# Patient Record
Sex: FEMALE | Race: WHITE | NOT HISPANIC OR LATINO | Employment: OTHER | URBAN - METROPOLITAN AREA
[De-identification: names, ages, dates, MRNs, and addresses within clinical notes are randomized per-mention and may not be internally consistent; named-entity substitution may affect disease eponyms.]

---

## 2017-01-03 ENCOUNTER — OFFICE VISIT (OUTPATIENT)
Dept: PSYCHIATRY | Facility: CLINIC | Age: 37
End: 2017-01-03
Payer: COMMERCIAL

## 2017-01-03 VITALS
DIASTOLIC BLOOD PRESSURE: 72 MMHG | SYSTOLIC BLOOD PRESSURE: 108 MMHG | HEIGHT: 62 IN | BODY MASS INDEX: 23 KG/M2 | HEART RATE: 75 BPM | WEIGHT: 125 LBS

## 2017-01-03 DIAGNOSIS — F90.0 ADHD (ATTENTION DEFICIT HYPERACTIVITY DISORDER), INATTENTIVE TYPE: Primary | ICD-10-CM

## 2017-01-03 DIAGNOSIS — F41.9 ANXIETY DISORDER, UNSPECIFIED TYPE: ICD-10-CM

## 2017-01-03 PROCEDURE — 99999 PR PBB SHADOW E&M-EST. PATIENT-LVL II: CPT | Mod: PBBFAC,,, | Performed by: PSYCHIATRY & NEUROLOGY

## 2017-01-03 PROCEDURE — 99214 OFFICE O/P EST MOD 30 MIN: CPT | Mod: S$GLB,,, | Performed by: PSYCHIATRY & NEUROLOGY

## 2017-01-03 PROCEDURE — 1159F MED LIST DOCD IN RCRD: CPT | Mod: S$GLB,,, | Performed by: PSYCHIATRY & NEUROLOGY

## 2017-01-03 RX ORDER — DEXTROAMPHETAMINE SACCHARATE, AMPHETAMINE ASPARTATE, DEXTROAMPHETAMINE SULFATE AND AMPHETAMINE SULFATE 5; 5; 5; 5 MG/1; MG/1; MG/1; MG/1
TABLET ORAL
Qty: 30 TABLET | Refills: 0 | Status: SHIPPED | OUTPATIENT
Start: 2017-01-03 | End: 2017-03-08 | Stop reason: SDUPTHER

## 2017-01-03 RX ORDER — BUPROPION HYDROCHLORIDE 150 MG/1
150 TABLET ORAL DAILY
Qty: 7 TABLET | Refills: 0 | Status: SHIPPED | OUTPATIENT
Start: 2017-01-03 | End: 2017-03-08

## 2017-01-03 RX ORDER — FLUOXETINE HYDROCHLORIDE 20 MG/1
60 CAPSULE ORAL DAILY
Qty: 90 CAPSULE | Refills: 5 | Status: SHIPPED | OUTPATIENT
Start: 2017-01-03 | End: 2017-04-03 | Stop reason: SDUPTHER

## 2017-01-03 RX ORDER — DEXTROAMPHETAMINE SACCHARATE, AMPHETAMINE ASPARTATE, DEXTROAMPHETAMINE SULFATE AND AMPHETAMINE SULFATE 5; 5; 5; 5 MG/1; MG/1; MG/1; MG/1
TABLET ORAL
Qty: 30 TABLET | Refills: 0 | Status: SHIPPED | OUTPATIENT
Start: 2017-02-03 | End: 2017-03-08 | Stop reason: SDUPTHER

## 2017-01-03 RX ORDER — BUPROPION HYDROCHLORIDE 300 MG/1
300 TABLET ORAL DAILY
Qty: 30 TABLET | Refills: 5 | Status: SHIPPED | OUTPATIENT
Start: 2017-01-03 | End: 2017-03-08

## 2017-01-03 RX ORDER — DEXTROAMPHETAMINE SACCHARATE, AMPHETAMINE ASPARTATE, DEXTROAMPHETAMINE SULFATE AND AMPHETAMINE SULFATE 5; 5; 5; 5 MG/1; MG/1; MG/1; MG/1
TABLET ORAL
Qty: 30 TABLET | Refills: 0 | Status: SHIPPED | OUTPATIENT
Start: 2017-03-03 | End: 2017-03-08 | Stop reason: SDUPTHER

## 2017-01-03 NOTE — PROGRESS NOTES
Outpatient Psychiatry Follow-Up Visit (MD/NP)    1/3/2017    Clinical Status of Patient:  Outpatient (Ambulatory)    Chief Complaint:  Stormy Carlisle is a 36 y.o.  female who presents today for follow-up of inattention and anxiety.    Transition of care assumed from Dr Fallon. Patient arrived 20 min late for her appt. Pt has been stable on Adderall 10mg PO BID and prozac 60mg daily for over a year.  Pt with 2 young kids at home, states they are doing well, deals with stressors of taking care of them well although she admits to feeling normative feeling of being overwhelmed by small daily stressors.  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 Patient with anxiety related to social engagement, feeling overwhelmed by tasks, being judgemental of herself. Lots of pressure from family to be financially successful/status oriented. Parents in [place] and they visit often.     Pt speech rapid but linear, appears to exhibit hyperactive sx in context of interview. Pt has never tried wellbutrin, has never had a seizure, was started on prozac by a GP while in college. Had tried paxil but did not have good effect. Pt has done CBT in past which she thought was very helpful. Pt states she is not able to make it to therapy appts now with her kids at home.     ADHD score: 13/32      Review of Systems   Psychiatric Review Of Systems - Is patient experiencing or having changes in:  sleep: no  appetite: no  weight: no  energy/anergy: no  interest/pleasure/anhedonia: no  somatic symptoms: no  libido: no  anxiety/panic: no  guilty/hopelessness: no  concentration: no  S.I.B.s/risky behavior: no  Irritability: no  Racing thoughts: no  Impulsive behaviors: no  Paranoia:no  AVH:no    Past Medical, Family and Social History: The patient's past medical, family and social history have been reviewed and updated as appropriate  "within the electronic medical record - see encounter notes.    Compliance: yes    Side effects: denies    Risk Parameters:  Patient reports no suicidal ideation  Patient reports no homicidal ideation  Patient reports no self-injurious behavior  Patient reports no violent behavior      Exam (detailed: at least 9 elements; comprehensive: all 15 elements)     Constitutional  Vitals:    There were no vitals filed for this visit.   General:  unremarkable, age appropriate     Musculoskeletal  Muscle Strength/Tone:  not examined   Gait & Station:  non-ataxic     Psychiatric  Speech:  no latency; no press   Mood & Affect:  "good"   congruent and appropriate   Thought Process:  normal and logical   Associations:  intact   Thought Content:  normal, no suicidality, no homicidality, delusions, or paranoia   Insight:  has awareness of illness   Judgement: behavior is adequate to circumstances   Orientation:  grossly intact   Memory: intact for content of interview   Language: grossly intact   Attention Span & Concentration:  able to focus   Fund of Knowledge:  intact and appropriate to age and level of education       Assessment and Diagnosis     Status/Progress: Based on the examination today, the patient's problem(s) is/are well controlled.  New problems have not been presented today.   Comorbidities are not complicating management of the primary condition.  There are no active rule-out diagnoses for this patient at this time.    General Impression:   ADHD; JAMEY      Intervention/Counseling/Treatment Plan   · Medication Management: The risks and benefits of medication were discussed with the patient  · Start wellbutrin 150mg daily with plan to titrate up to 300mg daily to assist with anxiety and ADHD sx.   · Continue prozac 60 mg PO daily; refills given x 3 months  · Continue adderall 20 mg daily - patient takes 10 mg bid; refills given x 3 months  · Patient is not breast feeding, nor does she plan to get pregnant again  Side " "effects of antidepressant include but are not limited to GI upset, serotonin syndrome, suicidal thoughts, sexual dysfunction, induction of jonny, and birth defects. These were discussed and understood by the patient. Warned of serotonin syndrome with some migraine meds  Side effects of stimulants include but are not limited to insomnia, headache, anxiety, psychosis, anorexia, hypertension, cardiovascular effects. These were discussed and understood by the patient. In addition, recommended patient use some sort of birth control if she is sexually active as the medication can adversely affect a fetus. Patient expressed understanding of this information.  Patient denies any side effects from medication at this time.  Patient advised not to take afternoon dose after 4pm as it can interfere with her sleep if taken that late in the day. Pt was advised to take drug holiday.  Recommended "the happiness trap" for ACT    Discussed diagnosis, risks and benefits of proposed treatment vs alternative treatments vs no treatment, and potential side effects of these treatments. The patient expresses understanding of the above and displays the capacity to agree with this treatment given said understanding. Patient also agrees that, currently, the benefits outweigh the risks and would like to pursue/continue treatment at this time.      Return to Clinic: 3 months    "

## 2017-01-09 NOTE — PROGRESS NOTES
This encounter was reviewed by me and case was discussed with the resident physician. I agree with the assessment and  treatment plan as stated.

## 2017-02-01 NOTE — TELEPHONE ENCOUNTER
----- Message from Radha Rubio sent at 2/1/2017 10:32 AM CST -----  Contact: pt  x_  1st Request  _  2nd Request  _  3rd Request    Please refill the medication(s) listed below.    Medication #1Trinessa      Medication #2      Preferred Pharmacy:[facility] [id number]

## 2017-02-01 NOTE — TELEPHONE ENCOUNTER
Pt has scheduled annual exam, her request for refill on her birth control will be sent in to  for approval.

## 2017-02-01 NOTE — TELEPHONE ENCOUNTER
----- Message from Moriah Snell sent at 2/1/2017  2:43 PM CST -----  Contact: self  Patient is wanting a refill on her birth control Rx for Micronor. Patient was advised to schedule an annual appt and done so. Patient uses [facility] [address] and [address]. Patient can be reached at [phone].

## 2017-02-02 RX ORDER — ACETAMINOPHEN AND CODEINE PHOSPHATE 120; 12 MG/5ML; MG/5ML
1 SOLUTION ORAL DAILY
Qty: 30 TABLET | Refills: 6 | Status: SHIPPED | OUTPATIENT
Start: 2017-02-02 | End: 2021-08-19

## 2017-02-09 ENCOUNTER — PATIENT MESSAGE (OUTPATIENT)
Dept: PSYCHIATRY | Facility: CLINIC | Age: 37
End: 2017-02-09

## 2017-02-18 ENCOUNTER — PATIENT MESSAGE (OUTPATIENT)
Dept: PSYCHIATRY | Facility: CLINIC | Age: 37
End: 2017-02-18

## 2017-02-20 ENCOUNTER — PATIENT MESSAGE (OUTPATIENT)
Dept: PSYCHIATRY | Facility: CLINIC | Age: 37
End: 2017-02-20

## 2017-03-08 ENCOUNTER — OFFICE VISIT (OUTPATIENT)
Dept: OBSTETRICS AND GYNECOLOGY | Facility: CLINIC | Age: 37
End: 2017-03-08
Attending: OBSTETRICS & GYNECOLOGY
Payer: COMMERCIAL

## 2017-03-08 VITALS
HEIGHT: 62 IN | WEIGHT: 122.81 LBS | SYSTOLIC BLOOD PRESSURE: 100 MMHG | BODY MASS INDEX: 22.6 KG/M2 | DIASTOLIC BLOOD PRESSURE: 64 MMHG

## 2017-03-08 DIAGNOSIS — Z01.419 WELL WOMAN EXAM WITH ROUTINE GYNECOLOGICAL EXAM: Primary | ICD-10-CM

## 2017-03-08 DIAGNOSIS — Z30.09 GENERAL COUNSELING AND ADVICE FOR CONTRACEPTIVE MANAGEMENT: ICD-10-CM

## 2017-03-08 PROCEDURE — 88175 CYTOPATH C/V AUTO FLUID REDO: CPT

## 2017-03-08 PROCEDURE — 99395 PREV VISIT EST AGE 18-39: CPT | Mod: S$GLB,,, | Performed by: OBSTETRICS & GYNECOLOGY

## 2017-03-08 PROCEDURE — 99999 PR PBB SHADOW E&M-EST. PATIENT-LVL III: CPT | Mod: PBBFAC,,, | Performed by: OBSTETRICS & GYNECOLOGY

## 2017-03-08 NOTE — MR AVS SNAPSHOT
[facility] [address]  [address]  [facility] [address]  Phone: [phone]  Fax: [phone]                  Stormy Carlisle   3/8/2017 9:45 AM   Office Visit    Description:  Female : 1980   Provider:  Araseli Toledo DO   Department:  [facility] [address]           Reason for Visit     Annual Exam                To Do List           Goals (5 Years of Data)     None      [facility]     [facility] On [facility] Nurse Care Line -  Assistance  Registered nurses in the [facility] provide clinical advisement, health education, appointment booking, and other advisory services.  Call for this free service at [phone].             Medications           Message regarding Medications     Verify the changes and/or additions to your medication regime listed below are the same as discussed with your clinician today.  If any of these changes or additions are incorrect, please notify your healthcare provider.        STOP taking these medications     buPROPion (WELLBUTRIN XL) 150 MG TB24 tablet Take 1 tablet (150 mg total) by mouth once daily.    buPROPion (WELLBUTRIN XL) 300 MG 24 hr tablet Take 1 tablet (300 mg total) by mouth once daily.    oxycodone-acetaminophen (PERCOCET) 5-325 mg per tablet Take 1 tablet by mouth every 4 (four) hours as needed.           Verify that the below list of medications is an accurate representation of the medications you are currently taking.  If none reported, the list may be blank. If incorrect, please contact your healthcare provider. Carry this list with you in case of emergency.           Current Medications     dextroamphetamine-amphetamine (ADDERALL) 20 mg tablet Take 1/2 tablet PO BID    fluoxetine (PROZAC) 20 MG capsule Take 3 capsules (60 mg total) by mouth once daily.    TRINESSA, 28, 0.18/0.215/0.25 mg-35 mcg (28) tablet TK 1 T PO QD    clobetasol 0.05% (TEMOVATE) 0.05 % Oint Apply topically 2 (two) times daily.    iron,  "cbn & gluc-FA-B12-C-DSS 90-1-12-50 mg-mg-mcg-mg Tab Take 1 tablet by mouth 2 (two) times daily.    naproxen (NAPROSYN) 500 MG tablet Take 1 tablet (500 mg total) by mouth every 8 (eight) hours.    norethindrone (MICRONOR) 0.35 mg tablet Take 1 tablet (0.35 mg total) by mouth once daily.           Clinical Reference Information           Your Vitals Were     BP Height Weight Last Period BMI    100/64 5' 2" (1.575 m) 55.7 kg (122 lb 12.7 oz) 02/08/2017 (Approximate) 22.46 kg/m2      Blood Pressure          Most Recent Value    BP  100/64      Allergies as of 3/8/2017     Neoporacin [Neomycin-bacitracnzn-polymyxnb]      Immunizations Administered on Date of Encounter - 3/8/2017     None      Language Assistance Services     ATTENTION: Language assistance services are available, free of charge. Please call [phone].      ATENCIÓN: Si habla sanam, tiene a ramsey disposición servicios gratuitos de asistencia lingüística. Llame al [phone].     SUN Ý: N?u b?n nói Ti?ng Vi?t, có các d?ch v? h? tr? ngôn ng? mi?n phí dành cho b?n. G?i s? [phone].         [facility] [address] complies with applicable Federal civil rights laws and does not discriminate on the basis of race, color, national origin, age, disability, or sex.        "

## 2017-03-08 NOTE — PROGRESS NOTES
"CC: Well woman exam    Stormy Carlisle is a 37 y.o. female  presents for well woman exam.  LMP: Patient's last menstrual period was 2017 (approximate)..  No issues, problems, or complaints.  Due for pap, last recoreded her was normal in .  Interested in discussion of contraceptive options, mostly interested in OCPS.    Past Medical History:   Diagnosis Date    Abnormal Pap smear     Colposcopy    ADHD (attention deficit hyperactivity disorder)     Anxiety     Depression      of psychiatric care     Psychiatric problem     Therapy      Past Surgical History:   Procedure Laterality Date    Breast augmentation      INDUCED        Social History     Social History    Marital status:      Spouse name: N/A    Number of children: 2    Years of education: N/A     Occupational History    Not on file.     Social History Main Topics    Smoking status: Never Smoker    Smokeless tobacco: Not on file    Alcohol use No    Drug use: No    Sexual activity: Yes     Partners: Male     Birth control/ protection: OCP     Other Topics Concern    Not on file     Social History Narrative    Born and raised in [place].    Moved to [place] in .    Graduate school at [employer].     for 6 years.    2 children; recently post partum 1/15/15     is an     Works as an artist.     Family History   Problem Relation Age of Onset    Colon cancer Maternal Grandmother     Breast cancer Paternal Aunt     Ovarian cancer Neg Hx      OB History      Para Term  AB Living    4 2 2  2 2    SAB TAB Ectopic Multiple Live Births    0 1  0 2          /64   Ht 5' 2" (1.575 m)   Wt 55.7 kg (122 lb 12.7 oz)   LMP 2017 (Approximate)   BMI 22.46 kg/m²       ROS:  GENERAL: Denies weight gain or weight loss. Feeling well overall.   SKIN: Denies rash or lesions.   HEAD: Denies head injury or headache.   NODES: Denies enlarged lymph nodes.   CHEST: Denies chest pain " or shortness of breath.   CARDIOVASCULAR: Denies palpitations or left sided chest pain.   ABDOMEN: No abdominal pain, constipation, diarrhea, nausea, vomiting or rectal bleeding.   URINARY: No frequency, dysuria, hematuria, or burning on urination.  REPRODUCTIVE: See HPI.   BREASTS: The patient performs breast self-examination and denies pain, lumps, or nipple discharge.   HEMATOLOGIC: No easy bruisability or excessive bleeding.   MUSCULOSKELETAL: Denies joint pain or swelling.   NEUROLOGIC: Denies syncope or weakness.   PSYCHIATRIC: Denies depression, anxiety or mood swings.    PHYSICAL EXAM:  APPEARANCE: Well nourished, well developed, in no acute distress.  AFFECT: WNL, alert and oriented x 3  SKIN: No acne or hirsutism  NECK: Neck symmetric without masses or thyromegaly  NODES: No inguinal, cervical, axillary, or femoral lymph node enlargement  CHEST: Good respiratory effect  ABDOMEN: Soft.  No tenderness or masses.  No hepatosplenomegaly.  No hernias.  BREASTS: Symmetrical, no skin changes or visible lesions.  No palpable masses, nipple discharge bilaterally.  PELVIC: Normal external genitalia without lesions.  Normal hair distribution.  Adequate perineal body, normal urethral meatus.  Vagina moist and well rugated without lesions or discharge.  Cervix pink, without lesions, discharge or tenderness.  No significant cystocele or rectocele.  Bimanual exam shows uterus to be normal size, regular, mobile and nontender.  Adnexa without masses or tenderness.    EXTREMITIES: No edema.  The use of hormonal contraception has been fully discussed with the patient. We discussed all options including OCPs, transdermal patches, vaginal ring, Depo Provera injections, Implanon, and IUD. Warnings about anticipated minor side effects such as breakthrough spotting, nausea, breast tenderness, weight changes, acne, headaches, etc were given.  She has been told of the more serious potential side effects such as MI, stroke, and deep  vein thrombosis, all of which are very unlikely.  She has been asked to report any signs of such serious problems immediately. The need for additional protection, such as a condom, to prevent exposure to sexually transmitted diseases has also been discussed- the patient has been clearly reminded that no hormonal contraceptive method can protect her against diseases such as HIV and others. She understands and wishes to take the medication as prescribed. She wishes to begin nexplanon.      Well woman exam with routine gynecological exam  -     Liquid-based pap smear, screening    General counseling and advice for contraceptive management  -     TRINESSA, 28, 0.18/0.215/0.25 mg-35 mcg (28) tablet; Take 1 tablet by mouth once daily.  Dispense: 28 tablet; Refill: 11            Patient was counseled today on A.C.S. Pap guidelines and recommendations for yearly pelvic exams, mammograms and monthly self breast exams; to see her PCP for other health maintenance.     No Follow-up on file.

## 2017-03-10 ENCOUNTER — TELEPHONE (OUTPATIENT)
Dept: OBSTETRICS AND GYNECOLOGY | Facility: CLINIC | Age: 37
End: 2017-03-10

## 2017-03-10 NOTE — TELEPHONE ENCOUNTER
Left Vm informing pt that  Per Nexplanon insurance verification stated that her insurance policy had been terminated.

## 2017-04-03 ENCOUNTER — OFFICE VISIT (OUTPATIENT)
Dept: PSYCHIATRY | Facility: CLINIC | Age: 37
End: 2017-04-03
Payer: COMMERCIAL

## 2017-04-03 VITALS
HEIGHT: 62 IN | HEART RATE: 73 BPM | SYSTOLIC BLOOD PRESSURE: 109 MMHG | WEIGHT: 124 LBS | DIASTOLIC BLOOD PRESSURE: 68 MMHG | BODY MASS INDEX: 22.82 KG/M2

## 2017-04-03 DIAGNOSIS — F90.8 ADHD, ADULT RESIDUAL TYPE: Primary | ICD-10-CM

## 2017-04-03 PROCEDURE — 99214 OFFICE O/P EST MOD 30 MIN: CPT | Mod: S$GLB,,, | Performed by: PSYCHIATRY & NEUROLOGY

## 2017-04-03 PROCEDURE — 99999 PR PBB SHADOW E&M-EST. PATIENT-LVL II: CPT | Mod: PBBFAC,,, | Performed by: PSYCHIATRY & NEUROLOGY

## 2017-04-03 RX ORDER — FLUOXETINE HYDROCHLORIDE 20 MG/1
60 CAPSULE ORAL DAILY
Qty: 90 CAPSULE | Refills: 5 | Status: SHIPPED | OUTPATIENT
Start: 2017-04-03 | End: 2017-07-07 | Stop reason: SDUPTHER

## 2017-04-03 RX ORDER — DEXTROAMPHETAMINE SACCHARATE, AMPHETAMINE ASPARTATE, DEXTROAMPHETAMINE SULFATE AND AMPHETAMINE SULFATE 5; 5; 5; 5 MG/1; MG/1; MG/1; MG/1
TABLET ORAL
Qty: 30 TABLET | Refills: 0 | Status: SHIPPED | OUTPATIENT
Start: 2017-04-03 | End: 2017-07-07 | Stop reason: SDUPTHER

## 2017-04-03 RX ORDER — DEXTROAMPHETAMINE SACCHARATE, AMPHETAMINE ASPARTATE, DEXTROAMPHETAMINE SULFATE AND AMPHETAMINE SULFATE 5; 5; 5; 5 MG/1; MG/1; MG/1; MG/1
1 TABLET ORAL DAILY
Qty: 30 TABLET | Refills: 0 | Status: SHIPPED | OUTPATIENT
Start: 2017-05-03 | End: 2017-07-07 | Stop reason: SDUPTHER

## 2017-04-03 RX ORDER — DEXTROAMPHETAMINE SACCHARATE, AMPHETAMINE ASPARTATE, DEXTROAMPHETAMINE SULFATE AND AMPHETAMINE SULFATE 5; 5; 5; 5 MG/1; MG/1; MG/1; MG/1
1 TABLET ORAL DAILY
Qty: 30 TABLET | Refills: 0 | Status: SHIPPED | OUTPATIENT
Start: 2017-06-03 | End: 2017-07-07 | Stop reason: SDUPTHER

## 2017-04-03 NOTE — PROGRESS NOTES
Outpatient Psychiatry Follow-Up Visit (MD/NP)    4/3/2017    Clinical Status of Patient:  Outpatient (Ambulatory)    Chief Complaint:  Stormy Carlisle is a 37 y.o.  female who presents today for follow-up of inattention and anxiety. Patient arrived 10 min late for her appt. Pt has been stable on Adderall 10mg PO BID and prozac 60mg daily for over a year.  Tried wellbutrin but had side effects, has stopped taking it since last visit. Pt with 2 young kids at home, states they are doing well, deals with stressors of taking care of them well although she admits to feeling normative feeling of being overwhelmed by small daily stressors.  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 Patient with anxiety related to social engagement, feeling overwhelmed by tasks, being judgemental of herself. Pt having some relationship stressors with  but relationship stable. Lots of pressure from family to be financially successful/status oriented. Parents in [place] and they visit often.     Pt speech rapid but linear, appears to exhibit hyperactive sx in context of interview. Pt was started on prozac by a GP while in college. Had tried paxil but did not have good effect. Pt has done CBT in past which she thought was very helpful. Pt states she is not able to make it to therapy appts now with her kids at home.     ADHD score: 13/32      Review of Systems   Psychiatric Review Of Systems - Is patient experiencing or having changes in:  sleep: no  appetite: no  weight: no  energy/anergy: no  interest/pleasure/anhedonia: no  somatic symptoms: no  libido: no  anxiety/panic: no  guilty/hopelessness: no  concentration: no  S.I.B.s/risky behavior: no  Irritability: no  Racing thoughts: no  Impulsive behaviors: no  Paranoia:no  AVH:no    Past Medical, Family and Social History: The patient's past medical, family and social  "history have been reviewed and updated as appropriate within the electronic medical record - see encounter notes.    Compliance: yes    Side effects: denies    Risk Parameters:  Patient reports no suicidal ideation  Patient reports no homicidal ideation  Patient reports no self-injurious behavior  Patient reports no violent behavior      Exam (detailed: at least 9 elements; comprehensive: all 15 elements)     Constitutional  Vitals:    Vitals:    04/03/17 0908   BP: 109/68   Pulse: 73      General:  unremarkable, age appropriate     Musculoskeletal  Muscle Strength/Tone:  not examined   Gait & Station:  non-ataxic     Psychiatric  Speech:  no latency; no press   Mood & Affect:  "good"   congruent and appropriate   Thought Process:  normal and logical   Associations:  intact   Thought Content:  normal, no suicidality, no homicidality, delusions, or paranoia   Insight:  has awareness of illness   Judgement: behavior is adequate to circumstances   Orientation:  grossly intact   Memory: intact for content of interview   Language: grossly intact   Attention Span & Concentration:  able to focus   Fund of Knowledge:  intact and appropriate to age and level of education       Assessment and Diagnosis     Status/Progress: Based on the examination today, the patient's problem(s) is/are well controlled.  New problems have not been presented today.   Comorbidities are not complicating management of the primary condition.  There are no active rule-out diagnoses for this patient at this time.    General Impression:   ADHD; JAMEY      Intervention/Counseling/Treatment Plan   · Medication Management: The risks and benefits of medication were discussed with the patient  · Stop wellbutrin 2/2 anxiety.   · Continue prozac 60 mg PO daily; refills given x 3 months  · Continue adderall 20 mg daily - patient takes 10 mg bid; refills given x 3 months  · Patient is not breast feeding, nor does she plan to get pregnant again  Side effects of " antidepressant include but are not limited to GI upset, serotonin syndrome, suicidal thoughts, sexual dysfunction, induction of jonny, and birth defects. These were discussed and understood by the patient. Warned of serotonin syndrome with some migraine meds  Side effects of stimulants include but are not limited to insomnia, headache, anxiety, psychosis, anorexia, hypertension, cardiovascular effects. These were discussed and understood by the patient. In addition, recommended patient use some sort of birth control if she is sexually active as the medication can adversely affect a fetus. Patient expressed understanding of this information.  Patient denies any side effects from medication at this time.  Patient advised not to take afternoon dose after 4pm as it can interfere with her sleep if taken that late in the day. Pt was advised to take drug holiday.      Discussed diagnosis, risks and benefits of proposed treatment vs alternative treatments vs no treatment, and potential side effects of these treatments. The patient expresses understanding of the above and displays the capacity to agree with this treatment given said understanding. Patient also agrees that, currently, the benefits outweigh the risks and would like to pursue/continue treatment at this time.      Return to Clinic: 3 months

## 2017-07-07 ENCOUNTER — OFFICE VISIT (OUTPATIENT)
Dept: PSYCHIATRY | Facility: CLINIC | Age: 37
End: 2017-07-07
Payer: COMMERCIAL

## 2017-07-07 VITALS
HEIGHT: 62 IN | HEART RATE: 77 BPM | SYSTOLIC BLOOD PRESSURE: 107 MMHG | BODY MASS INDEX: 23 KG/M2 | DIASTOLIC BLOOD PRESSURE: 62 MMHG | WEIGHT: 125 LBS

## 2017-07-07 DIAGNOSIS — F90.0 ADHD, PREDOMINANTLY INATTENTIVE TYPE: ICD-10-CM

## 2017-07-07 DIAGNOSIS — F32.A DEPRESSION, UNSPECIFIED DEPRESSION TYPE: Primary | ICD-10-CM

## 2017-07-07 PROCEDURE — 99999 PR PBB SHADOW E&M-EST. PATIENT-LVL III: CPT | Mod: PBBFAC,,, | Performed by: PSYCHIATRY & NEUROLOGY

## 2017-07-07 PROCEDURE — 99213 OFFICE O/P EST LOW 20 MIN: CPT | Mod: S$GLB,,, | Performed by: PSYCHIATRY & NEUROLOGY

## 2017-07-07 RX ORDER — DEXTROAMPHETAMINE SACCHARATE, AMPHETAMINE ASPARTATE, DEXTROAMPHETAMINE SULFATE AND AMPHETAMINE SULFATE 5; 5; 5; 5 MG/1; MG/1; MG/1; MG/1
TABLET ORAL
Qty: 30 TABLET | Refills: 0 | Status: SHIPPED | OUTPATIENT
Start: 2017-08-06 | End: 2017-07-07 | Stop reason: SDUPTHER

## 2017-07-07 RX ORDER — DEXTROAMPHETAMINE SACCHARATE, AMPHETAMINE ASPARTATE, DEXTROAMPHETAMINE SULFATE AND AMPHETAMINE SULFATE 5; 5; 5; 5 MG/1; MG/1; MG/1; MG/1
TABLET ORAL
Qty: 30 TABLET | Refills: 0 | Status: SHIPPED | OUTPATIENT
Start: 2017-07-07 | End: 2017-09-15

## 2017-07-07 RX ORDER — DEXTROAMPHETAMINE SACCHARATE, AMPHETAMINE ASPARTATE, DEXTROAMPHETAMINE SULFATE AND AMPHETAMINE SULFATE 5; 5; 5; 5 MG/1; MG/1; MG/1; MG/1
TABLET ORAL
Qty: 30 TABLET | Refills: 0 | Status: SHIPPED | OUTPATIENT
Start: 2017-08-06 | End: 2017-09-15 | Stop reason: SDUPTHER

## 2017-07-07 RX ORDER — FLUOXETINE HYDROCHLORIDE 20 MG/1
60 CAPSULE ORAL DAILY
Qty: 90 CAPSULE | Refills: 5 | Status: SHIPPED | OUTPATIENT
Start: 2017-07-07 | End: 2017-09-15 | Stop reason: SDUPTHER

## 2017-07-07 RX ORDER — DEXTROAMPHETAMINE SACCHARATE, AMPHETAMINE ASPARTATE, DEXTROAMPHETAMINE SULFATE AND AMPHETAMINE SULFATE 5; 5; 5; 5 MG/1; MG/1; MG/1; MG/1
TABLET ORAL
Qty: 30 TABLET | Refills: 0 | Status: SHIPPED | OUTPATIENT
Start: 2017-09-05 | End: 2017-09-15

## 2017-07-07 RX ORDER — DEXTROAMPHETAMINE SACCHARATE, AMPHETAMINE ASPARTATE, DEXTROAMPHETAMINE SULFATE AND AMPHETAMINE SULFATE 5; 5; 5; 5 MG/1; MG/1; MG/1; MG/1
TABLET ORAL
Qty: 30 TABLET | Refills: 0 | Status: SHIPPED | OUTPATIENT
Start: 2017-07-07 | End: 2017-07-07 | Stop reason: SDUPTHER

## 2017-07-07 RX ORDER — DEXTROAMPHETAMINE SACCHARATE, AMPHETAMINE ASPARTATE, DEXTROAMPHETAMINE SULFATE AND AMPHETAMINE SULFATE 5; 5; 5; 5 MG/1; MG/1; MG/1; MG/1
TABLET ORAL
Qty: 30 TABLET | Refills: 0 | Status: SHIPPED | OUTPATIENT
Start: 2017-09-05 | End: 2017-07-07 | Stop reason: SDUPTHER

## 2017-07-07 NOTE — PATIENT INSTRUCTIONS
Take medications as directed. Do not stop medications before our next appointment unless instructed to do so. If there are any side effects or other problems causing you to be unable to take the medications please call Dr. Jurado at [phone] and leave him a message.
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
## 2017-09-15 ENCOUNTER — OFFICE VISIT (OUTPATIENT)
Dept: PSYCHIATRY | Facility: CLINIC | Age: 37
End: 2017-09-15
Payer: COMMERCIAL

## 2017-09-15 VITALS
SYSTOLIC BLOOD PRESSURE: 111 MMHG | HEART RATE: 76 BPM | BODY MASS INDEX: 22.63 KG/M2 | DIASTOLIC BLOOD PRESSURE: 76 MMHG | WEIGHT: 123 LBS | HEIGHT: 62 IN

## 2017-09-15 DIAGNOSIS — F32.A DEPRESSION, UNSPECIFIED DEPRESSION TYPE: ICD-10-CM

## 2017-09-15 DIAGNOSIS — F90.0 ADHD, PREDOMINANTLY INATTENTIVE TYPE: Primary | ICD-10-CM

## 2017-09-15 DIAGNOSIS — F41.9 ANXIETY DISORDER, UNSPECIFIED TYPE: ICD-10-CM

## 2017-09-15 PROCEDURE — 99213 OFFICE O/P EST LOW 20 MIN: CPT | Mod: S$GLB,,, | Performed by: PSYCHIATRY & NEUROLOGY

## 2017-09-15 PROCEDURE — 3008F BODY MASS INDEX DOCD: CPT | Mod: S$GLB,,, | Performed by: PSYCHIATRY & NEUROLOGY

## 2017-09-15 PROCEDURE — 99999 PR PBB SHADOW E&M-EST. PATIENT-LVL III: CPT | Mod: PBBFAC,,, | Performed by: PSYCHIATRY & NEUROLOGY

## 2017-09-15 RX ORDER — DEXTROAMPHETAMINE SACCHARATE, AMPHETAMINE ASPARTATE, DEXTROAMPHETAMINE SULFATE AND AMPHETAMINE SULFATE 5; 5; 5; 5 MG/1; MG/1; MG/1; MG/1
TABLET ORAL
Qty: 30 TABLET | Refills: 0 | Status: SHIPPED | OUTPATIENT
Start: 2017-10-15 | End: 2017-09-15 | Stop reason: SDUPTHER

## 2017-09-15 RX ORDER — DEXTROAMPHETAMINE SACCHARATE, AMPHETAMINE ASPARTATE, DEXTROAMPHETAMINE SULFATE AND AMPHETAMINE SULFATE 5; 5; 5; 5 MG/1; MG/1; MG/1; MG/1
TABLET ORAL
Qty: 30 TABLET | Refills: 0 | Status: SHIPPED | OUTPATIENT
Start: 2017-09-15 | End: 2017-09-15 | Stop reason: SDUPTHER

## 2017-09-15 RX ORDER — DEXTROAMPHETAMINE SACCHARATE, AMPHETAMINE ASPARTATE, DEXTROAMPHETAMINE SULFATE AND AMPHETAMINE SULFATE 5; 5; 5; 5 MG/1; MG/1; MG/1; MG/1
TABLET ORAL
Qty: 30 TABLET | Refills: 0 | Status: SHIPPED | OUTPATIENT
Start: 2017-11-14 | End: 2017-11-28 | Stop reason: SDUPTHER

## 2017-09-15 RX ORDER — FLUOXETINE HYDROCHLORIDE 20 MG/1
60 CAPSULE ORAL DAILY
Qty: 90 CAPSULE | Refills: 5 | Status: SHIPPED | OUTPATIENT
Start: 2017-09-15 | End: 2018-01-05 | Stop reason: SDUPTHER

## 2017-09-15 NOTE — PROGRESS NOTES
Outpatient Psychiatry Follow-Up Visit (MD/NP)    9/15/2017    Clinical Status of Patient:  Outpatient (Ambulatory)    Chief Complaint:  Stormy Carlisle is a 37 y.o. female who presents today for follow-up of depression, anxiety and attention problems.  Met with patient.      Interval History and Content of Current Session:  Interim Events/Subjective Report/Content of Current Session: 36 y/o F with hx depression, anxiety and ADHD who presents for follow up. Patient is 20 minutes late for 30 minute appointment. Patient seen briefly. Pt reports that things are going well. Relationship with  has improved since last visit. Children are back in school which has decreased the patient's anxiety. No ssx of depression at this time. Continued anxiety though improved. Good appetite, sleeping well.     Review of Systems   · PSYCHIATRIC: Pertinant items are noted in the narrative.  · CONSTITUTIONAL: No weight gain or loss.   · MUSCULOSKELETAL: No pain or stiffness of the joints.  · NEUROLOGIC: No weakness, sensory changes, seizures, confusion, memory loss, tremor or other abnormal movements.  · ENDOCRINE: No polydipsia or polyuria.  · INTEGUMENTARY: No rashes or lacerations.  · EYES: No exophthalmos, jaundice or blindness.  · ENT: No dizziness, tinnitus or hearing loss.  · RESPIRATORY: No shortness of breath.  · CARDIOVASCULAR: No tachycardia or chest pain.  · GASTROINTESTINAL: No nausea, vomiting, pain, constipation or diarrhea.  · GENITOURINARY: No frequency, dysuria or sexual dysfunction.    Past Medical, Family and Social History: The patient's past medical, family and social history have been reviewed and updated as appropriate within the electronic medical record - see encounter notes.    Compliance: yes    Side effects: None    Risk Parameters:  Patient reports no suicidal ideation  Patient reports no homicidal ideation  Patient reports no self-injurious behavior  Patient reports no violent behavior    Exam (detailed:  "at least 9 elements; comprehensive: all 15 elements)   Constitutional  Vitals:  Most recent vital signs, dated less than 90 days prior to this appointment, were reviewed.   Vitals:    09/15/17 0917   BP: 111/76   Pulse: 76   Weight: 55.8 kg (123 lb)   Height: 5' 2" (1.575 m)        General:  unremarkable, age appropriate, normal weight, well nourished, casually dressed     Musculoskeletal  Muscle Strength/Tone:  no dystonia, no tremor   Gait & Station:  non-ataxic     Psychiatric  Speech:  no latency; no press, spontaneous   Mood & Affect:  "good"  congruent and appropriate, full   Thought Process:  normal and logical, logical   Associations:  intact   Thought Content:  normal, no suicidality, no homicidality, delusions, or paranoia   Insight:  intact, has awareness of illness   Judgement: behavior is adequate to circumstances, age appropriate   Orientation:  grossly intact   Memory: intact for content of interview, able to remember recent events- yes, able to remember remote events- yes   Language: grossly intact   Attention Span & Concentration:  able to focus   Fund of Knowledge:  intact and appropriate to age and level of education     Assessment and Diagnosis   Status/Progress: Based on the examination today, the patient's problem(s) is/are improved.  New problems have not been presented today.   Co-morbidities, Diagnostic uncertainty and Lack of compliance are not complicating management of the primary condition.  There are no active rule-out diagnoses for this patient at this time.     General Impression: 38 y/o F with hx of depression, anxiety and ADHD who presents for follow up. Ssx of depression and anxiety are improved since last appointment. Continue current regimen.       ICD-10-CM ICD-9-CM   1. ADHD, predominantly inattentive type F90.0 314.00   2. Anxiety disorder, unspecified type F41.9 300.00   3. Depression, unspecified depression type F32.9 311     Intervention/Counseling/Treatment Plan "     Depression, anxiety  · Continue Prozac 60 mg PO daily    ADHD, inattentive type  · Continue Adderall 10 mg PO BID    Discussed diagnosis, risks and benefits of proposed treatment vs alternative treatments vs no treatment, and potential side effects of these treatments. The patient expresses understanding of the above and displays the capacity to agree with this treatment given said understanding. Patient also agrees that, currently, the benefits outweigh the risks and would like to pursue/continue treatment at this time.    Return to Clinic: 3 months       Ravinder Jurado MD  [facility]  [phone]

## 2017-11-28 DIAGNOSIS — Z30.09 GENERAL COUNSELING AND ADVICE FOR CONTRACEPTIVE MANAGEMENT: ICD-10-CM

## 2017-11-28 RX ORDER — DEXTROAMPHETAMINE SACCHARATE, AMPHETAMINE ASPARTATE, DEXTROAMPHETAMINE SULFATE AND AMPHETAMINE SULFATE 5; 5; 5; 5 MG/1; MG/1; MG/1; MG/1
TABLET ORAL
Qty: 30 TABLET | Refills: 0 | Status: SHIPPED | OUTPATIENT
Start: 2017-11-28 | End: 2017-12-01 | Stop reason: SDUPTHER

## 2017-11-29 NOTE — TELEPHONE ENCOUNTER
----- Message from Nicho Gaona sent at 11/29/2017  8:56 AM CST -----  Contact: pt  x_ 1st Request  _ 2nd Request  _ 3rd Request    Who: pt    Why: requesting a refill on her birth control RX    What Number to Call Back: [phone]    When to Expect a call back: (Before the end of the day)  -- if call after 3:00 call back will be tomorrow.

## 2017-12-01 DIAGNOSIS — F90.0 ADHD, PREDOMINANTLY INATTENTIVE TYPE: Primary | ICD-10-CM

## 2017-12-01 RX ORDER — DEXTROAMPHETAMINE SACCHARATE, AMPHETAMINE ASPARTATE, DEXTROAMPHETAMINE SULFATE AND AMPHETAMINE SULFATE 5; 5; 5; 5 MG/1; MG/1; MG/1; MG/1
TABLET ORAL
Qty: 30 TABLET | Refills: 0 | Status: SHIPPED | OUTPATIENT
Start: 2017-12-01 | End: 2018-01-05 | Stop reason: SDUPTHER

## 2017-12-13 DIAGNOSIS — Z30.09 GENERAL COUNSELING AND ADVICE FOR CONTRACEPTIVE MANAGEMENT: ICD-10-CM

## 2018-01-05 ENCOUNTER — OFFICE VISIT (OUTPATIENT)
Dept: PSYCHIATRY | Facility: CLINIC | Age: 38
End: 2018-01-05
Payer: COMMERCIAL

## 2018-01-05 VITALS
SYSTOLIC BLOOD PRESSURE: 111 MMHG | HEART RATE: 85 BPM | WEIGHT: 130.38 LBS | HEIGHT: 62 IN | DIASTOLIC BLOOD PRESSURE: 67 MMHG | BODY MASS INDEX: 23.99 KG/M2

## 2018-01-05 DIAGNOSIS — F32.A DEPRESSION, UNSPECIFIED DEPRESSION TYPE: ICD-10-CM

## 2018-01-05 DIAGNOSIS — F41.9 ANXIETY: ICD-10-CM

## 2018-01-05 DIAGNOSIS — F90.0 ATTENTION DEFICIT HYPERACTIVITY DISORDER (ADHD), PREDOMINANTLY INATTENTIVE TYPE: Primary | ICD-10-CM

## 2018-01-05 PROCEDURE — 99999 PR PBB SHADOW E&M-EST. PATIENT-LVL II: CPT | Mod: PBBFAC,,, | Performed by: PSYCHIATRY & NEUROLOGY

## 2018-01-05 PROCEDURE — 99214 OFFICE O/P EST MOD 30 MIN: CPT | Mod: S$GLB,,, | Performed by: PSYCHIATRY & NEUROLOGY

## 2018-01-05 RX ORDER — DEXTROAMPHETAMINE SACCHARATE, AMPHETAMINE ASPARTATE, DEXTROAMPHETAMINE SULFATE AND AMPHETAMINE SULFATE 5; 5; 5; 5 MG/1; MG/1; MG/1; MG/1
TABLET ORAL
Qty: 30 TABLET | Refills: 0 | Status: SHIPPED | OUTPATIENT
Start: 2018-01-05 | End: 2018-01-05 | Stop reason: SDUPTHER

## 2018-01-05 RX ORDER — FLUOXETINE HYDROCHLORIDE 20 MG/1
60 CAPSULE ORAL DAILY
Qty: 90 CAPSULE | Refills: 5 | Status: SHIPPED | OUTPATIENT
Start: 2018-01-05 | End: 2018-04-20 | Stop reason: SDUPTHER

## 2018-01-05 RX ORDER — DEXTROAMPHETAMINE SACCHARATE, AMPHETAMINE ASPARTATE, DEXTROAMPHETAMINE SULFATE AND AMPHETAMINE SULFATE 5; 5; 5; 5 MG/1; MG/1; MG/1; MG/1
TABLET ORAL
Qty: 30 TABLET | Refills: 0 | Status: SHIPPED | OUTPATIENT
Start: 2018-03-06 | End: 2018-04-20 | Stop reason: SDUPTHER

## 2018-01-05 RX ORDER — DEXTROAMPHETAMINE SACCHARATE, AMPHETAMINE ASPARTATE, DEXTROAMPHETAMINE SULFATE AND AMPHETAMINE SULFATE 5; 5; 5; 5 MG/1; MG/1; MG/1; MG/1
TABLET ORAL
Qty: 30 TABLET | Refills: 0 | Status: SHIPPED | OUTPATIENT
Start: 2018-02-04 | End: 2018-01-05 | Stop reason: SDUPTHER

## 2018-01-05 NOTE — PROGRESS NOTES
"Outpatient Psychiatry Follow-Up Visit (MD/NP)    1/5/2018    Clinical Status of Patient:  Outpatient (Ambulatory)    Chief Complaint:  Stormy Carlisle is a 37 y.o. female who presents today for follow-up of depression, anxiety and attention problems.  Met with patient.      Interval History and Content of Current Session:  Interim Events/Subjective Report/Content of Current Session: 38 y/o F with hx depression, anxiety and ADHD who presents for follow up. She reports that the holiday season has been "crazy" due to work and children being off of school. She recounts story of having to bring daughter to work and daughter interrupting her while talking to a client. In addition, her parents are coming to visit which is very stressful for her. Otherwise, patient is feeling "okay." No ssx of depression at this time. Continued anxiety though improved. Good appetite, sleeping well.     Psychiatric Medications:  · Prozac 60 mg PO daily  · Adderall 10 mg PO BID    Review of Systems   · PSYCHIATRIC: Pertinant items are noted in the narrative.  · CONSTITUTIONAL: No weight gain or loss.   · MUSCULOSKELETAL: No pain or stiffness of the joints.  · NEUROLOGIC: No weakness, sensory changes, seizures, confusion, memory loss, tremor or other abnormal movements.  · ENDOCRINE: No polydipsia or polyuria.  · INTEGUMENTARY: No rashes or lacerations.  · EYES: No exophthalmos, jaundice or blindness.  · ENT: No dizziness, tinnitus or hearing loss.  · RESPIRATORY: No shortness of breath.  · CARDIOVASCULAR: No tachycardia or chest pain.  · GASTROINTESTINAL: No nausea, vomiting, pain, constipation or diarrhea.  · GENITOURINARY: No frequency, dysuria or sexual dysfunction.    Past Medical, Family and Social History: The patient's past medical, family and social history have been reviewed and updated as appropriate within the electronic medical record - see encounter notes.    Compliance: yes    Side effects: None    Risk Parameters:  Patient " "reports no suicidal ideation  Patient reports no homicidal ideation  Patient reports no self-injurious behavior  Patient reports no violent behavior    Exam (detailed: at least 9 elements; comprehensive: all 15 elements)   Constitutional  Vitals:  Most recent vital signs, dated less than 90 days prior to this appointment, were reviewed.   Vitals:    01/05/18 1515   BP: 111/67   Pulse: 85   Weight: 59.1 kg (130 lb 6.4 oz)   Height: 5' 2" (1.575 m)      General:  unremarkable, age appropriate, normal weight, well nourished, casually dressed     Musculoskeletal  Muscle Strength/Tone:  no dystonia, no tremor   Gait & Station:  non-ataxic     Psychiatric  Speech:  no latency; no press, spontaneous   Mood & Affect:  "okay"  congruent and appropriate, full   Thought Process:  normal and logical, logical   Associations:  intact   Thought Content:  normal, no suicidality, no homicidality, delusions, or paranoia   Insight:  intact, has awareness of illness   Judgement: behavior is adequate to circumstances, age appropriate   Orientation:  grossly intact   Memory: intact for content of interview, able to remember recent events- yes, able to remember remote events- yes   Language: grossly intact   Attention Span & Concentration:  able to focus   Fund of Knowledge:  intact and appropriate to age and level of education     Assessment and Diagnosis   Status/Progress: Based on the examination today, the patient's problem(s) is/are improved.  New problems have not been presented today.   Co-morbidities, Diagnostic uncertainty and Lack of compliance are not complicating management of the primary condition.  There are no active rule-out diagnoses for this patient at this time.     General Impression: 36 y/o F with hx of depression, anxiety and ADHD who presents for follow up. Pt with continued anxiety though it is tolerable for her. No ssx of depression. Continue current regimen.       ICD-10-CM ICD-9-CM   1. Attention deficit " hyperactivity disorder (ADHD), predominantly inattentive type F90.0 314.00   2. Anxiety F41.9 300.00   3. Depression, unspecified depression type F32.9 311     Intervention/Counseling/Treatment Plan     Depression & Anxiety  · Continue Prozac 60 mg PO daily    ADHD, inattentive type  · Continue Adderall 10 mg PO BID    Discussed diagnosis, risks and benefits of proposed treatment vs alternative treatments vs no treatment, and potential side effects of these treatments. The patient expresses understanding of the above and displays the capacity to agree with this treatment given said understanding. Patient also agrees that, currently, the benefits outweigh the risks and would like to pursue/continue treatment at this time. No plans to get pregnant at this time.     Return to Clinic: 3 months       Ravinder Jurado MD  [facility]

## 2018-02-01 DIAGNOSIS — Z30.09 GENERAL COUNSELING AND ADVICE FOR CONTRACEPTIVE MANAGEMENT: ICD-10-CM

## 2018-03-26 DIAGNOSIS — Z30.09 GENERAL COUNSELING AND ADVICE FOR CONTRACEPTIVE MANAGEMENT: ICD-10-CM

## 2018-04-20 ENCOUNTER — OFFICE VISIT (OUTPATIENT)
Dept: PSYCHIATRY | Facility: CLINIC | Age: 38
End: 2018-04-20
Payer: COMMERCIAL

## 2018-04-20 VITALS
HEART RATE: 94 BPM | BODY MASS INDEX: 23.65 KG/M2 | SYSTOLIC BLOOD PRESSURE: 124 MMHG | DIASTOLIC BLOOD PRESSURE: 68 MMHG | HEIGHT: 62 IN | WEIGHT: 128.5 LBS

## 2018-04-20 DIAGNOSIS — F41.9 ANXIETY: ICD-10-CM

## 2018-04-20 DIAGNOSIS — F32.A DEPRESSION, UNSPECIFIED DEPRESSION TYPE: ICD-10-CM

## 2018-04-20 DIAGNOSIS — F90.0 ATTENTION DEFICIT HYPERACTIVITY DISORDER (ADHD), PREDOMINANTLY INATTENTIVE TYPE: Primary | ICD-10-CM

## 2018-04-20 PROCEDURE — 99214 OFFICE O/P EST MOD 30 MIN: CPT | Mod: S$GLB,,, | Performed by: PSYCHIATRY & NEUROLOGY

## 2018-04-20 PROCEDURE — 99999 PR PBB SHADOW E&M-EST. PATIENT-LVL II: CPT | Mod: PBBFAC,,, | Performed by: PSYCHIATRY & NEUROLOGY

## 2018-04-20 RX ORDER — FLUOXETINE HYDROCHLORIDE 20 MG/1
60 CAPSULE ORAL DAILY
Qty: 90 CAPSULE | Refills: 5 | Status: SHIPPED | OUTPATIENT
Start: 2018-04-20 | End: 2018-10-03

## 2018-04-20 RX ORDER — DEXTROAMPHETAMINE SACCHARATE, AMPHETAMINE ASPARTATE, DEXTROAMPHETAMINE SULFATE AND AMPHETAMINE SULFATE 5; 5; 5; 5 MG/1; MG/1; MG/1; MG/1
TABLET ORAL
Qty: 30 TABLET | Refills: 0 | Status: SHIPPED | OUTPATIENT
Start: 2018-05-20 | End: 2018-04-20 | Stop reason: SDUPTHER

## 2018-04-20 RX ORDER — DEXTROAMPHETAMINE SACCHARATE, AMPHETAMINE ASPARTATE, DEXTROAMPHETAMINE SULFATE AND AMPHETAMINE SULFATE 5; 5; 5; 5 MG/1; MG/1; MG/1; MG/1
TABLET ORAL
Qty: 30 TABLET | Refills: 0 | Status: SHIPPED | OUTPATIENT
Start: 2018-04-20 | End: 2018-04-20 | Stop reason: SDUPTHER

## 2018-04-20 RX ORDER — DEXTROAMPHETAMINE SACCHARATE, AMPHETAMINE ASPARTATE, DEXTROAMPHETAMINE SULFATE AND AMPHETAMINE SULFATE 5; 5; 5; 5 MG/1; MG/1; MG/1; MG/1
TABLET ORAL
Qty: 30 TABLET | Refills: 0 | Status: SHIPPED | OUTPATIENT
Start: 2018-06-19 | End: 2018-07-25 | Stop reason: SDUPTHER

## 2018-04-20 NOTE — PROGRESS NOTES
Outpatient Psychiatry Follow-Up Visit (MD/NP)    4/20/2018    Clinical Status of Patient:  Outpatient (Ambulatory)    Chief Complaint:  Stormy Carlisle is a 38 y.o. female who presents today for follow-up of depression, anxiety and attention problems.  Met with patient.      Interval History and Content of Current Session:  Interim Events/Subjective Report/Content of Current Session: 38 y/o F with hx depression, anxiety and ADHD who presents for follow up. Patient reports that she has been doing well since last appointment. She reports that her relationship with her  is improving. She is experiencing less anxiety related to the care of her children. No ssx of depression at this time. Continued anxiety though improved. Good appetite, sleeping well.     Psychiatric Medications:  · Prozac 60 mg PO daily  · Adderall 10 mg PO BID    Review of Systems   · PSYCHIATRIC: Pertinant items are noted in the narrative.  · CONSTITUTIONAL: No weight gain or loss.   · MUSCULOSKELETAL: No pain or stiffness of the joints.  · NEUROLOGIC: No weakness, sensory changes, seizures, confusion, memory loss, tremor or other abnormal movements.  · ENDOCRINE: No polydipsia or polyuria.  · INTEGUMENTARY: No rashes or lacerations.  · EYES: No exophthalmos, jaundice or blindness.  · ENT: No dizziness, tinnitus or hearing loss.  · RESPIRATORY: No shortness of breath.  · CARDIOVASCULAR: No tachycardia or chest pain.  · GASTROINTESTINAL: No nausea, vomiting, pain, constipation or diarrhea.  · GENITOURINARY: No frequency, dysuria or sexual dysfunction.    Past Medical, Family and Social History: The patient's past medical, family and social history have been reviewed and updated as appropriate within the electronic medical record - see encounter notes.    Compliance: yes    Side effects: None    Risk Parameters:  Patient reports no suicidal ideation  Patient reports no homicidal ideation  Patient reports no self-injurious behavior  Patient reports  "no violent behavior    Exam (detailed: at least 9 elements; comprehensive: all 15 elements)   Constitutional  Vitals:  Most recent vital signs, dated less than 90 days prior to this appointment, were reviewed.   Vitals:    04/20/18 1312   BP: 124/68   Pulse: 94   Weight: 58.3 kg (128 lb 8.5 oz)   Height: 5' 2" (1.575 m)      General:  unremarkable, age appropriate, normal weight, well nourished, casually dressed     Musculoskeletal  Muscle Strength/Tone:  no dystonia, no tremor   Gait & Station:  non-ataxic     Psychiatric  Speech:  no latency; no press, spontaneous   Mood & Affect:  "okay"  congruent and appropriate, full   Thought Process:  normal and logical, logical   Associations:  intact   Thought Content:  normal, no suicidality, no homicidality, delusions, or paranoia   Insight:  intact, has awareness of illness   Judgement: behavior is adequate to circumstances, age appropriate   Orientation:  grossly intact   Memory: intact for content of interview, able to remember recent events- yes, able to remember remote events- yes   Language: grossly intact   Attention Span & Concentration:  able to focus   Fund of Knowledge:  intact and appropriate to age and level of education     Assessment and Diagnosis   Status/Progress: Based on the examination today, the patient's problem(s) is/are well controlled.  New problems have not been presented today.   Co-morbidities, Diagnostic uncertainty and Lack of compliance are not complicating management of the primary condition.  There are no active rule-out diagnoses for this patient at this time.     General Impression: 38 y/o F with hx of depression, anxiety and ADHD who presents for follow up. Pt with continued anxiety though it is tolerable. No ssx of depression. Continue current regimen.       ICD-10-CM ICD-9-CM   1. Attention deficit hyperactivity disorder (ADHD), predominantly inattentive type F90.0 314.00   2. Depression, unspecified depression type F32.9 311   3. " Anxiety F41.9 300.00     Intervention/Counseling/Treatment Plan     Depression & Anxiety  · Continue Prozac 60 mg PO daily    ADHD, inattentive type  · Continue Adderall 10 mg PO BID    Discussed diagnosis, risks and benefits of proposed treatment vs alternative treatments vs no treatment, and potential side effects of these treatments. The patient expresses understanding of the above and displays the capacity to agree with this treatment given said understanding. Patient also agrees that, currently, the benefits outweigh the risks and would like to pursue/continue treatment at this time. No plans to get pregnant at this time.     Return to Clinic: 3 months       Ravinder Jurado MD  [facility]  [phone]

## 2018-07-25 RX ORDER — DEXTROAMPHETAMINE SACCHARATE, AMPHETAMINE ASPARTATE, DEXTROAMPHETAMINE SULFATE AND AMPHETAMINE SULFATE 5; 5; 5; 5 MG/1; MG/1; MG/1; MG/1
TABLET ORAL
Qty: 30 TABLET | Refills: 0 | Status: CANCELLED | OUTPATIENT
Start: 2018-07-25

## 2018-07-25 RX ORDER — DEXTROAMPHETAMINE SACCHARATE, AMPHETAMINE ASPARTATE, DEXTROAMPHETAMINE SULFATE AND AMPHETAMINE SULFATE 5; 5; 5; 5 MG/1; MG/1; MG/1; MG/1
TABLET ORAL
Qty: 30 TABLET | Refills: 0 | Status: SHIPPED | OUTPATIENT
Start: 2018-07-25 | End: 2018-08-27 | Stop reason: SDUPTHER

## 2018-08-27 RX ORDER — DEXTROAMPHETAMINE SACCHARATE, AMPHETAMINE ASPARTATE, DEXTROAMPHETAMINE SULFATE AND AMPHETAMINE SULFATE 5; 5; 5; 5 MG/1; MG/1; MG/1; MG/1
TABLET ORAL
Qty: 30 TABLET | Refills: 0 | Status: SHIPPED | OUTPATIENT
Start: 2018-08-27 | End: 2018-10-03

## 2018-10-03 ENCOUNTER — OFFICE VISIT (OUTPATIENT)
Dept: PSYCHIATRY | Facility: CLINIC | Age: 38
End: 2018-10-03
Payer: COMMERCIAL

## 2018-10-03 VITALS
WEIGHT: 129.06 LBS | HEIGHT: 62 IN | SYSTOLIC BLOOD PRESSURE: 118 MMHG | BODY MASS INDEX: 23.75 KG/M2 | DIASTOLIC BLOOD PRESSURE: 66 MMHG | HEART RATE: 78 BPM

## 2018-10-03 DIAGNOSIS — F90.0 ATTENTION DEFICIT HYPERACTIVITY DISORDER (ADHD), PREDOMINANTLY INATTENTIVE TYPE: Primary | ICD-10-CM

## 2018-10-03 DIAGNOSIS — F41.1 GAD (GENERALIZED ANXIETY DISORDER): ICD-10-CM

## 2018-10-03 DIAGNOSIS — F32.A DEPRESSION, UNSPECIFIED DEPRESSION TYPE: ICD-10-CM

## 2018-10-03 PROCEDURE — 99213 OFFICE O/P EST LOW 20 MIN: CPT | Mod: S$GLB,,, | Performed by: STUDENT IN AN ORGANIZED HEALTH CARE EDUCATION/TRAINING PROGRAM

## 2018-10-03 PROCEDURE — 99999 PR PBB SHADOW E&M-EST. PATIENT-LVL II: CPT | Mod: PBBFAC,,, | Performed by: STUDENT IN AN ORGANIZED HEALTH CARE EDUCATION/TRAINING PROGRAM

## 2018-10-03 PROCEDURE — 3008F BODY MASS INDEX DOCD: CPT | Mod: CPTII,S$GLB,, | Performed by: STUDENT IN AN ORGANIZED HEALTH CARE EDUCATION/TRAINING PROGRAM

## 2018-10-03 RX ORDER — VENLAFAXINE HYDROCHLORIDE 37.5 MG/1
37.5 CAPSULE, EXTENDED RELEASE ORAL DAILY
Qty: 30 CAPSULE | Refills: 1 | Status: SHIPPED | OUTPATIENT
Start: 2018-10-03 | End: 2018-11-20

## 2018-10-03 RX ORDER — FLUOXETINE HYDROCHLORIDE 20 MG/1
40 CAPSULE ORAL DAILY
Qty: 60 CAPSULE | Refills: 1 | Status: SHIPPED | OUTPATIENT
Start: 2018-10-03 | End: 2018-11-20

## 2018-10-03 RX ORDER — DEXTROAMPHETAMINE SACCHARATE, AMPHETAMINE ASPARTATE, DEXTROAMPHETAMINE SULFATE AND AMPHETAMINE SULFATE 5; 5; 5; 5 MG/1; MG/1; MG/1; MG/1
TABLET ORAL
Qty: 30 TABLET | Refills: 0 | Status: SHIPPED | OUTPATIENT
Start: 2018-10-03 | End: 2018-11-07 | Stop reason: SDUPTHER

## 2018-10-03 NOTE — PROGRESS NOTES
"Outpatient Psychiatry Follow-Up Visit (MD/NP)    10/3/2018    Clinical Status of Patient:  Outpatient (Ambulatory)    Chief Complaint:  Stormy Carlisle is a 38 y.o. female who presents today for follow-up of depression, anxiety and attention problems. She was previously a patient of Dr. Jurado, last seen April 20, 2018. Chart reviewed.  Met with patient.      Current Psychiatric Medications:  · Prozac 60 mg PO daily  · Adderall 10 mg PO BID- taking PRN, averaging about 10mg a day but sometimes taking both doses.     Interval History and Content of Current Session:  Interim Events/Subjective Report/Content of Current Session: 36 y/o F with hx depression, anxiety and ADHD who presents for follow up. Today, she reports things are doing somewhat alright but not ideally.    Pt continues on Adderall and Prozac.   Adderall helps with focus at work and at home and assists her with not dwelling on negatives thoughts sometimes. When not on it, it takes a ton of energy for her to try and focus on the things and people around her, but this improves on the medicine.  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 She also has 2 kids and a .     Pt finds that she expends a lot of energy interacting with others and dealing with the numerous critical thoughts that come with social interactions (eg, "are they getting together without me because I haven't been around enough?" "Should I have asked this person for coffee?").  Endorsees some degree of anxiety regarding making conversation when she doesn't have anything in common with a given person. She denies performance anxiety. Upon initial description, patient's symptoms seem concerning for social anxiety in addition to previously diagnosed JAMEY, but on further discussion she is able to clarify that her worries are more about missing out on or losing professional opportunities due to not networking, rather than anxiety about being judged or concern that she will be embarassed. Symptoms " "do not truly seem consistent with social anxiety, per se. Continues to have lots of generalized worry, but better than at one point in her life.     In general, pt says her depression is mostly controlled, but still has some significant moments. Describes a lot of incidents where she struggles to focus on the things that are going right in her life, instead focusing on the troubles she has, kat with her . Says, "it should be easier to access estrella." Struggles a lot to feel positive emotions. Describes a "general malaise" that has been there for many years, not better now. Sleep is acceptable, 6-8h/night, or more if able to. Appetite is fine. Mood overall continues to be somewhat low, and it has been even worse in the last few days. She particularly mentions trouble with her , who is out of town. She feels guilty for being glad he is out of town. They are going to counseling every two weeks, which is helping somewhat, but she feels he is narcicisstic and unwilling to help her when she needs it.  Denies any suicidal thinking/plan/intent, but feels quite anhedonic and like she doesn't care one way or another. Would never kill herself and cites kids as protective factors. Does feel like she is not doing well right now and would like to change her antidepressant.      Past med trials: Wellbutrin (helped mood/motivation, but caused her to pass out, have decrease appetite, weight loss), Paxil was very hard to come off of and does not remember how much it helped. Never tried SNRI    Review of Systems   · PSYCHIATRIC: Pertinant items are noted in the narrative.  · CONSTITUTIONAL: No weight gain or loss.   · MUSCULOSKELETAL: No pain or stiffness of the joints.  · NEUROLOGIC: No weakness, sensory changes, seizures, confusion, memory loss, tremor or other abnormal movements.  · ENDOCRINE: No polydipsia or polyuria.  · INTEGUMENTARY: No rashes or lacerations.  · EYES: No exophthalmos, jaundice or blindness.  · ENT: " "No dizziness, tinnitus or hearing loss.  · RESPIRATORY: No shortness of breath.  · CARDIOVASCULAR: No tachycardia or chest pain.  · GASTROINTESTINAL: No nausea, vomiting, pain, constipation or diarrhea.  · GENITOURINARY: No frequency, dysuria or sexual dysfunction.    Past Medical, Family and Social History: The patient's past medical, family and social history have been reviewed and updated as appropriate within the electronic medical record - see encounter notes.  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 Has been through CBT but not recently. Med trials as above plus ambien per chart in 2015    Compliance: yes    Side effects: None    Risk Parameters:  Patient reports no suicidal ideation  Patient reports no homicidal ideation  Patient reports no self-injurious behavior  Patient reports no violent behavior    Exam (detailed: at least 9 elements; comprehensive: all 15 elements)   Constitutional  Vitals:  Most recent vital signs, dated less than 90 days prior to this appointment, were reviewed.   Vitals:    10/03/18 1314   BP: 118/66   Pulse: 78   Weight: 58.6 kg (129 lb 1.3 oz)   Height: 5' 2" (1.575 m)      General:  unremarkable, age appropriate, normal weight, well nourished, casually dressed     Musculoskeletal  Muscle Strength/Tone:  no dystonia, no tremor   Gait & Station:  non-ataxic     Psychiatric  Speech:  no latency; no press, spontaneous, loud volume   Mood & Affect:  "I struggle to feel positive"  congruent and appropriate, full, reactive   Thought Process:  normal and logical, logical   Associations:  intact   Thought Content:  normal, no suicidality, no homicidality, delusions, or paranoia   Insight:  intact, has awareness of illness   Judgement: behavior is adequate to circumstances, age appropriate   Orientation:  grossly intact   Memory: intact for content of interview, able to remember recent events- yes, able to remember " remote events- yes   Language: grossly intact   Attention Span & Concentration:  able to focus   Fund of Knowledge:  intact and appropriate to age and level of education     Assessment and Diagnosis   Status/Progress: Based on the examination today, the patient's problem(s) is/are worsening.  New problems have not been presented today.   Co-morbidities, Diagnostic uncertainty and Lack of compliance are not complicating management of the primary condition.  There are no active rule-out diagnoses for this patient at this time.     General Impression: 36 y/o F with hx of depression, anxiety and ADHD who presents for follow up. Pt with continued anxiety though it is tolerable. Does complain of low mood and anhedonia somewhat worse since last appointment, consistent with some degree of depression. Inattention sx are well managed. Changes as below       ICD-10-CM ICD-9-CM   1. Attention deficit hyperactivity disorder (ADHD), predominantly inattentive type F90.0 314.00   2. Depression, unspecified depression type F32.9 311   3. JAMEY (generalized anxiety disorder) F41.1 300.02     Intervention/Counseling/Treatment Plan     Depression & Anxiety  · Cross taper from Prozac to Effexor (has no hx of SNRI use. Has felt flat/bad with higher dose of SSRI. Did well with regard to mood on Wellbutrin but had intolerable SEs. NE effect from venlafaxine may help with motivation and focus.)  · Decrease Prozac 40 mg PO daily  · Start venlafaxine XR 37.5mg daily discussed risks, benefits, AE's (incl serotonin syndrome) SE's (including but not limited to GI sx, insomnia, agitation/anxiety/activation, appetite changes, HA, hypotension, others) inherent unpredictability of illness and treatment options    ADHD, inattentive type  · Continue Adderall 10 mg PO BID.     Discussed diagnosis, risks and benefits of proposed treatment vs alternative treatments vs no treatment, and potential side effects of these treatments. The patient expresses  understanding of the above and displays the capacity to agree with this treatment given said understanding. Patient also agrees that, currently, the benefits outweigh the risks and would like to pursue/continue treatment at this time. No plans to get pregnant at this time.     Return to Clinic: 3-4 weeks, pt agrees to messsage with problems or concerns

## 2018-11-02 ENCOUNTER — PATIENT MESSAGE (OUTPATIENT)
Dept: PSYCHIATRY | Facility: CLINIC | Age: 38
End: 2018-11-02

## 2018-11-02 RX ORDER — DEXTROAMPHETAMINE SACCHARATE, AMPHETAMINE ASPARTATE, DEXTROAMPHETAMINE SULFATE AND AMPHETAMINE SULFATE 5; 5; 5; 5 MG/1; MG/1; MG/1; MG/1
TABLET ORAL
Qty: 30 TABLET | Refills: 0 | OUTPATIENT
Start: 2018-11-02

## 2018-11-07 RX ORDER — DEXTROAMPHETAMINE SACCHARATE, AMPHETAMINE ASPARTATE, DEXTROAMPHETAMINE SULFATE AND AMPHETAMINE SULFATE 5; 5; 5; 5 MG/1; MG/1; MG/1; MG/1
TABLET ORAL
Qty: 30 TABLET | Refills: 0 | Status: SHIPPED | OUTPATIENT
Start: 2018-11-07 | End: 2018-11-20

## 2018-11-20 ENCOUNTER — OFFICE VISIT (OUTPATIENT)
Dept: PSYCHIATRY | Facility: CLINIC | Age: 38
End: 2018-11-20
Payer: COMMERCIAL

## 2018-11-20 VITALS
WEIGHT: 125.88 LBS | HEIGHT: 61 IN | DIASTOLIC BLOOD PRESSURE: 73 MMHG | SYSTOLIC BLOOD PRESSURE: 107 MMHG | HEART RATE: 94 BPM | BODY MASS INDEX: 23.77 KG/M2

## 2018-11-20 DIAGNOSIS — F32.A DEPRESSION, UNSPECIFIED DEPRESSION TYPE: ICD-10-CM

## 2018-11-20 DIAGNOSIS — F90.0 ATTENTION DEFICIT HYPERACTIVITY DISORDER (ADHD), PREDOMINANTLY INATTENTIVE TYPE: Primary | ICD-10-CM

## 2018-11-20 DIAGNOSIS — F41.1 GAD (GENERALIZED ANXIETY DISORDER): ICD-10-CM

## 2018-11-20 PROCEDURE — 99999 PR PBB SHADOW E&M-EST. PATIENT-LVL II: CPT | Mod: PBBFAC,,, | Performed by: STUDENT IN AN ORGANIZED HEALTH CARE EDUCATION/TRAINING PROGRAM

## 2018-11-20 PROCEDURE — 99213 OFFICE O/P EST LOW 20 MIN: CPT | Mod: S$GLB,,, | Performed by: STUDENT IN AN ORGANIZED HEALTH CARE EDUCATION/TRAINING PROGRAM

## 2018-11-20 PROCEDURE — 3008F BODY MASS INDEX DOCD: CPT | Mod: CPTII,S$GLB,, | Performed by: STUDENT IN AN ORGANIZED HEALTH CARE EDUCATION/TRAINING PROGRAM

## 2018-11-20 RX ORDER — DEXTROAMPHETAMINE SACCHARATE, AMPHETAMINE ASPARTATE, DEXTROAMPHETAMINE SULFATE AND AMPHETAMINE SULFATE 5; 5; 5; 5 MG/1; MG/1; MG/1; MG/1
TABLET ORAL
Qty: 30 TABLET | Refills: 0 | Status: SHIPPED | OUTPATIENT
Start: 2018-12-18 | End: 2018-12-18

## 2018-11-20 RX ORDER — DEXTROAMPHETAMINE SACCHARATE, AMPHETAMINE ASPARTATE, DEXTROAMPHETAMINE SULFATE AND AMPHETAMINE SULFATE 5; 5; 5; 5 MG/1; MG/1; MG/1; MG/1
TABLET ORAL
Qty: 30 TABLET | Refills: 0 | Status: SHIPPED | OUTPATIENT
Start: 2018-11-20 | End: 2018-11-20

## 2018-11-20 RX ORDER — FLUOXETINE HYDROCHLORIDE 20 MG/1
20 CAPSULE ORAL DAILY
Qty: 30 CAPSULE | Refills: 1 | Status: SHIPPED | OUTPATIENT
Start: 2018-11-20 | End: 2018-12-18

## 2018-11-20 RX ORDER — VENLAFAXINE HYDROCHLORIDE 75 MG/1
75 CAPSULE, EXTENDED RELEASE ORAL DAILY
Qty: 30 CAPSULE | Refills: 2 | Status: SHIPPED | OUTPATIENT
Start: 2018-11-20 | End: 2018-12-18

## 2018-11-20 NOTE — PROGRESS NOTES
"Outpatient Psychiatry Follow-Up Visit (MD/NP)    11/20/2018    Clinical Status of Patient:  Outpatient (Ambulatory)    Chief Complaint:  Stormy Carlisle is a 38 y.o. female who presents today for follow-up of depression, anxiety and attention problems. Last seen 10/3/18. Chart reviewed.  Met with patient.      Current Psychiatric Medications:  · Prozac 40 mg PO daily  · venlafaxine XR 37.5mg daily (cross tapering prozac-->Effexor)  · Adderall 10 mg PO BID- taking PRN, averaging about 15mg a day, did use the full 20mg several times this celio    Interval History and Content of Current Session:  Interim Events/Subjective Report/Content of Current Session: 36 y/o F with hx depression, anxiety and ADHD who presents for follow up.   TODAY, pt feels that overall things have improved. She no longer feels so flat or numb. Mood has been "fine in general," though still not optimal and often has a lot of worry. Continues to have stress from children and work, and a recent mistake she made regarding enrolling her daughter in school. Somewhat overwhelmed with balancing home/domestic duties with work. Attention is good with Adderall as curtently dosed; does vary the dose some days herself. Sleep is good. Appetite good except a bit of nausea in past 2 days-- suspects a GI bug. Energy is fine. Denies SI/HI/AVH. Full ROS below. Would like to continue the cross taper to Effexor as she has seen some benefit so far.      Past med trials: Wellbutrin (helped mood/motivation, but caused her to pass out, have decrease appetite, weight loss), Paxil was very hard to come off of and does not remember how much it helped. Never tried SNRI    Review of Systems     · CONSTITUTIONAL: No weight gain or loss.   · MUSCULOSKELETAL: No pain or stiffness of the joints.  · NEUROLOGIC: No weakness, sensory changes, seizures, confusion, memory loss, tremor or other abnormal movements.  · ENDOCRINE: No polydipsia or polyuria.  · INTEGUMENTARY: No rashes or " "lacerations.  · ENT: No dizziness.  · RESPIRATORY: No shortness of breath.  · CARDIOVASCULAR: No tachycardia or chest pain.  · GASTROINTESTINAL: recent slight nausea x 2 days only (suspects GI bug), vomiting, pain, constipation or diarrhea.  · GENITOURINARY: No frequency, dysuria or sexual dysfunction.     Psychiatric Review Of Systems - Is patient experiencing or having changes in:  Mood/depression: "better"  interest/pleasure/anhedonia: improved  weight: no  sleep: no problems  appetite: no problems  PMR/PMA: no  Energy/anergy/fatigue: no problem  Guilt/worthlessness: some guilt, some worthlessness occasionally, not persistent,  concentration: yes, but better with Adderall  Hopelessness: no   SI: no   HI: no  hallucinations/delusions/paranoia: no   somatic symptoms: no  anxiety/panic: yes, in general a lot of worry, no panic  Manic symptoms: no   S.I.B.s/risky behavior: no  any drugs: no  alcohol: yes, minimal, less lately       Past Medical, Family and Social History: The patient's past medical, family and social history have been reviewed and updated as appropriate within the electronic medical record - see encounter notes.  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 Has been through CBT but not recently. Med trials as above plus ambien per chart in 2015    Compliance: yes    Side effects: None    Risk Parameters:  Patient reports no suicidal ideation  Patient reports no homicidal ideation  Patient reports no self-injurious behavior  Patient reports no violent behavior    Exam (detailed: at least 9 elements; comprehensive: all 15 elements)   Constitutional  Vitals:  Most recent vital signs, dated less than 90 days prior to this appointment, were reviewed.   Vitals:    11/20/18 1011   BP: 107/73   Pulse: 94   Weight: 57.1 kg (125 lb 14.1 oz)   Height: 5' 1" (1.549 m)      General:  unremarkable, age appropriate, normal weight, well " "nourished, casually dressed     Musculoskeletal  Muscle Strength/Tone:  no dystonia, no tremor   Gait & Station:  non-ataxic     Psychiatric  Speech:  no latency; no press, spontaneous, loud volume, very talkative   Mood & Affect:  "a little better"  congruent and appropriate, full, reactive   Thought Process:  normal and logical, logical   Associations:  intact   Thought Content:  normal, no suicidality, no homicidality, delusions, or paranoia   Insight:  intact, has awareness of illness   Judgement: behavior is adequate to circumstances, age appropriate   Orientation:  grossly intact   Memory: intact for content of interview, able to remember recent events- yes, able to remember remote events- yes   Language: grossly intact   Attention Span & Concentration:  able to focus   Fund of Knowledge:  intact and appropriate to age and level of education     Assessment and Diagnosis   Status/Progress: Based on the examination today, the patient's problem(s) is/are improved but not ideally controlled yet.  New problems have not been presented today.   Co-morbidities, Diagnostic uncertainty and Lack of compliance are not complicating management of the primary condition.  There are no active rule-out diagnoses for this patient at this time.     General Impression: 38 y/o F with hx of depression, anxiety and ADHD who presents for follow up. Pt with continued anxiety. Does complain of low mood and anhedonia consistent with some degree of depression, but improving with recent med changes. Inattention sx are well managed. Changes as below       ICD-10-CM ICD-9-CM   1. Attention deficit hyperactivity disorder (ADHD), predominantly inattentive type F90.0 314.00   2. Depression, unspecified depression type F32.9 311   3. JAMEY (generalized anxiety disorder) F41.1 300.02     Intervention/Counseling/Treatment Plan     Depression & Anxiety  · Continue cross taper from Prozac to Effexor (has no hx of SNRI use. Has felt flat/bad with " higher dose of SSRI. Did well with regard to mood on Wellbutrin but had intolerable SEs. NE venlafaxine may be helping with motivation and focus).  · Improving so far.  · Decrease Prozac 20 mg PO daily  · Increase venlafaxine XR 75mg daily discussed risks, benefits, AE's (incl serotonin syndrome) SE's (including but not limited to GI sx, insomnia, agitation/anxiety/activation, appetite changes, HA, hypotension, others) inherent unpredictability of illness and treatment options  · At next visit plan to stop Prozac, increase Effexor as needed     ADHD, inattentive type  · Continue Adderall 10 mg PO BID. Provided 2 month-long prescriptions today.    Not currently in psychotherapy;    Discussed diagnosis, risks and benefits of proposed treatment vs alternative treatments vs no treatment, and potential side effects of these treatments. The patient expresses understanding of the above and displays the capacity to agree with this treatment given said understanding. Patient also agrees that, currently, the benefits outweigh the risks and would like to pursue/continue treatment at this time. No plans to get pregnant at this time.     Return to Clinic: 4 weeks, pt agrees to messsage with problems or concerns

## 2018-12-18 ENCOUNTER — OFFICE VISIT (OUTPATIENT)
Dept: PSYCHIATRY | Facility: CLINIC | Age: 38
End: 2018-12-18
Payer: COMMERCIAL

## 2018-12-18 VITALS
SYSTOLIC BLOOD PRESSURE: 123 MMHG | DIASTOLIC BLOOD PRESSURE: 59 MMHG | BODY MASS INDEX: 23.91 KG/M2 | WEIGHT: 126.56 LBS | HEART RATE: 92 BPM

## 2018-12-18 DIAGNOSIS — F90.0 ATTENTION DEFICIT HYPERACTIVITY DISORDER (ADHD), PREDOMINANTLY INATTENTIVE TYPE: Primary | ICD-10-CM

## 2018-12-18 DIAGNOSIS — F33.41 RECURRENT MAJOR DEPRESSIVE DISORDER, IN PARTIAL REMISSION: ICD-10-CM

## 2018-12-18 DIAGNOSIS — F41.9 ANXIETY: ICD-10-CM

## 2018-12-18 PROCEDURE — 3008F BODY MASS INDEX DOCD: CPT | Mod: CPTII,S$GLB,, | Performed by: STUDENT IN AN ORGANIZED HEALTH CARE EDUCATION/TRAINING PROGRAM

## 2018-12-18 PROCEDURE — 99999 PR PBB SHADOW E&M-EST. PATIENT-LVL II: CPT | Mod: PBBFAC,,, | Performed by: STUDENT IN AN ORGANIZED HEALTH CARE EDUCATION/TRAINING PROGRAM

## 2018-12-18 PROCEDURE — 99213 OFFICE O/P EST LOW 20 MIN: CPT | Mod: S$GLB,,, | Performed by: STUDENT IN AN ORGANIZED HEALTH CARE EDUCATION/TRAINING PROGRAM

## 2018-12-18 RX ORDER — VENLAFAXINE HYDROCHLORIDE 75 MG/1
75 CAPSULE, EXTENDED RELEASE ORAL DAILY
Qty: 30 CAPSULE | Refills: 2 | Status: SHIPPED | OUTPATIENT
Start: 2018-12-18 | End: 2019-01-29

## 2018-12-18 RX ORDER — FLUOXETINE HYDROCHLORIDE 20 MG/1
20 CAPSULE ORAL DAILY
Qty: 30 CAPSULE | Refills: 2 | Status: SHIPPED | OUTPATIENT
Start: 2018-12-18 | End: 2019-01-29

## 2018-12-18 RX ORDER — DEXTROAMPHETAMINE SACCHARATE, AMPHETAMINE ASPARTATE, DEXTROAMPHETAMINE SULFATE AND AMPHETAMINE SULFATE 5; 5; 5; 5 MG/1; MG/1; MG/1; MG/1
TABLET ORAL
Qty: 30 TABLET | Refills: 0 | Status: SHIPPED | OUTPATIENT
Start: 2019-01-15 | End: 2019-01-29

## 2018-12-18 RX ORDER — DEXTROAMPHETAMINE SACCHARATE, AMPHETAMINE ASPARTATE, DEXTROAMPHETAMINE SULFATE AND AMPHETAMINE SULFATE 5; 5; 5; 5 MG/1; MG/1; MG/1; MG/1
TABLET ORAL
Qty: 30 TABLET | Refills: 0 | Status: SHIPPED | OUTPATIENT
Start: 2018-12-18 | End: 2018-12-18

## 2018-12-18 NOTE — PROGRESS NOTES
"Outpatient Psychiatry Follow-Up Visit (MD/NP)    12/18/2018    Clinical Status of Patient:  Outpatient (Ambulatory)    Chief Complaint:  Stormy Carlisle is a 38 y.o. female who presents today for follow-up of depression, anxiety and attention problems. Last seen 11/20/18. Chart reviewed.  Met with patient.      Current Psychiatric Medications:  · Prozac 20 mg PO daily  · venlafaxine XR 75mg daily (cross tapering prozac-->Effexor)  · Adderall 10 mg PO BID- taking PRN, using most days especially when working, uses average of 15mg daily    Interval History and Content of Current Session:  Interim Events/Subjective Report/Content of Current Session: 36 y/o F with hx depression, anxiety and ADHD who presents for follow up.   TODAY, reports she is doing "ok" on her current regimen. Has not noticed any dramatic changes since the most recent dose titration. Does have periods when she feels especially "down," apathetic with low motivation, but these don't last as long as they once might have- no more than a day. Notices she feels worse if she doesn't sleep or if she eats poorly. 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 Contnues to have some conflict/stress with her  about division of labor and home duties. Adderall helps with focus and with the anxiety as well. Feels like she can focus on one item at a time without interference from other unnecessary worries. Sleep is fine. Appetite is fine/normal. She denies any SI/HI/AVH. No SE's she can tell.     Overall continues to feel Effexor has been a good change for her. We discussed continuing her crosstaper to effexor monotherapy (plus adderall), but pt is a bit apprehensive about making changes and having to deal with potential SEs during the holiday season, which is stressful for her. 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       Past med trials: Wellbutrin (helped mood/motivation, but caused her to pass out, have decrease appetite, weight loss), Paxil was very hard to come off of and does not remember how much it helped. Never tried SNRI    Review of Systems     · CONSTITUTIONAL: No weight gain or loss.   · MUSCULOSKELETAL: No pain or stiffness of the joints.  · NEUROLOGIC: No weakness, sensory changes, seizures, confusion, memory loss, tremor or other abnormal movements.  · ENDOCRINE: No polydipsia or polyuria.  · INTEGUMENTARY: No rashes or lacerations.  · ENT: No dizziness.  · RESPIRATORY: No shortness of breath.  · CARDIOVASCULAR: No tachycardia or chest pain.  · GASTROINTESTINAL: No nausea, vomiting, pain, constipation or diarrhea.  · GENITOURINARY: No frequency, dysuria or sexual dysfunction.     Psychiatric Review Of Systems - Is patient experiencing or having changes in:  Mood/depression: "probably better overall"  interest/pleasure/anhedonia: improved  weight: no  sleep: no problems  appetite: no problems  PMR/PMA: no  Energy/anergy/fatigue: no problem  Guilt/worthlessness: some guilt, some worthlessness occasionally, not persistent, unchanged  concentration: yes, but better with Adderall  Hopelessness: no   SI: no   HI: no  hallucinations/delusions/paranoia: no   somatic symptoms: no  anxiety/panic: yes, in general a lot of worry, no panic, anxiety improves with adderall   Manic symptoms: no   S.I.B.s/risky behavior: no  any drugs: no  alcohol: yes, minimal      Past Medical, Family and Social History: The patient's past medical, family and social history have been reviewed and updated as appropriate within the electronic medical record - see encounter notes.  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 Has been through CBT but not recently. Med trials as above plus ambien per chart " "in 2015    Compliance: yes    Side effects: None    Risk Parameters:  Patient reports no suicidal ideation  Patient reports no homicidal ideation  Patient reports no self-injurious behavior  Patient reports no violent behavior    Exam (detailed: at least 9 elements; comprehensive: all 15 elements)   Constitutional  Vitals:  Most recent vital signs, dated less than 90 days prior to this appointment, were reviewed.   Vitals:    12/18/18 1058   BP: (!) 123/59   Pulse: 92   Weight: 57.4 kg (126 lb 8.7 oz)    drinking coffee during vitals and apt   General:  unremarkable, age appropriate, normal weight, well nourished, casually dressed     Musculoskeletal  Muscle Strength/Tone:  no dystonia, no tremor   Gait & Station:  non-ataxic     Psychiatric  Speech:  no latency; no press, spontaneous, loud volume, very talkative   Mood & Affect:  "better overall"  congruent and appropriate, full, reactive   Thought Process:  normal and logical, logical   Associations:  intact   Thought Content:  normal, no suicidality, no homicidality, delusions, or paranoia   Insight:  intact, has awareness of illness   Judgement: behavior is adequate to circumstances, age appropriate   Orientation:  grossly intact   Memory: intact for content of interview, able to remember recent events- yes, able to remember remote events- yes   Language: grossly intact   Attention Span & Concentration:  able to focus   Fund of Knowledge:  intact and appropriate to age and level of education     Assessment and Diagnosis   Status/Progress: Based on the examination today, the patient's problem(s) is/are improved but not ideally controlled yet.  New problems have not been presented today.   Co-morbidities, Diagnostic uncertainty and Lack of compliance are not complicating management of the primary condition.  There are no active rule-out diagnoses for this patient at this time.     General Impression: 36 y/o F with hx of depression, anxiety and ADHD who presents " for follow up. Pt with continued anxiety but better with stimulant. Mood is stable, no SI, Will hold off on finishing the antidepressant cross taper until after acute holiday stressors are completed      ICD-10-CM ICD-9-CM   1. Attention deficit hyperactivity disorder (ADHD), predominantly inattentive type F90.0 314.00   2. Recurrent major depressive disorder, in partial remission F33.41 296.35   3. Anxiety F41.9 300.00     Intervention/Counseling/Treatment Plan     Depression & Anxiety  · Hold at current stage in cross taper from Prozac to Effexor until acute holiday stresses supriya (has no hx of SNRI use. Has felt flat/bad with higher dose of SSRI. Did well with regard to mood on Wellbutrin but had intolerable SEs. NE venlafaxine may be helping with motivation and focus).  · Improving so far.  · Continue Prozac 20 mg PO daily  · Continue venlafaxine XR 75mg daily discussed risks, benefits, AE's (incl serotonin syndrome) SE's (including but not limited to GI sx, insomnia, agitation/anxiety/activation, appetite changes, HA, hypotension, others) inherent unpredictability of illness and treatment options  · At next visit plan to stop Prozac, increase Effexor as needed, or hold Effexor dose as-is     ADHD, inattentive type  · Continue Adderall IR 10 mg PO BID. Provided 2 month-long prescriptions today.    Not currently in psychotherapy;    Discussed diagnosis, risks and benefits of proposed treatment vs alternative treatments vs no treatment, and potential side effects of these treatments. The patient expresses understanding of the above and displays the capacity to agree with this treatment given said understanding. Patient also agrees that, currently, the benefits outweigh the risks and would like to pursue/continue treatment at this time. No plans to get pregnant at this time.     Return to Clinic: 4-6 weeks, pt agrees to messsage with problems or concerns

## 2019-01-29 ENCOUNTER — OFFICE VISIT (OUTPATIENT)
Dept: PSYCHIATRY | Facility: CLINIC | Age: 39
End: 2019-01-29
Payer: COMMERCIAL

## 2019-01-29 VITALS
HEART RATE: 89 BPM | SYSTOLIC BLOOD PRESSURE: 112 MMHG | BODY MASS INDEX: 24.58 KG/M2 | HEIGHT: 61 IN | WEIGHT: 130.19 LBS | DIASTOLIC BLOOD PRESSURE: 72 MMHG

## 2019-01-29 DIAGNOSIS — F33.41 RECURRENT MAJOR DEPRESSIVE DISORDER, IN PARTIAL REMISSION: Primary | ICD-10-CM

## 2019-01-29 DIAGNOSIS — F90.0 ATTENTION DEFICIT HYPERACTIVITY DISORDER (ADHD), PREDOMINANTLY INATTENTIVE TYPE: ICD-10-CM

## 2019-01-29 DIAGNOSIS — F41.1 GAD (GENERALIZED ANXIETY DISORDER): ICD-10-CM

## 2019-01-29 PROCEDURE — 99213 PR OFFICE/OUTPT VISIT, EST, LEVL III, 20-29 MIN: ICD-10-PCS | Mod: S$GLB,,, | Performed by: STUDENT IN AN ORGANIZED HEALTH CARE EDUCATION/TRAINING PROGRAM

## 2019-01-29 PROCEDURE — 3008F PR BODY MASS INDEX (BMI) DOCUMENTED: ICD-10-PCS | Mod: CPTII,S$GLB,, | Performed by: STUDENT IN AN ORGANIZED HEALTH CARE EDUCATION/TRAINING PROGRAM

## 2019-01-29 PROCEDURE — 99213 OFFICE O/P EST LOW 20 MIN: CPT | Mod: S$GLB,,, | Performed by: STUDENT IN AN ORGANIZED HEALTH CARE EDUCATION/TRAINING PROGRAM

## 2019-01-29 PROCEDURE — 99999 PR PBB SHADOW E&M-EST. PATIENT-LVL II: ICD-10-PCS | Mod: PBBFAC,,, | Performed by: STUDENT IN AN ORGANIZED HEALTH CARE EDUCATION/TRAINING PROGRAM

## 2019-01-29 PROCEDURE — 99999 PR PBB SHADOW E&M-EST. PATIENT-LVL II: CPT | Mod: PBBFAC,,, | Performed by: STUDENT IN AN ORGANIZED HEALTH CARE EDUCATION/TRAINING PROGRAM

## 2019-01-29 PROCEDURE — 3008F BODY MASS INDEX DOCD: CPT | Mod: CPTII,S$GLB,, | Performed by: STUDENT IN AN ORGANIZED HEALTH CARE EDUCATION/TRAINING PROGRAM

## 2019-01-29 RX ORDER — VENLAFAXINE HYDROCHLORIDE 150 MG/1
150 CAPSULE, EXTENDED RELEASE ORAL DAILY
Qty: 30 CAPSULE | Refills: 2 | Status: SHIPPED | OUTPATIENT
Start: 2019-01-29 | End: 2019-03-26 | Stop reason: SDUPTHER

## 2019-01-29 RX ORDER — DEXTROAMPHETAMINE SACCHARATE, AMPHETAMINE ASPARTATE, DEXTROAMPHETAMINE SULFATE AND AMPHETAMINE SULFATE 2.5; 2.5; 2.5; 2.5 MG/1; MG/1; MG/1; MG/1
10 TABLET ORAL 2 TIMES DAILY
Qty: 60 TABLET | Refills: 0 | Status: SHIPPED | OUTPATIENT
Start: 2019-02-26 | End: 2019-01-29 | Stop reason: SDUPTHER

## 2019-01-29 RX ORDER — DEXTROAMPHETAMINE SACCHARATE, AMPHETAMINE ASPARTATE, DEXTROAMPHETAMINE SULFATE AND AMPHETAMINE SULFATE 2.5; 2.5; 2.5; 2.5 MG/1; MG/1; MG/1; MG/1
10 TABLET ORAL 2 TIMES DAILY
Qty: 60 TABLET | Refills: 0 | Status: SHIPPED | OUTPATIENT
Start: 2019-01-29 | End: 2019-03-26 | Stop reason: SDUPTHER

## 2019-01-29 RX ORDER — DEXTROAMPHETAMINE SACCHARATE, AMPHETAMINE ASPARTATE, DEXTROAMPHETAMINE SULFATE AND AMPHETAMINE SULFATE 2.5; 2.5; 2.5; 2.5 MG/1; MG/1; MG/1; MG/1
10 TABLET ORAL 2 TIMES DAILY
Qty: 60 TABLET | Refills: 0 | Status: SHIPPED | OUTPATIENT
Start: 2019-01-29 | End: 2019-01-29

## 2019-01-29 RX ORDER — DEXTROAMPHETAMINE SACCHARATE, AMPHETAMINE ASPARTATE, DEXTROAMPHETAMINE SULFATE AND AMPHETAMINE SULFATE 5; 5; 5; 5 MG/1; MG/1; MG/1; MG/1
1 TABLET ORAL 2 TIMES DAILY
Qty: 60 TABLET | Refills: 0 | Status: SHIPPED | OUTPATIENT
Start: 2019-01-29 | End: 2019-01-29

## 2019-01-29 NOTE — PROGRESS NOTES
"Outpatient Psychiatry Follow-Up Visit (MD/NP)    1/29/2019    Clinical Status of Patient:  Outpatient (Ambulatory)    Chief Complaint:  Stormy Carlisle is a 38 y.o. female who presents today for follow-up of depression, anxiety and attention problems. Last seen 112/18/18. Chart reviewed.  Met with patient.      Current Psychiatric Medications:  · Prozac 20 mg PO daily  · venlafaxine XR 75mg daily (cross tapering prozac-->Effexor)  · Adderall 10 mg PO BID- taking PRN, using most days especially when working, uses average of 15mg daily    Interval History and Content of Current Session:  Interim Events/Subjective Report/Content of Current Session: 36 y/o F with hx depression, anxiety and ADHD who presents for follow up.   TODAY, Pt reports she is doing "fine."  She found out that she did not actually miss the deadline to enrol her daughter in a new school, but she still has decided to keep her daughter at her same school. Feeling relieved that this decision has been made. Also, has had a few new projects come up at work, which have gone well. Feels better in a lot of ways, particularly that her work is not futile. Also feeling more connected and less isolated. Really enjoying working in [employer] and getting some validation. Feels no longer "stagnant" at work.     Mood overall is "steady," though has a few dips if she sleeps badly or if stressful family member visits. Feels better when she eats more healthy. Fewer apathetic, down days. Sleep is fine, no problem. Anxiety is fairly low. Using Adderall 10mg qAM and 5-10mg in the afternoon, more frequently 10mg than it used to be. With Effexor she does notice feeling more energetic and occasionally more "scattered," but also mood is better. Suspects this might be one reason for using slightly more Adderall, but also is doing more work that demands it.  She denies any SI/HI/AVH. No SE's she can tell.     Overall continues to feel Effexor has been a good change for her. " "Ready to try completing her cross-taper from Prozac today.        Past med trials: Wellbutrin (helped mood/motivation, but caused her to pass out, have decrease appetite, weight loss), Paxil was very hard to come off of and does not remember how much it helped. Never tried SNRI. Prozac worked but was unsufficiently helpful    Review of Systems     · CONSTITUTIONAL: No weight gain or loss.   · MUSCULOSKELETAL: No pain or stiffness of the joints.  · NEUROLOGIC: No weakness, sensory changes, seizures, confusion, memory loss, tremor or other abnormal movements.  · ENDOCRINE: No polydipsia or polyuria.  · INTEGUMENTARY: No rashes or lacerations.  · ENT: No dizziness.  · RESPIRATORY: No shortness of breath.  · CARDIOVASCULAR: No tachycardia or chest pain.  · GASTROINTESTINAL: No nausea, vomiting, pain, constipation or diarrhea.  · GENITOURINARY: No frequency, dysuria or sexual dysfunction.     Psychiatric Review Of Systems - Is patient experiencing or having changes in:  Mood/depression: "ok, fine"  interest/pleasure/anhedonia: good, stable, better  weight: no  sleep: no problems  appetite: no problems  PMR/PMA: no  Energy/anergy/fatigue: no problem  Guilt/worthlessness: some guilt, some worthlessness occasionally,mild unchanged  concentration: yes, but better with Adderall  Hopelessness: no   SI: no   HI: no  hallucinations/delusions/paranoia: no   somatic symptoms: no  anxiety/panic: yes, in general a lot of worry, no panic, anxiety improves with adderall   Manic symptoms: no   S.I.B.s/risky behavior: no  any drugs: no  alcohol: yes, minimal      Past Medical, Family and Social History: The patient's past medical, family and social history have been reviewed and updated as appropriate within the electronic medical record - see encounter notes.  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 Has been through CBT but not recently. Med trials as above " "plus ambien per chart in 2015    Compliance: yes    Side effects: None    Risk Parameters:  Patient reports no suicidal ideation  Patient reports no homicidal ideation  Patient reports no self-injurious behavior  Patient reports no violent behavior    Exam (detailed: at least 9 elements; comprehensive: all 15 elements)   Constitutional  Vitals:  Most recent vital signs, dated less than 90 days prior to this appointment, were reviewed.   Vitals:    01/29/19 1134   BP: 112/72   Pulse: 89   Weight: 59.1 kg (130 lb 2.9 oz)   Height: 5' 1" (1.549 m)      General:  unremarkable, age appropriate, normal weight, well nourished, casually dressed     Musculoskeletal  Muscle Strength/Tone:  no dystonia, no tremor   Gait & Station:  non-ataxic     Psychiatric  Speech:  no latency; no press, spontaneous, loud volume, very talkative   Mood & Affect:  "fine, ok"  congruent and appropriate, full, reactive   Thought Process:  normal and logical, logical   Associations:  intact   Thought Content:  normal, no suicidality, no homicidality, delusions, or paranoia   Insight:  intact, has awareness of illness   Judgement: behavior is adequate to circumstances, age appropriate   Orientation:  grossly intact   Memory: intact for content of interview, able to remember recent events- yes, able to remember remote events- yes   Language: grossly intact   Attention Span & Concentration:  able to focus   Fund of Knowledge:  intact and appropriate to age and level of education     Assessment and Diagnosis   Status/Progress: Based on the examination today, the patient's problem(s) is/are fairly well controlled.  New problems have not been presented today.   Co-morbidities, Diagnostic uncertainty and Lack of compliance are not complicating management of the primary condition.  There are no active rule-out diagnoses for this patient at this time.     General Impression: 38 y/o F with hx of depression, anxiety and ADHD who presents for follow up. Pt " with continued anxiety but better with stimulant. Mood is stable, no SI, will finish transition from SSRI to SNRI today      ICD-10-CM ICD-9-CM   1. Recurrent major depressive disorder, in partial remission F33.41 296.35   2. JAMEY (generalized anxiety disorder) F41.1 300.02   3. Attention deficit hyperactivity disorder (ADHD), predominantly inattentive type F90.0 314.00     Intervention/Counseling/Treatment Plan     Depression & Anxiety  · Complete cross taper from Prozac to Effexor  · Improving so far.  · Stop Prozac  · Increase to venlafaxine XR 150mg daily.  · Discussed risk of serotonin syndrome during Prozac washout period  ·  discussed risks, benefits, AE's (incl serotonin syndrome) SE's (including but not limited to GI sx, insomnia, agitation/anxiety/activation, appetite changes, HA, hypotension, others) inherent unpredictability of illness and treatment options     ADHD, inattentive type  · Continue Adderall IR 10 mg PO BID. Provided 2 month-long prescriptions today.     Not currently in psychotherapy;    Discussed diagnosis, risks and benefits of proposed treatment vs alternative treatments vs no treatment, and potential side effects of these treatments. The patient expresses understanding of the above and displays the capacity to agree with this treatment given said understanding. Patient also agrees that, currently, the benefits outweigh the risks and would like to pursue/continue treatment at this time. No plans to get pregnant at this time.     Return to Clinic: 4-6 weeks, pt agrees to messsage with problems or concerns especially with cross-taper

## 2019-03-26 ENCOUNTER — OFFICE VISIT (OUTPATIENT)
Dept: PSYCHIATRY | Facility: CLINIC | Age: 39
End: 2019-03-26
Payer: COMMERCIAL

## 2019-03-26 VITALS
BODY MASS INDEX: 24.56 KG/M2 | DIASTOLIC BLOOD PRESSURE: 58 MMHG | HEART RATE: 91 BPM | WEIGHT: 129.94 LBS | SYSTOLIC BLOOD PRESSURE: 94 MMHG

## 2019-03-26 DIAGNOSIS — F41.1 GAD (GENERALIZED ANXIETY DISORDER): ICD-10-CM

## 2019-03-26 DIAGNOSIS — F33.42 RECURRENT MAJOR DEPRESSIVE DISORDER, IN FULL REMISSION: Primary | ICD-10-CM

## 2019-03-26 DIAGNOSIS — F90.0 ATTENTION DEFICIT HYPERACTIVITY DISORDER (ADHD), PREDOMINANTLY INATTENTIVE TYPE: ICD-10-CM

## 2019-03-26 PROCEDURE — 99213 PR OFFICE/OUTPT VISIT, EST, LEVL III, 20-29 MIN: ICD-10-PCS | Mod: S$GLB,,, | Performed by: STUDENT IN AN ORGANIZED HEALTH CARE EDUCATION/TRAINING PROGRAM

## 2019-03-26 PROCEDURE — 99999 PR PBB SHADOW E&M-EST. PATIENT-LVL II: ICD-10-PCS | Mod: PBBFAC,,, | Performed by: STUDENT IN AN ORGANIZED HEALTH CARE EDUCATION/TRAINING PROGRAM

## 2019-03-26 PROCEDURE — 3008F BODY MASS INDEX DOCD: CPT | Mod: CPTII,S$GLB,, | Performed by: STUDENT IN AN ORGANIZED HEALTH CARE EDUCATION/TRAINING PROGRAM

## 2019-03-26 PROCEDURE — 99999 PR PBB SHADOW E&M-EST. PATIENT-LVL II: CPT | Mod: PBBFAC,,, | Performed by: STUDENT IN AN ORGANIZED HEALTH CARE EDUCATION/TRAINING PROGRAM

## 2019-03-26 PROCEDURE — 3008F PR BODY MASS INDEX (BMI) DOCUMENTED: ICD-10-PCS | Mod: CPTII,S$GLB,, | Performed by: STUDENT IN AN ORGANIZED HEALTH CARE EDUCATION/TRAINING PROGRAM

## 2019-03-26 PROCEDURE — 99213 OFFICE O/P EST LOW 20 MIN: CPT | Mod: S$GLB,,, | Performed by: STUDENT IN AN ORGANIZED HEALTH CARE EDUCATION/TRAINING PROGRAM

## 2019-03-26 RX ORDER — DEXTROAMPHETAMINE SACCHARATE, AMPHETAMINE ASPARTATE, DEXTROAMPHETAMINE SULFATE AND AMPHETAMINE SULFATE 2.5; 2.5; 2.5; 2.5 MG/1; MG/1; MG/1; MG/1
10 TABLET ORAL 2 TIMES DAILY
Qty: 60 TABLET | Refills: 0 | Status: SHIPPED | OUTPATIENT
Start: 2019-04-23 | End: 2019-03-26 | Stop reason: SDUPTHER

## 2019-03-26 RX ORDER — DEXTROAMPHETAMINE SACCHARATE, AMPHETAMINE ASPARTATE, DEXTROAMPHETAMINE SULFATE AND AMPHETAMINE SULFATE 2.5; 2.5; 2.5; 2.5 MG/1; MG/1; MG/1; MG/1
10 TABLET ORAL 2 TIMES DAILY
Qty: 60 TABLET | Refills: 0 | Status: SHIPPED | OUTPATIENT
Start: 2019-03-26 | End: 2019-03-26 | Stop reason: SDUPTHER

## 2019-03-26 RX ORDER — VENLAFAXINE HYDROCHLORIDE 150 MG/1
150 CAPSULE, EXTENDED RELEASE ORAL DAILY
Qty: 30 CAPSULE | Refills: 4 | Status: SHIPPED | OUTPATIENT
Start: 2019-03-26 | End: 2019-05-15

## 2019-03-26 RX ORDER — DEXTROAMPHETAMINE SACCHARATE, AMPHETAMINE ASPARTATE, DEXTROAMPHETAMINE SULFATE AND AMPHETAMINE SULFATE 2.5; 2.5; 2.5; 2.5 MG/1; MG/1; MG/1; MG/1
10 TABLET ORAL 2 TIMES DAILY
Qty: 60 TABLET | Refills: 0 | Status: SHIPPED | OUTPATIENT
Start: 2019-05-21 | End: 2019-05-29 | Stop reason: SDUPTHER

## 2019-03-26 NOTE — PROGRESS NOTES
"Outpatient Psychiatry Follow-Up Visit (MD/NP)    3/26/2019    Clinical Status of Patient:  Outpatient (Ambulatory)    Chief Complaint:  Stormy Carlisle is a 39 y.o. female who presents today for follow-up of depression, anxiety and attention problems. Last seen 1/19/19. Chart reviewed.  Met with patient.      Current Psychiatric Medications:  · venlafaxine XR 150mg daily (cross tapered prozac-->Effexor)  · Adderall 10 mg PO BID- taking PRN, using most days especially when working, uses average of 15mg daily    Interval History and Content of Current Session:  Interim Events/Subjective Report/Content of Current Session: 38 y/o F with hx depression, anxiety and ADHD who presents for follow up.   TODAY, pt reports she completed the cross taper without much issue. She felt "down" a bit for a couple of days but then got better. She finds that Effexor is not causing the emotional numbing that Prozac did. She feels more alert and engaged, which is what she was hoping for. Feels the satisfaction she wants to feel from elements of her life. Also notices improvement in her overall energy level, and decreased rumination. Has an easier time seeing the postive in things. She did not notice much agitation, anxiety, or other SEs. Does find that she is sleeping a bit more lightly than she used to do, wakes up in the night occasionally, but it is not interfering with rest or daytime energy. Not a huge issue. Appetite is normal.  Minimal wt change. She denies any persistent depression, tearfulness,  SI/HI/AVH. Any anxiety and worry are well-controlled too.     No SEs in particular, but does notice a tingly sensation if she misses a dose of Effexor.    Adderall continues to be useful for focus, organization, and clarity of thought. Feels like Adderall is actually working better now that she is on Effexor. Takes 10mg BID or 10mg qAM and 5mg qHS, rarely skipping days completely. No problems noted.       Past med trials: Wellbutrin (helped " "mood/motivation, but caused her to pass out, have decrease appetite, weight loss), Paxil was very hard to come off of and does not remember how much it helped. Never tried SNRI. Prozac worked but was unsufficiently helpful    Review of Systems     · CONSTITUTIONAL: No weight gain or loss.   · MUSCULOSKELETAL: No pain or stiffness of the joints.  · NEUROLOGIC: No weakness, sensory changes, seizures, confusion, memory loss, tremor or other abnormal movements.  · ENDOCRINE: No polydipsia or polyuria.  · INTEGUMENTARY: No rashes or lacerations.  · ENT: No dizziness.  · RESPIRATORY: No shortness of breath.  · CARDIOVASCULAR: No tachycardia or chest pain.  · GASTROINTESTINAL: No nausea, vomiting, pain, constipation or diarrhea.  · GENITOURINARY: No frequency, dysuria or sexual dysfunction.   · PSYCHIATRIC: as noted in HPI    Psychiatric Review Of Systems - Is patient experiencing or having changes in:  Mood/depression: "good:  interest/pleasure/anhedonia: good, stable, better  weight: no  sleep: no problems  appetite: no problems  PMR/PMA: no  Energy/anergy/fatigue: no problem  Guilt/worthlessness: better  concentration: yes, but much better with Adderall  Hopelessness: no   SI: no   HI: no  hallucinations/delusions/paranoia: no   somatic symptoms: no  anxiety/panic: yes, in general a lot of worry/anxiety, but much worse lately, no panic,  Manic symptoms: no   S.I.B.s/risky behavior: no  any drugs: no  alcohol: yes, minimal      Past Medical, Family and Social History: The patient's past medical, family and social history have been reviewed and updated as appropriate within the electronic medical record - see encounter notes.  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 Has been through CBT but not recently. Med trials as above plus ambien per chart in 2015    Compliance: yes    Side effects: None    Risk Parameters:  Patient reports no suicidal " "ideation  Patient reports no homicidal ideation  Patient reports no self-injurious behavior  Patient reports no violent behavior    Exam (detailed: at least 9 elements; comprehensive: all 15 elements)   Constitutional  Vitals:  Most recent vital signs, dated less than 90 days prior to this appointment, were reviewed.   Vitals:    03/26/19 1111   BP: (!) 94/58   Pulse: 91   Weight: 59 kg (129 lb 15.4 oz)      General:  unremarkable, age appropriate, normal weight, well nourished, casually dressed     Musculoskeletal  Muscle Strength/Tone:  no dystonia, no tremor   Gait & Station:  non-ataxic     Psychiatric  Speech:  no latency; no press, spontaneous, loud volume, very talkative   Mood & Affect:  "good"  congruent and appropriate, full, reactive, euthymic   Thought Process:  normal and logical, logical   Associations:  intact   Thought Content:  normal, no suicidality, no homicidality, delusions, or paranoia   Insight:  intact, has awareness of illness   Judgement: behavior is adequate to circumstances, age appropriate   Orientation:  grossly intact   Memory: intact for content of interview, able to remember recent events- yes, able to remember remote events- yes   Language: grossly intact   Attention Span & Concentration:  able to focus   Fund of Knowledge:  intact and appropriate to age and level of education     Assessment and Diagnosis   Status/Progress: Based on the examination today, the patient's problem(s) is/are well controlled.  New problems have not been presented today.   Co-morbidities, Diagnostic uncertainty and Lack of compliance are not complicating management of the primary condition.  There are no active rule-out diagnoses for this patient at this time.     General Impression: 36 y/o F with hx of depression, anxiety and ADHD who presents for follow up. Mood and anxiety improved and well controlled on Effexor instead of prozac. Attention well controlled with Adderall as-is. Plan as below.       " ICD-10-CM ICD-9-CM   1. Recurrent major depressive disorder, in full remission F33.42 296.36   2. JAMEY (generalized anxiety disorder) F41.1 300.02   3. Attention deficit hyperactivity disorder (ADHD), predominantly inattentive type F90.0 314.00     Intervention/Counseling/Treatment Plan     Depression & Anxiety  · Continue venlafaxine XR 150mg daily. Doing better on this than on Prozac so far  ·  Previously discussed risks, benefits, AE's (incl serotonin syndrome) SE's (including but not limited to GI sx, insomnia, agitation/anxiety/activation, appetite changes, HA, hypotension, others) inherent unpredictability of illness and treatment options     ADHD, inattentive type  · Continue Adderall IR 10 mg PO BID. Provided 3 month-long prescriptions today.     Not currently in psychotherapy; does not feel needed at this time. Doing well.     Discussed diagnosis, risks and benefits of proposed treatment vs alternative treatments vs no treatment, and potential side effects of these treatments. The patient expresses understanding of the above and displays the capacity to agree with this treatment given said understanding. Patient also agrees that, currently, the benefits outweigh the risks and would like to pursue/continue treatment at this time. No plans to get pregnant at this time.     Return to Clinic: 3 months, pt agrees to messsage with problems or concerns that arise before that

## 2019-05-15 ENCOUNTER — PATIENT MESSAGE (OUTPATIENT)
Dept: PSYCHIATRY | Facility: CLINIC | Age: 39
End: 2019-05-15

## 2019-05-15 RX ORDER — VENLAFAXINE HYDROCHLORIDE 75 MG/1
75 CAPSULE, EXTENDED RELEASE ORAL DAILY
Qty: 30 CAPSULE | Refills: 2 | Status: SHIPPED | OUTPATIENT
Start: 2019-05-15 | End: 2019-05-29 | Stop reason: SDUPTHER

## 2019-05-15 NOTE — PROGRESS NOTES
Pt wrote with concern of Effexor causing insomnia and resulting fatigue, see email. Sent new Rx for 75mg daily instead of 150mg daily and notified pt.

## 2019-05-28 ENCOUNTER — PATIENT MESSAGE (OUTPATIENT)
Dept: PSYCHIATRY | Facility: CLINIC | Age: 39
End: 2019-05-28

## 2019-05-29 ENCOUNTER — OFFICE VISIT (OUTPATIENT)
Dept: PSYCHIATRY | Facility: CLINIC | Age: 39
End: 2019-05-29
Payer: COMMERCIAL

## 2019-05-29 VITALS
SYSTOLIC BLOOD PRESSURE: 114 MMHG | HEIGHT: 61 IN | BODY MASS INDEX: 24.37 KG/M2 | WEIGHT: 129.06 LBS | HEART RATE: 91 BPM | DIASTOLIC BLOOD PRESSURE: 66 MMHG

## 2019-05-29 DIAGNOSIS — F41.1 GAD (GENERALIZED ANXIETY DISORDER): ICD-10-CM

## 2019-05-29 DIAGNOSIS — F33.42 RECURRENT MAJOR DEPRESSIVE DISORDER, IN FULL REMISSION: ICD-10-CM

## 2019-05-29 DIAGNOSIS — F90.0 ATTENTION DEFICIT HYPERACTIVITY DISORDER (ADHD), PREDOMINANTLY INATTENTIVE TYPE: Primary | ICD-10-CM

## 2019-05-29 PROCEDURE — 3008F BODY MASS INDEX DOCD: CPT | Mod: CPTII,S$GLB,, | Performed by: STUDENT IN AN ORGANIZED HEALTH CARE EDUCATION/TRAINING PROGRAM

## 2019-05-29 PROCEDURE — 99999 PR PBB SHADOW E&M-EST. PATIENT-LVL II: ICD-10-PCS | Mod: PBBFAC,,, | Performed by: STUDENT IN AN ORGANIZED HEALTH CARE EDUCATION/TRAINING PROGRAM

## 2019-05-29 PROCEDURE — 99213 PR OFFICE/OUTPT VISIT, EST, LEVL III, 20-29 MIN: ICD-10-PCS | Mod: S$GLB,,, | Performed by: STUDENT IN AN ORGANIZED HEALTH CARE EDUCATION/TRAINING PROGRAM

## 2019-05-29 PROCEDURE — 99213 OFFICE O/P EST LOW 20 MIN: CPT | Mod: S$GLB,,, | Performed by: STUDENT IN AN ORGANIZED HEALTH CARE EDUCATION/TRAINING PROGRAM

## 2019-05-29 PROCEDURE — 3008F PR BODY MASS INDEX (BMI) DOCUMENTED: ICD-10-PCS | Mod: CPTII,S$GLB,, | Performed by: STUDENT IN AN ORGANIZED HEALTH CARE EDUCATION/TRAINING PROGRAM

## 2019-05-29 PROCEDURE — 99999 PR PBB SHADOW E&M-EST. PATIENT-LVL II: CPT | Mod: PBBFAC,,, | Performed by: STUDENT IN AN ORGANIZED HEALTH CARE EDUCATION/TRAINING PROGRAM

## 2019-05-29 RX ORDER — DEXTROAMPHETAMINE SACCHARATE, AMPHETAMINE ASPARTATE, DEXTROAMPHETAMINE SULFATE AND AMPHETAMINE SULFATE 2.5; 2.5; 2.5; 2.5 MG/1; MG/1; MG/1; MG/1
10 TABLET ORAL 2 TIMES DAILY
Qty: 60 TABLET | Refills: 0 | Status: SHIPPED | OUTPATIENT
Start: 2019-06-26 | End: 2019-07-08 | Stop reason: SDUPTHER

## 2019-05-29 RX ORDER — VENLAFAXINE HYDROCHLORIDE 75 MG/1
75 CAPSULE, EXTENDED RELEASE ORAL DAILY
Qty: 30 CAPSULE | Refills: 4 | Status: SHIPPED | OUTPATIENT
Start: 2019-05-29 | End: 2019-07-08

## 2019-05-29 RX ORDER — VENLAFAXINE HYDROCHLORIDE 75 MG/1
75 CAPSULE, EXTENDED RELEASE ORAL DAILY
Qty: 30 CAPSULE | Refills: 2 | Status: CANCELLED | OUTPATIENT
Start: 2019-05-29

## 2019-05-29 RX ORDER — DEXTROAMPHETAMINE SACCHARATE, AMPHETAMINE ASPARTATE, DEXTROAMPHETAMINE SULFATE AND AMPHETAMINE SULFATE 2.5; 2.5; 2.5; 2.5 MG/1; MG/1; MG/1; MG/1
10 TABLET ORAL 2 TIMES DAILY
Qty: 60 TABLET | Refills: 0 | Status: SHIPPED | OUTPATIENT
Start: 2019-05-29 | End: 2019-09-23

## 2019-05-29 RX ORDER — DEXTROAMPHETAMINE SACCHARATE, AMPHETAMINE ASPARTATE, DEXTROAMPHETAMINE SULFATE AND AMPHETAMINE SULFATE 2.5; 2.5; 2.5; 2.5 MG/1; MG/1; MG/1; MG/1
10 TABLET ORAL 2 TIMES DAILY
Qty: 60 TABLET | Refills: 0 | Status: SHIPPED | OUTPATIENT
Start: 2019-07-24 | End: 2019-07-08 | Stop reason: SDUPTHER

## 2019-05-29 NOTE — PROGRESS NOTES
"Outpatient Psychiatry Follow-Up Visit (MD/NP)    5/29/2019    Clinical Status of Patient:  Outpatient (Ambulatory)    Chief Complaint:  Stormy Carlisle is a 39 y.o. female who presents today for follow-up of depression, anxiety and attention problems. Last seen 2/26/19. Chart reviewed.  Met with patient.      Current Psychiatric Medications:  · venlafaxine XR 57mg daily (cross tapered prozac-->Effexor)  · Adderall 10 mg PO BID- taking PRN, using most days especially when working, uses average of 15mg daily    Interval History and Content of Current Session:  Pt emailed 2 week ago complaining of insomnia with excessive fatigue as a result. She noticed that one day after she forgot to take Effexor she slept much better and subsequently felt a lot better. She did get "brain zaps" from missing the pill eventually. Wanted to try lower dose of Effexor. New Rx for lower dose (75mg) sent.     TODAY, pt has not picked up new Rx, but instead has been emptying half of her capsules. Discussed XR formulation and using the caps appropriately. Will  lower dose.    She has been on the lower dose for about a week. She has had some "brain zaps," but getting better. Sleep has improved, and as a result her energy is getting a bit better. She was having to drink a ton of coffee to get through the day and could not feel her Adderall working before, and now that is starting to get better. Not quite where she wants to be yet. Mood generally the same. No serious depression, tearfulness, no SI. A bit sensitive but suspects it is related to the medicine transition. She tried CBD oil to help with the transition-related "brain zaps," and found they almost totally resolved. Discussed lack of data for CBT oil safety or efficacy. Pt urged to use judgement surrounding use. No panic or severe anxiety now. Kids out of school for the summer.     Adderall continues to be helpful for focus.  No other SEs from meds other than those discussed above. " "      Past med trials: Wellbutrin (helped mood/motivation, but caused her to pass out, have decrease appetite, weight loss), Paxil was very hard to come off of and does not remember how much it helped. Never tried SNRI. Prozac worked but was unsufficiently helpful    Review of Systems     · CONSTITUTIONAL: No weight gain or loss.   · MUSCULOSKELETAL: No pain or stiffness of the joints.  · NEUROLOGIC: No weakness, sensory changes, seizures, confusion, memory loss, tremor or other abnormal movements.  · ENDOCRINE: No polydipsia or polyuria.  · INTEGUMENTARY: No rashes or lacerations.  · ENT: No dizziness.  · RESPIRATORY: No shortness of breath.  · CARDIOVASCULAR: No tachycardia or chest pain.  · GASTROINTESTINAL: No nausea, vomiting, pain, constipation or diarrhea.  · GENITOURINARY: No frequency, dysuria or sexual dysfunction.   · PSYCHIATRIC: as noted in HPI    Psychiatric Review Of Systems - Is patient experiencing or having changes in:  Mood/depression: "ok so far"  interest/pleasure/anhedonia: good, stable, better  weight: no  sleep: recent insomnia, see HPI, improving  appetite: no problems  PMR/PMA: no  Energy/anergy/fatigue: no problem  Guilt/worthlessness: better  concentration: yes, but much better with Adderall  Hopelessness: no   SI: no   HI: no  hallucinations/delusions/paranoia: no   somatic symptoms: no  anxiety/panic: yes, in general a lot of worry/anxiety, but much worse lately, no panic,  Manic symptoms: no   S.I.B.s/risky behavior: no  any drugs: no  alcohol: yes, minimal      Past Medical, Family and Social History: The patient's past medical, family and social history have been reviewed and updated as appropriate within the electronic medical record - see encounter notes.  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 Has been through CBT but not recently. Med trials as above plus ambien per chart in 2015    Compliance: " "yes    Side effects: None    Risk Parameters:  Patient reports no suicidal ideation  Patient reports no homicidal ideation  Patient reports no self-injurious behavior  Patient reports no violent behavior    Exam (detailed: at least 9 elements; comprehensive: all 15 elements)   Constitutional  Vitals:  Most recent vital signs, dated less than 90 days prior to this appointment, were reviewed.   Vitals:    05/29/19 1627   BP: 114/66   Pulse: 91   Weight: 58.6 kg (129 lb 1.3 oz)   Height: 5' 1" (1.549 m)      General:  unremarkable, age appropriate, normal weight, well nourished, casually dressed     Musculoskeletal  Muscle Strength/Tone:  no dystonia, no tremor   Gait & Station:  non-ataxic     Psychiatric  Speech:  no latency; no press, spontaneous, loud volume, very talkative   Mood & Affect:  "ok so far"  congruent and appropriate, full, reactive, euthymic   Thought Process:  normal and logical, logical   Associations:  intact   Thought Content:  normal, no suicidality, no homicidality, delusions, or paranoia   Insight:  intact, has awareness of illness   Judgement: behavior is adequate to circumstances, age appropriate   Orientation:  grossly intact   Memory: intact for content of interview, able to remember recent events- yes, able to remember remote events- yes   Language: grossly intact   Attention Span & Concentration:  able to focus   Fund of Knowledge:  intact and appropriate to age and level of education     Assessment and Diagnosis   Status/Progress: Based on the examination today, the patient's problem(s) is/are well controlled.  New problems have not been presented today.   Co-morbidities, Diagnostic uncertainty and Lack of compliance are not complicating management of the primary condition.  There are no active rule-out diagnoses for this patient at this time.     General Impression: 38 y/o F with hx of depression, anxiety and ADHD who presents for follow up. Mood and anxiety improved and well controlled " on Effexor instead of prozac. Attention well controlled with Adderall as-is. Plan as below.       ICD-10-CM ICD-9-CM   1. Attention deficit hyperactivity disorder (ADHD), predominantly inattentive type F90.0 314.00   2. JAMEY (generalized anxiety disorder) F41.1 300.02   3. Recurrent major depressive disorder, in full remission F33.42 296.36     Intervention/Counseling/Treatment Plan     Depression & Anxiety  · Continue venlafaxine XR 75mg daily. Doing better on this than on Prozac so far, and better so far on lower dose than higer  ·  Previously discussed risks, benefits, AE's (incl serotonin syndrome) SE's (including but not limited to GI sx, insomnia, agitation/anxiety/activation, appetite changes, HA, hypotension, others) inherent unpredictability of illness and treatment options     ADHD, inattentive type  · Continue Adderall IR 10 mg PO BID. Provided 3 month-long prescriptions today.     Not currently in psychotherapy; does not feel needed at this time. Doing well.     Discussed diagnosis, risks and benefits of proposed treatment vs alternative treatments vs no treatment, and potential side effects of these treatments. The patient expresses understanding of the above and displays the capacity to agree with this treatment given said understanding. Patient also agrees that, currently, the benefits outweigh the risks and would like to pursue/continue treatment at this time. No plans to get pregnant at this time.     Return to Clinic: 2-3 nonths (pt going out of town for the summer, otherwise would advise 2 months. Can certainly schedule for sooner if desired and able). pt agrees to messsage with problems or concerns that arise before that. Discussed resident transition in July 2019. Pt to call for next appointment in first week of June.

## 2019-07-08 ENCOUNTER — OFFICE VISIT (OUTPATIENT)
Dept: PSYCHIATRY | Facility: CLINIC | Age: 39
End: 2019-07-08
Payer: COMMERCIAL

## 2019-07-08 VITALS
DIASTOLIC BLOOD PRESSURE: 71 MMHG | BODY MASS INDEX: 24.74 KG/M2 | WEIGHT: 130.94 LBS | HEART RATE: 101 BPM | SYSTOLIC BLOOD PRESSURE: 108 MMHG

## 2019-07-08 DIAGNOSIS — F33.41 RECURRENT MAJOR DEPRESSIVE DISORDER, IN PARTIAL REMISSION: Chronic | ICD-10-CM

## 2019-07-08 DIAGNOSIS — F90.0 ATTENTION DEFICIT HYPERACTIVITY DISORDER (ADHD), PREDOMINANTLY INATTENTIVE TYPE: Primary | Chronic | ICD-10-CM

## 2019-07-08 DIAGNOSIS — F41.9 ANXIETY: Chronic | ICD-10-CM

## 2019-07-08 PROCEDURE — 99999 PR PBB SHADOW E&M-EST. PATIENT-LVL II: CPT | Mod: PBBFAC,,, | Performed by: PSYCHIATRY & NEUROLOGY

## 2019-07-08 PROCEDURE — 99999 PR PBB SHADOW E&M-EST. PATIENT-LVL II: ICD-10-PCS | Mod: PBBFAC,,, | Performed by: PSYCHIATRY & NEUROLOGY

## 2019-07-08 PROCEDURE — 99212 PR OFFICE/OUTPT VISIT, EST, LEVL II, 10-19 MIN: ICD-10-PCS | Mod: S$GLB,,, | Performed by: PSYCHIATRY & NEUROLOGY

## 2019-07-08 PROCEDURE — 3008F PR BODY MASS INDEX (BMI) DOCUMENTED: ICD-10-PCS | Mod: CPTII,S$GLB,, | Performed by: PSYCHIATRY & NEUROLOGY

## 2019-07-08 PROCEDURE — 3008F BODY MASS INDEX DOCD: CPT | Mod: CPTII,S$GLB,, | Performed by: PSYCHIATRY & NEUROLOGY

## 2019-07-08 PROCEDURE — 99212 OFFICE O/P EST SF 10 MIN: CPT | Mod: S$GLB,,, | Performed by: PSYCHIATRY & NEUROLOGY

## 2019-07-08 RX ORDER — VENLAFAXINE HYDROCHLORIDE 37.5 MG/1
CAPSULE, EXTENDED RELEASE ORAL
Qty: 14 CAPSULE | Refills: 0 | Status: SHIPPED | OUTPATIENT
Start: 2019-07-08 | End: 2020-01-07

## 2019-07-08 RX ORDER — DEXTROAMPHETAMINE SACCHARATE, AMPHETAMINE ASPARTATE, DEXTROAMPHETAMINE SULFATE AND AMPHETAMINE SULFATE 2.5; 2.5; 2.5; 2.5 MG/1; MG/1; MG/1; MG/1
10 TABLET ORAL 2 TIMES DAILY
Qty: 60 TABLET | Refills: 0 | Status: SHIPPED | OUTPATIENT
Start: 2019-07-08 | End: 2019-09-23

## 2019-07-08 RX ORDER — DEXTROAMPHETAMINE SACCHARATE, AMPHETAMINE ASPARTATE, DEXTROAMPHETAMINE SULFATE AND AMPHETAMINE SULFATE 2.5; 2.5; 2.5; 2.5 MG/1; MG/1; MG/1; MG/1
10 TABLET ORAL 2 TIMES DAILY
Qty: 60 TABLET | Refills: 0 | Status: SHIPPED | OUTPATIENT
Start: 2019-08-08 | End: 2019-09-23

## 2019-07-08 RX ORDER — DEXTROAMPHETAMINE SACCHARATE, AMPHETAMINE ASPARTATE, DEXTROAMPHETAMINE SULFATE AND AMPHETAMINE SULFATE 2.5; 2.5; 2.5; 2.5 MG/1; MG/1; MG/1; MG/1
10 TABLET ORAL 2 TIMES DAILY
Qty: 60 TABLET | Refills: 0 | Status: SHIPPED | OUTPATIENT
Start: 2019-09-08 | End: 2019-12-24

## 2019-07-08 NOTE — PROGRESS NOTES
"Outpatient Psychiatry Follow-Up Visit (MD/NP)    7/8/2019    Clinical Status of Patient:  Outpatient (Ambulatory)    Chief Complaint:  Stormy Carlisle is a 39 y.o. female who presents today for follow-up of depression, anxiety and attention problems. Last seen 5/29/19 by Dr. Mitchell. Chart reviewed.  Met with patient.  Pt was continued on 75 mg effexor and 10 mg adderall BID at that time.     Current Psychiatric Medications:  · venlafaxine XR 75mg daily (cross tapered prozac-->Effexor)  · Adderall 10 mg PO BID- taking PRN, using most days especially when working    Interval History and Content of Current Session:    Today pt states that she is doing "fine". She states that she was switched to effexor but she "really wants to get off" of the medication because she does not find it helpful and she finds that it interferes with her sleep as she did not have sleep difficulties prior to starting the  Medication. She discusses how she attempted to self taper the medication by dumping out half of the granules and had "brain zaps" but when her psychiatrist called in the prescription for the lower dose she tolerated it well without any "brain zaps". She states that she would like to continue taper. She states that she has been medication compliant with the effexor 75 mg since it was called in, will decrease to 35.7 x 1 week and then discontinue. Pt is amenable to this plan. She goes on to state that she is not interested in starting a medication for depression or anxiety at this time as she feels that her anxiety and depression was situational and that the medication helped her "move through the process". She would like to see how her mood and anxiety are without medication and would not like to be restarted on anything at this time.   She describes her mood as  "fine in general", denies being depressed currently. Discussed partner conflict at length. She states that they have a couples therapist that they go to which they " "have found helpful and she has seen some changes but is not making the kind of progress she would like to see. Encouraged her to keep all appointments and continue working with therapist.     Adderall continues to be helpful for focus.  No other SEs from meds other than those discussed above.       Past med trials: Wellbutrin (helped mood/motivation, but caused her to pass out, have decrease appetite, weight loss), Paxil was very hard to come off of and does not remember how much it helped. Prozac worked but was unsufficiently helpful, venlafaxine (brain zaps and interfered with sleep)    Review of Systems     Pertinent items noted in HPI    Psychiatric Review Of Systems - Is patient experiencing or having changes in:  Mood/depression: "fine in general"  interest/pleasure/anhedonia: good, stable, better  weight: no  sleep: recent insomnia, see HPI, attributes to effexor  appetite: no problems  PMR/PMA: no  Energy/anergy/fatigue: no problem  Guilt/worthlessness: better  concentration: yes, but much better with Adderall  Hopelessness: no   SI: no   HI: no  hallucinations/delusions/paranoia: no   somatic symptoms: no  anxiety/panic: yes, in general a lot of worry/anxiety, but much worse lately, no panic,  Manic symptoms: no   S.I.B.s/risky behavior: no  any drugs: no  alcohol: yes, minimal      Past Medical, Family and Social History: The patient's past medical, family and social history have been reviewed and updated as appropriate within the electronic medical record - see encounter notes.  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 Has been through CBT but not recently. Med trials as above plus ambien per chart in 2015    Compliance: yes    Side effects: insomnia which she attributes to effexor    Risk Parameters:  Patient reports no suicidal ideation  Patient reports no homicidal ideation  Patient reports no self-injurious behavior  Patient " "reports no violent behavior    Exam (detailed: at least 9 elements; comprehensive: all 15 elements)   Constitutional  Vitals:  Most recent vital signs, dated less than 90 days prior to this appointment, were reviewed.   Vitals:    07/08/19 1400   BP: 108/71   Pulse: 101   Weight: 59.4 kg (130 lb 15.3 oz)      General:  unremarkable, age appropriate, normal weight, well nourished, casually dressed     Musculoskeletal  Muscle Strength/Tone:  no dystonia, no tremor   Gait & Station:  non-ataxic     Psychiatric  Speech:  no latency; no press, spontaneous, loud volume, very talkative   Mood & Affect:  "fine in general"  congruent and appropriate, full, reactive, euthymic   Thought Process:  normal and logical, logical   Associations:  intact   Thought Content:  normal, no suicidality, no homicidality, delusions, or paranoia   Insight:  intact, has awareness of illness   Judgement: behavior is adequate to circumstances, age appropriate   Orientation:  grossly intact   Memory: intact for content of interview, able to remember recent events- yes, able to remember remote events- yes   Language: grossly intact   Attention Span & Concentration:  able to focus   Fund of Knowledge:  intact and appropriate to age and level of education     Assessment and Diagnosis   Status/Progress: Based on the examination today, the patient's problem(s) is/are well controlled.  New problems have not been presented today.   Co-morbidities, Diagnostic uncertainty and Lack of compliance are not complicating management of the primary condition.  There are no active rule-out diagnoses for this patient at this time.     General Impression: 38 y/o F with hx of depression, anxiety and ADHD who presents for follow up. Mood and anxiety are stable, she is currently on effexor and does not find it helpful and finds it is causing insomnia and requests to taper off this medication and hold off on starting any medications for anxiety/mood.. Attention well " controlled with Adderall as-is. Plan as below.     No diagnosis found.  Intervention/Counseling/Treatment Plan     Depression & Anxiety  · Taper venlafaxine XR 75mg daily to 37.5 mg daily for one week than discontinue. She states she does not find it helpful and has led to insomnia which she has found disruptive. Will monitor sx after discontinuation and assess if pharmacotherapy is clinically indication  ·  Previously discussed risks, benefits, AE's (incl serotonin syndrome) SE's (including but not limited to GI sx, insomnia, agitation/anxiety/activation, appetite changes, HA, hypotension, others) inherent unpredictability of illness and treatment options     ADHD, inattentive type  · Continue Adderall IR 10 mg PO BID. Provided 3 month-long prescriptions today.     Not currently in psychotherapy but is in couple's therapy; recommended to continue as she finds it helpful    Discussed diagnosis, risks and benefits of proposed treatment vs alternative treatments vs no treatment, and potential side effects of these treatments. The patient expresses understanding of the above and displays the capacity to agree with this treatment given said understanding. Patient also agrees that, currently, the benefits outweigh the risks and would like to pursue/continue treatment at this time. No plans to get pregnant at this time.     Return to Clinic: 3 months-instructed to call if issues with medication or changes in mood/anxiety to make sooner appointment    Will discuss plan with attending, Dr. Schultz

## 2019-07-26 ENCOUNTER — TELEPHONE (OUTPATIENT)
Dept: PSYCHIATRY | Facility: CLINIC | Age: 39
End: 2019-07-26

## 2019-07-26 NOTE — TELEPHONE ENCOUNTER
"----- Message from Leatha Seth MA sent at 7/26/2019  1:13 PM CDT -----  Contact: Patient [phone]  Patient called because she is having a very hard time coming off medication venlafaxine (EFFEXOR-XR) 37.5 MG 24 hr capsule. She asked for a call back ASAP. She also asked if her medication dextroamphetamine-amphetamine 10 mg Tab could be upped to counter effect the withdrawals from the Effexor. Patient can be reached at [phone].    --------------------------------------------------------------------------------------------------------------------------------------------------------------------------------------------------------------------------------------    TELEPHONE CALL    Discussed with pt extensively regarding her symptoms of Effexor withdrawal including "fogginess", inability to sleep well, feeling overwhelmed. Pt sounds extremely anxious on the phone and when inquired about anxiety pt reports that it is not a problem. Discussed that pt has presented to this clinic since 2012 and she consistently been diagnosed with anxiety and depression. Informed pt that Increase Adderall is not recommended and may make the matters worse. Pt was displeased with this recommendation but is amenable to a more gently taper off Effexor by taking 37.5mg one day then 75mg the next day then 37.5mg and to alternate until her symptoms subside. If this doesn't help then discussed adding low dose Prozac to help with a more gentle withdrawal and then stopping the Prozac after a couple of weeks. Pt vehemently refuses being back on Prozac as it didn't help her and caused emotional blunting and sexual side effects. However her clinic notes in the past have shown that pt stating she has been doing better on the Prozac for couple of years.   Informed pt to call and make earlier appointment with  to further manage her medications.  Informed pt to present to ED for any worsening of psychiatric symptoms or any " SI/HI/AVH    ANGELIA LACEY MD   Department of Psychiatry   [facility]  7/26/2019 3:31 PM

## 2019-08-27 ENCOUNTER — OFFICE VISIT (OUTPATIENT)
Dept: PSYCHIATRY | Facility: CLINIC | Age: 39
End: 2019-08-27
Payer: COMMERCIAL

## 2019-08-27 VITALS
WEIGHT: 129.94 LBS | BODY MASS INDEX: 24.56 KG/M2 | SYSTOLIC BLOOD PRESSURE: 111 MMHG | DIASTOLIC BLOOD PRESSURE: 66 MMHG | HEART RATE: 78 BPM

## 2019-08-27 DIAGNOSIS — F33.41 RECURRENT MAJOR DEPRESSIVE DISORDER, IN PARTIAL REMISSION: Chronic | ICD-10-CM

## 2019-08-27 DIAGNOSIS — F90.0 ATTENTION DEFICIT HYPERACTIVITY DISORDER (ADHD), PREDOMINANTLY INATTENTIVE TYPE: Chronic | ICD-10-CM

## 2019-08-27 DIAGNOSIS — F41.9 ANXIETY: Primary | Chronic | ICD-10-CM

## 2019-08-27 PROCEDURE — 99212 PR OFFICE/OUTPT VISIT, EST, LEVL II, 10-19 MIN: ICD-10-PCS | Mod: S$GLB,,, | Performed by: PSYCHIATRY & NEUROLOGY

## 2019-08-27 PROCEDURE — 99999 PR PBB SHADOW E&M-EST. PATIENT-LVL II: CPT | Mod: PBBFAC,,, | Performed by: PSYCHIATRY & NEUROLOGY

## 2019-08-27 PROCEDURE — 3008F PR BODY MASS INDEX (BMI) DOCUMENTED: ICD-10-PCS | Mod: CPTII,S$GLB,, | Performed by: PSYCHIATRY & NEUROLOGY

## 2019-08-27 PROCEDURE — 99212 OFFICE O/P EST SF 10 MIN: CPT | Mod: S$GLB,,, | Performed by: PSYCHIATRY & NEUROLOGY

## 2019-08-27 PROCEDURE — 99999 PR PBB SHADOW E&M-EST. PATIENT-LVL II: ICD-10-PCS | Mod: PBBFAC,,, | Performed by: PSYCHIATRY & NEUROLOGY

## 2019-08-27 PROCEDURE — 3008F BODY MASS INDEX DOCD: CPT | Mod: CPTII,S$GLB,, | Performed by: PSYCHIATRY & NEUROLOGY

## 2019-08-27 RX ORDER — FLUOXETINE 10 MG/1
10 CAPSULE ORAL DAILY
Qty: 30 CAPSULE | Refills: 1 | Status: SHIPPED | OUTPATIENT
Start: 2019-08-27 | End: 2019-09-23

## 2019-08-27 NOTE — PROGRESS NOTES
"Outpatient Psychiatry Follow-Up Visit (MD/NP)    8/27/2019    Clinical Status of Patient:  Outpatient (Ambulatory)    Chief Complaint:  Stormy Carlisle is a 39 y.o. female who presents today for follow-up of depression, anxiety and attention problems. Last seen by me 7/08/19 ,  Pt was instructed to taper effexor and 10 mg adderall BID at that time.  reviewed, last adderall script filled 8/2/2019.    Current Psychiatric Medications:  · venlafaxine XR 75mg daily (cross tapered prozac-->Effexor)  · Adderall 10 mg PO BID- taking PRN, using most days especially when working    Interval History and Content of Current Session:  Pt states that she is a "mess" since last appointment and becomes tearful when discussing her sx a/w effexor withdrawal and discontinuation. She states that effexor has left to a weird taste in her mouth, Is making her feel poorly and that she having difficulty "keeping myself together". She states that she has been off of effexor for weeks and that the sx are gradally going away but she still does not feel well. She has been taking more adderall than as prescribed to help with her difficulty concentrating a/w effexor discontinuation and requests more. She recalls speaking with Dr. Schultz and is agreeable to low dose prozac.   She states that she has no appetite which she attributes top effexor withdrawal vs inappropriate use of adderall.  Discussed at length that there is no clinical indication for the use of adderall to treat SE of effexor withdrawal and discontinuation she becomes somewhat argumentative but understands that this is not accepted practice but expresses frustration of previous psych MD not thoroughly informing her of withdrawal and discontinuation sx. Does not endorse SI. No objective s/sx of jonny or psychosis          Past med trials: Wellbutrin (helped mood/motivation, but caused her to pass out, have decrease appetite, weight loss), Paxil was very hard to come off of and does " "not remember how much it helped. Prozac worked but was unsufficiently helpful, venlafaxine (brain zaps and interfered with sleep)    Review of Systems     Pertinent items noted in HPI      Past Medical, Family and Social History: The patient's past medical, family and social history have been reviewed and updated as appropriate within the electronic medical record - see encounter notes.  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 Has been through CBT but not recently. Med trials as above plus ambien per chart in 2015    Compliance: yes    Side effects: insomnia which she attributes to effexor    Risk Parameters:  Patient reports no suicidal ideation  Patient reports no homicidal ideation  Patient reports no self-injurious behavior  Patient reports no violent behavior    Exam (detailed: at least 9 elements; comprehensive: all 15 elements)   Constitutional  Vitals:  Most recent vital signs, dated less than 90 days prior to this appointment, were reviewed.   There were no vitals filed for this visit.   General:  unremarkable, age appropriate, normal weight, well nourished, casually dressed     Musculoskeletal  Muscle Strength/Tone:  no dystonia, no tremor   Gait & Station:  non-ataxic     Psychiatric  Speech:  no latency; no press, spontaneous, l very talkative   Mood & Affect:  "i'm a mess"  congruent and appropriate, full, reactive, appropriately and intermittently tearful when discussing difficulties   Thought Process:  normal and logical, logical   Associations:  intact   Thought Content:  normal, no suicidality, no homicidality, delusions, or paranoia   Insight:  intact, has awareness of illness   Judgement: behavior is adequate to circumstances, age appropriate   Orientation:  grossly intact   Memory: intact for content of interview, able to remember recent events- yes, able to remember remote events- yes   Language: grossly intact "   Attention Span & Concentration:  able to focus   Fund of Knowledge:  intact and appropriate to age and level of education     Assessment and Diagnosis   Status/Progress: Based on the examination today, the patient's problem(s) is/are not well controlled.  New problems have not been presented today.   Co-morbidities, Diagnostic uncertainty and Lack of compliance are not complicating management of the primary condition.  There are no active rule-out diagnoses for this patient at this time.     General Impression: 36 y/o F with hx of depression, anxiety and ADHD who presents for follow up. Pt having difficulty coming off of effexor and has been inappropriately taking adderall for sx. Discussed at length that appropriate tx for current sx is not stimulants but that can start low dose prozac, pt amenable    No diagnosis found.  Intervention/Counseling/Treatment Plan     Depression & Anxiety  Start prozac 10 mg to help with withdrawal sx  · Taper completed venlafaxine XR 75mg daily to 37.5 mg daily for one week than discontinue. She states she does not find it helpful and has led to insomnia which she has found disruptive. Will monitor sx after discontinuation and assess if pharmacotherapy is clinically indication  ·  Previously discussed risks, benefits, AE's (incl serotonin syndrome) SE's (including but not limited to GI sx, insomnia, agitation/anxiety/activation, appetite changes, HA, hypotension, others) inherent unpredictability of illness and treatment options  ·      ADHD, inattentive type  · Continue Adderall IR 10 mg PO BID. Pt has ran out of this month 2/2 inappropriate use and requests more to help her with withdrawal sx. Last filled 8/2/2019    Not currently in psychotherapy but is in couple's therapy; recommended to continue as she finds it helpful    Discussed diagnosis, risks and benefits of proposed treatment vs alternative treatments vs no treatment, and potential side effects of these treatments. The  patient expresses understanding of the above and displays the capacity to agree with this treatment given said understanding. Patient also agrees that, currently, the benefits outweigh the risks and would like to pursue/continue treatment at this time. No plans to get pregnant at this time.     Return to Clinic: 1 month    Will discuss plan with attending, Dr. Schultz

## 2019-08-28 ENCOUNTER — PATIENT MESSAGE (OUTPATIENT)
Dept: PSYCHIATRY | Facility: CLINIC | Age: 39
End: 2019-08-28

## 2019-09-23 ENCOUNTER — OFFICE VISIT (OUTPATIENT)
Dept: PSYCHIATRY | Facility: CLINIC | Age: 39
End: 2019-09-23
Payer: COMMERCIAL

## 2019-09-23 VITALS
DIASTOLIC BLOOD PRESSURE: 64 MMHG | SYSTOLIC BLOOD PRESSURE: 110 MMHG | HEART RATE: 81 BPM | HEIGHT: 61 IN | BODY MASS INDEX: 24.79 KG/M2 | WEIGHT: 131.31 LBS

## 2019-09-23 DIAGNOSIS — F90.0 ATTENTION DEFICIT HYPERACTIVITY DISORDER (ADHD), PREDOMINANTLY INATTENTIVE TYPE: Primary | Chronic | ICD-10-CM

## 2019-09-23 DIAGNOSIS — F33.1 MODERATE EPISODE OF RECURRENT MAJOR DEPRESSIVE DISORDER: Chronic | ICD-10-CM

## 2019-09-23 PROCEDURE — 3008F PR BODY MASS INDEX (BMI) DOCUMENTED: ICD-10-PCS | Mod: CPTII,S$GLB,, | Performed by: PSYCHIATRY & NEUROLOGY

## 2019-09-23 PROCEDURE — 99999 PR PBB SHADOW E&M-EST. PATIENT-LVL II: CPT | Mod: PBBFAC,,, | Performed by: PSYCHIATRY & NEUROLOGY

## 2019-09-23 PROCEDURE — 99999 PR PBB SHADOW E&M-EST. PATIENT-LVL II: ICD-10-PCS | Mod: PBBFAC,,, | Performed by: PSYCHIATRY & NEUROLOGY

## 2019-09-23 PROCEDURE — 3008F BODY MASS INDEX DOCD: CPT | Mod: CPTII,S$GLB,, | Performed by: PSYCHIATRY & NEUROLOGY

## 2019-09-23 PROCEDURE — 99212 PR OFFICE/OUTPT VISIT, EST, LEVL II, 10-19 MIN: ICD-10-PCS | Mod: S$GLB,,, | Performed by: PSYCHIATRY & NEUROLOGY

## 2019-09-23 PROCEDURE — 99212 OFFICE O/P EST SF 10 MIN: CPT | Mod: S$GLB,,, | Performed by: PSYCHIATRY & NEUROLOGY

## 2019-09-23 RX ORDER — DEXTROAMPHETAMINE SACCHARATE, AMPHETAMINE ASPARTATE, DEXTROAMPHETAMINE SULFATE AND AMPHETAMINE SULFATE 2.5; 2.5; 2.5; 2.5 MG/1; MG/1; MG/1; MG/1
10 TABLET ORAL 2 TIMES DAILY
Qty: 60 TABLET | Refills: 0 | Status: SHIPPED | OUTPATIENT
Start: 2019-10-03 | End: 2020-01-13 | Stop reason: SDUPTHER

## 2019-09-23 RX ORDER — DEXTROAMPHETAMINE SACCHARATE, AMPHETAMINE ASPARTATE, DEXTROAMPHETAMINE SULFATE AND AMPHETAMINE SULFATE 2.5; 2.5; 2.5; 2.5 MG/1; MG/1; MG/1; MG/1
10 TABLET ORAL 2 TIMES DAILY
Qty: 60 TABLET | Refills: 0 | Status: SHIPPED | OUTPATIENT
Start: 2019-12-03 | End: 2019-12-24

## 2019-09-23 RX ORDER — FLUOXETINE 10 MG/1
CAPSULE ORAL
Qty: 60 CAPSULE | Refills: 2 | Status: SHIPPED | OUTPATIENT
Start: 2019-09-23 | End: 2020-01-07 | Stop reason: SDUPTHER

## 2019-09-23 RX ORDER — DEXTROAMPHETAMINE SACCHARATE, AMPHETAMINE ASPARTATE, DEXTROAMPHETAMINE SULFATE AND AMPHETAMINE SULFATE 2.5; 2.5; 2.5; 2.5 MG/1; MG/1; MG/1; MG/1
10 TABLET ORAL 2 TIMES DAILY
Qty: 60 TABLET | Refills: 0 | Status: SHIPPED | OUTPATIENT
Start: 2019-11-03 | End: 2020-01-13 | Stop reason: SDUPTHER

## 2019-09-23 NOTE — PROGRESS NOTES
"Outpatient Psychiatry Follow-Up Visit (MD/NP)    9/23/2019    Clinical Status of Patient:  Outpatient (Ambulatory)    Chief Complaint:  Stormy Carlisle is a 39 y.o. female who presents today for follow-up of depression, anxiety and attention problems. Last seen by me 8/07/19. At that time pt reported ongoing withdrawal sx from effexor and 10 mg prozac was started to help with sx.  She was also continued on adderall 10 mg BID; pt had requested early refill as she felt it helped control her withdrawal sx, this request was declined as it is inappropriate use of medication .  reviewed, last adderall script filled 9/3/2019. She will need 3 month Rx today    Current Psychiatric Medications:  · prozac 10 mg daily to assist with discontinuation sx a/w effexor  · Adderall 10 mg PO BID- taking PRN, using most days especially when working    Interval History and Content of Current Session:  Pt states that she feels "better" and that "I am functional now". She has found the prozac helps but still feels "weird and 25% off". When asked to elaborate on this statement she reports that she has "patterns of negative thinking", and vague feelings of "hopelessness" , amotivation, anhedonia, decreased energy, and a decreased appetite but is improving (attributs this to strange taste in her mouth being gone). She reports feeling "a little bit" depressed but says that she feels trying to "reorient myself to feeling normal". Sleep is " much better", reports less irritability whiuch she attributes to withdrawal being over. Denies SI/HI. No objective s/sx of psychosis or jonny    States that she has attended a meditation class with a friend where they work on "inrense breathing" which she has found helpful. Improved relationship with , interested in CBT.    Says that she is interestted in going up on her adderall because she feelss like it would be helpful for focus and concentrate- "I know I am distracted by negative thoughts that " "are taking up energy"              Past med trials: Wellbutrin (helped mood/motivation, but caused her to pass out, have decrease appetite, weight loss), Paxil was very hard to come off of and does not remember how much it helped. Prozac worked but was unsufficiently helpful, venlafaxine (brain zaps and interfered with sleep)    Review of Systems     Pertinent items noted in HPI      Past Medical, Family and Social History: The patient's past medical, family and social history have been reviewed and updated as appropriate within the electronic medical record - see encounter notes.  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 Has been through CBT but not recently. Med trials as above plus ambien per chart in 2015    Compliance: yes    Side effects: insomnia which she attributes to effexor    Risk Parameters:  Patient reports no suicidal ideation  Patient reports no homicidal ideation  Patient reports no self-injurious behavior  Patient reports no violent behavior    Exam (detailed: at least 9 elements; comprehensive: all 15 elements)   Constitutional  Vitals:  Most recent vital signs, dated less than 90 days prior to this appointment, were reviewed.   Vitals:    09/23/19 1123   BP: 110/64   Pulse: 81   Weight: 59.5 kg (131 lb 4.5 oz)   Height: 5' 1" (1.549 m)      General:  unremarkable, age appropriate, normal weight, well nourished, casually dressed     Musculoskeletal  Muscle Strength/Tone:  no dystonia, no tremor   Gait & Station:  non-ataxic     Psychiatric  Speech:  no latency; no press, spontaneous,    Mood & Affect:  "better"  congruent and appropriate, full, reactive, somewhat anxious   Thought Process:  normal and logical, logical   Associations:  intact   Thought Content:  normal, no suicidality, no homicidality, delusions, or paranoia   Insight:  intact, has awareness of illness   Judgement: behavior is adequate to circumstances, age " appropriate   Orientation:  grossly intact   Memory: intact for content of interview, able to remember recent events- yes, able to remember remote events- yes   Language: grossly intact   Attention Span & Concentration:  able to focus   Fund of Knowledge:  intact and appropriate to age and level of education     Assessment and Diagnosis   Status/Progress: Based on the examination today, the patient's problem(s) is/are not well controlled.  New problems have not been presented today.   Co-morbidities, Diagnostic uncertainty and Lack of compliance are not complicating management of the primary condition.  There are no active rule-out diagnoses for this patient at this time.     General Impression: 38 y/o F with hx of depression, anxiety and ADHD who presents for follow up. Pt was tapered off of effexor and was started on  10 mg prozac to help with withdrawal which has been heffective but reports recurrence of depressed mood associated depressive sx. Will increase to 20 mg prozac and continue adderall at currant dose.    No diagnosis found.  Intervention/Counseling/Treatment Plan     Depression & Anxiety  Increase prozac 20 mg to help with depressive sx  · Tapered off of effexor and started 10 mg prozac to help with withdrawal sx, has been helpful but now recurrence of depressive sx.  ·  Previously discussed risks, benefits, AE's (incl serotonin syndrome) SE's (including but not limited to GI sx, insomnia, agitation/anxiety/activation, appetite changes, HA, hypotension, others) inherent unpredictability of illness and treatment options  · Provided patient with phone number to make appointment as she would likely benefit from CBT     ADHD, inattentive type  · Continue Adderall IR 10 mg PO BID. Provided 3 months worth of Rx today.    Not currently in psychotherapy but is in couple's therapy; recommended to continue as she finds it helpful    Discussed diagnosis, risks and benefits of proposed treatment vs alternative  treatments vs no treatment, and potential side effects of these treatments. The patient expresses understanding of the above and displays the capacity to agree with this treatment given said understanding. Patient also agrees that, currently, the benefits outweigh the risks and would like to pursue/continue treatment at this time. No plans to get pregnant at this time.     Return to Clinic: 3 months or sooner if needed    Will discuss plan with attending, Dr. Marion Sutherland MD  [facility] PGY3

## 2019-09-23 NOTE — PATIENT INSTRUCTIONS
To set up an appointment with a therapist, please call the [facility] department at [phone]. Let them know that you are an established patient at [facility] and that your psychiatrist is Dr. Sutherland. Wait times for appointments can be long, so please call as soon as you are able!

## 2019-12-24 RX ORDER — DEXTROAMPHETAMINE SACCHARATE, AMPHETAMINE ASPARTATE, DEXTROAMPHETAMINE SULFATE AND AMPHETAMINE SULFATE 2.5; 2.5; 2.5; 2.5 MG/1; MG/1; MG/1; MG/1
10 TABLET ORAL 2 TIMES DAILY
Qty: 60 TABLET | Refills: 0 | Status: SHIPPED | OUTPATIENT
Start: 2019-12-24 | End: 2020-01-13 | Stop reason: SDUPTHER

## 2019-12-24 NOTE — TELEPHONE ENCOUNTER
Requested Prescriptions     Pending Prescriptions Disp Refills    dextroamphetamine-amphetamine (ADDERALL) 10 mg Tab 60 tablet 0     Sig: Take 1 tablet (10 mg total) by mouth 2 (two) times daily.     Covering for Dr. Sutherland. Will refill the above medication request for 1 month only. Checked [place]  and no irregularities were noted. Last fill was 11/11/19    ANGELIA LACEY MD   Department of Psychiatry   [facility]  12/24/2019 4:14 PM

## 2020-01-07 RX ORDER — FLUOXETINE 10 MG/1
CAPSULE ORAL
Qty: 60 CAPSULE | Refills: 2 | Status: SHIPPED | OUTPATIENT
Start: 2020-01-07 | End: 2020-04-06 | Stop reason: SDUPTHER

## 2020-01-13 ENCOUNTER — OFFICE VISIT (OUTPATIENT)
Dept: PSYCHIATRY | Facility: CLINIC | Age: 40
End: 2020-01-13
Payer: COMMERCIAL

## 2020-01-13 VITALS
BODY MASS INDEX: 25.01 KG/M2 | SYSTOLIC BLOOD PRESSURE: 108 MMHG | DIASTOLIC BLOOD PRESSURE: 62 MMHG | WEIGHT: 132.38 LBS | HEART RATE: 79 BPM

## 2020-01-13 DIAGNOSIS — F90.0 ATTENTION DEFICIT HYPERACTIVITY DISORDER (ADHD), PREDOMINANTLY INATTENTIVE TYPE: Primary | Chronic | ICD-10-CM

## 2020-01-13 DIAGNOSIS — F33.1 MODERATE EPISODE OF RECURRENT MAJOR DEPRESSIVE DISORDER: Chronic | ICD-10-CM

## 2020-01-13 PROCEDURE — 99212 OFFICE O/P EST SF 10 MIN: CPT | Mod: S$GLB,,, | Performed by: PSYCHIATRY & NEUROLOGY

## 2020-01-13 PROCEDURE — 3008F BODY MASS INDEX DOCD: CPT | Mod: CPTII,S$GLB,, | Performed by: PSYCHIATRY & NEUROLOGY

## 2020-01-13 PROCEDURE — 99999 PR PBB SHADOW E&M-EST. PATIENT-LVL II: ICD-10-PCS | Mod: PBBFAC,,, | Performed by: PSYCHIATRY & NEUROLOGY

## 2020-01-13 PROCEDURE — 99212 PR OFFICE/OUTPT VISIT, EST, LEVL II, 10-19 MIN: ICD-10-PCS | Mod: S$GLB,,, | Performed by: PSYCHIATRY & NEUROLOGY

## 2020-01-13 PROCEDURE — 3008F PR BODY MASS INDEX (BMI) DOCUMENTED: ICD-10-PCS | Mod: CPTII,S$GLB,, | Performed by: PSYCHIATRY & NEUROLOGY

## 2020-01-13 PROCEDURE — 99999 PR PBB SHADOW E&M-EST. PATIENT-LVL II: CPT | Mod: PBBFAC,,, | Performed by: PSYCHIATRY & NEUROLOGY

## 2020-01-13 RX ORDER — DEXTROAMPHETAMINE SACCHARATE, AMPHETAMINE ASPARTATE, DEXTROAMPHETAMINE SULFATE AND AMPHETAMINE SULFATE 2.5; 2.5; 2.5; 2.5 MG/1; MG/1; MG/1; MG/1
10 TABLET ORAL 2 TIMES DAILY
Qty: 60 TABLET | Refills: 0 | Status: SHIPPED | OUTPATIENT
Start: 2020-03-24 | End: 2020-06-01 | Stop reason: SDUPTHER

## 2020-01-13 RX ORDER — DEXTROAMPHETAMINE SACCHARATE, AMPHETAMINE ASPARTATE, DEXTROAMPHETAMINE SULFATE AND AMPHETAMINE SULFATE 2.5; 2.5; 2.5; 2.5 MG/1; MG/1; MG/1; MG/1
10 TABLET ORAL 2 TIMES DAILY
Qty: 60 TABLET | Refills: 0 | Status: SHIPPED | OUTPATIENT
Start: 2020-01-24 | End: 2020-04-08 | Stop reason: SDUPTHER

## 2020-01-13 RX ORDER — DEXTROAMPHETAMINE SACCHARATE, AMPHETAMINE ASPARTATE, DEXTROAMPHETAMINE SULFATE AND AMPHETAMINE SULFATE 2.5; 2.5; 2.5; 2.5 MG/1; MG/1; MG/1; MG/1
10 TABLET ORAL 2 TIMES DAILY
Qty: 60 TABLET | Refills: 0 | Status: SHIPPED | OUTPATIENT
Start: 2020-02-24 | End: 2020-06-01 | Stop reason: SDUPTHER

## 2020-01-13 NOTE — PROGRESS NOTES
"Outpatient Psychiatry Follow-Up Visit (MD/NP)    1/13/2020    Clinical Status of Patient:  Outpatient (Ambulatory)    Chief Complaint:  Stormy Carlisle is a 39 y.o. female who presents today for follow-up of depression, anxiety and attention problems. Last seen by me 9/23/19. At that time pt reported improvement in withdrawal sx from effexor but had recurrance of depressive sx and increased to 20 mg prozac & was continued on adderall 10 mg BID for ADHD.  reviewed, last adderall script filled 12/24/2019.     Current Psychiatric Medications:  · prozac 20 mg daily for mood  · Adderall 10 mg PO BID- taking PRN, using most days especially when working (1/24, 2/24, 3/24)    Interval History and Content of Current Session:    Pt states that she has been doing well sice her last appointment and says that she "feels back to normal" as she feels that her effexor withdrawal has completely resolved.  Pt states that she feels that the adderall and increased prozac dose have been working well. No issues with attention, concentration, focus.She has also been attending yoga sessions which she founds has been an effective way to cope with stress.  She describes her mood as "fine". Denies feeling depressed but says sometimes she experiences anxiety related to "family stuff" and "expectations with the kids" around the holidays. Pt spends a large part of appointment discussing conflict related to , in laws, and her parents. Energy is "great". No issues with appetite or sleep.Denies anhedonia (enjoys engaging in art), hopelessness. Denies HI/SI. No objective s/sx of psychosis or jonny.        Past med trials: Wellbutrin (helped mood/motivation, but caused her to pass out, have decrease appetite, weight loss), Paxil was very hard to come off of and does not remember how much it helped. Prozac worked but was unsufficiently helpful, venlafaxine (brain zaps and interfered with sleep)    Review of Systems     MEDICAL REVIEW OF " "SYSTEMS  History obtained from the patient   General : NO chills or fever   Eyes: NO  visual changes   ENT: NO , nasal discharge or sore throat   Endocrine: NO weight changes   Dermatological: NO rashes   Respiratory: NO cough, shortness of breath   Cardiovascular: NO chest pain, palpitations or racing heart   Gastrointestinal: NO nausea, vomiting, constipation or diarrhea   Musculoskeletal: NO muscle pain or stiffness   Neurological: NO , dizziness, headaches or tremors   Psychiatric: please see HPI        Past Medical, Family and Social History: The patient's past medical, family and social history have been reviewed and updated as appropriate within the electronic medical record - see encounter notes.  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 Has been through CBT but not recently. Med trials as above plus ambien per chart in 2015    Compliance: yes    Side effects: insomnia which she attributes to effexor    Risk Parameters:  Patient reports no suicidal ideation  Patient reports no homicidal ideation  Patient reports no self-injurious behavior  Patient reports no violent behavior    Exam (detailed: at least 9 elements; comprehensive: all 15 elements)   Constitutional  Vitals:  Most recent vital signs, dated less than 90 days prior to this appointment, were reviewed.   Vitals:    01/13/20 1128   BP: 108/62   Pulse: 79   Weight: 60.1 kg (132 lb 6.2 oz)      General:  unremarkable, age appropriate, normal weight, well nourished, casually dressed     Musculoskeletal  Muscle Strength/Tone:  no dystonia, no tremor   Gait & Station:  non-ataxic     Psychiatric  Speech:  no latency; no press, spontaneous,    Mood & Affect:  "fine"  congruent and appropriate, full, reactive, somewhat anxious   Thought Process:  normal and logical, logical   Associations:  intact   Thought Content:  normal, no suicidality, no homicidality, delusions, or " paranoia   Insight:  intact, has awareness of illness   Judgement: behavior is adequate to circumstances, age appropriate   Orientation:  grossly intact   Memory: intact for content of interview, able to remember recent events- yes, able to remember remote events- yes   Language: grossly intact   Attention Span & Concentration:  able to focus   Fund of Knowledge:  intact and appropriate to age and level of education     Assessment and Diagnosis   Status/Progress: Based on the examination today, the patient's problem(s) is/are not well controlled.  New problems have not been presented today.   Co-morbidities, Diagnostic uncertainty and Lack of compliance are not complicating management of the primary condition.  There are no active rule-out diagnoses for this patient at this time.     General Impression: 36 y/o F with hx of depression, anxiety and ADHD who presents for follow up. Pt was tapered off of effexor and was started on  10 mg prozac to help with withdrawal which has been effective but reports recurrence of depressed mood associated depressive sx and dose was increased to 20 mg. Currently depressive sx and anxiety are well controlled. Will continue current medications.      ICD-10-CM ICD-9-CM   1. Attention deficit hyperactivity disorder (ADHD), predominantly inattentive type F90.0 314.00   2. Moderate episode of recurrent major depressive disorder F33.1 296.32     Intervention/Counseling/Treatment Plan     Depression & Anxiety  Continue prozac 20 mg to help with depressive sx  · Tapered off of effexor and started 10 mg prozac to help with withdrawal sx, has been helpful but now recurrence of depressive sx.  ·  Previously discussed risks, benefits, AE's (incl serotonin syndrome) SE's (including but not limited to GI sx, insomnia, agitation/anxiety/activation, appetite changes, HA, hypotension, others) inherent unpredictability of illness and treatment options  · Provided patient with phone number to make  appointment as she would likely benefit from CBT     ADHD, inattentive type  · Continue Adderall IR 10 mg PO BID. Provided 3 months worth of Rx today    Not currently in psychotherapy but is in couple's therapy; recommended to continue as she finds it helpful    Discussed diagnosis, risks and benefits of proposed treatment vs alternative treatments vs no treatment, and potential side effects of these treatments. The patient expresses understanding of the above and displays the capacity to agree with this treatment given said understanding. Patient also agrees that, currently, the benefits outweigh the risks and would like to pursue/continue treatment at this time. No plans to get pregnant at this time.     Return to Clinic: 3 months or sooner if needed    Will discuss plan with attending, Dr. Marion Sutherland MD  [facility] PGY3

## 2020-04-06 ENCOUNTER — TELEPHONE (OUTPATIENT)
Dept: PSYCHIATRY | Facility: CLINIC | Age: 40
End: 2020-04-06

## 2020-04-06 ENCOUNTER — PATIENT MESSAGE (OUTPATIENT)
Dept: PSYCHIATRY | Facility: CLINIC | Age: 40
End: 2020-04-06

## 2020-04-06 RX ORDER — FLUOXETINE 10 MG/1
CAPSULE ORAL
Qty: 60 CAPSULE | Refills: 0 | Status: SHIPPED | OUTPATIENT
Start: 2020-04-06 | End: 2020-05-15 | Stop reason: SDUPTHER

## 2020-04-09 ENCOUNTER — PATIENT MESSAGE (OUTPATIENT)
Dept: PSYCHIATRY | Facility: CLINIC | Age: 40
End: 2020-04-09

## 2020-05-14 ENCOUNTER — PATIENT MESSAGE (OUTPATIENT)
Dept: PSYCHIATRY | Facility: CLINIC | Age: 40
End: 2020-05-14

## 2020-05-28 ENCOUNTER — PATIENT MESSAGE (OUTPATIENT)
Dept: PSYCHIATRY | Facility: CLINIC | Age: 40
End: 2020-05-28

## 2020-06-01 ENCOUNTER — OFFICE VISIT (OUTPATIENT)
Dept: PSYCHIATRY | Facility: CLINIC | Age: 40
End: 2020-06-01
Payer: COMMERCIAL

## 2020-06-01 DIAGNOSIS — F90.0 ATTENTION DEFICIT HYPERACTIVITY DISORDER (ADHD), PREDOMINANTLY INATTENTIVE TYPE: Primary | Chronic | ICD-10-CM

## 2020-06-01 DIAGNOSIS — F41.9 ANXIETY: Chronic | ICD-10-CM

## 2020-06-01 DIAGNOSIS — F33.1 MODERATE EPISODE OF RECURRENT MAJOR DEPRESSIVE DISORDER: Chronic | ICD-10-CM

## 2020-06-01 PROCEDURE — 99212 PR OFFICE/OUTPT VISIT, EST, LEVL II, 10-19 MIN: ICD-10-PCS | Mod: 95,,, | Performed by: PSYCHIATRY & NEUROLOGY

## 2020-06-01 PROCEDURE — 99212 OFFICE O/P EST SF 10 MIN: CPT | Mod: 95,,, | Performed by: PSYCHIATRY & NEUROLOGY

## 2020-06-01 RX ORDER — DEXTROAMPHETAMINE SACCHARATE, AMPHETAMINE ASPARTATE, DEXTROAMPHETAMINE SULFATE AND AMPHETAMINE SULFATE 2.5; 2.5; 2.5; 2.5 MG/1; MG/1; MG/1; MG/1
10 TABLET ORAL 2 TIMES DAILY
Qty: 60 TABLET | Refills: 0 | Status: SHIPPED | OUTPATIENT
Start: 2020-06-18 | End: 2020-09-03 | Stop reason: SDUPTHER

## 2020-06-01 RX ORDER — HYDROXYZINE PAMOATE 25 MG/1
25 CAPSULE ORAL 2 TIMES DAILY PRN
Qty: 60 CAPSULE | Refills: 0 | Status: SHIPPED | OUTPATIENT
Start: 2020-06-01 | End: 2020-07-01

## 2020-06-01 RX ORDER — DEXTROAMPHETAMINE SACCHARATE, AMPHETAMINE ASPARTATE, DEXTROAMPHETAMINE SULFATE AND AMPHETAMINE SULFATE 2.5; 2.5; 2.5; 2.5 MG/1; MG/1; MG/1; MG/1
10 TABLET ORAL 2 TIMES DAILY
Qty: 60 TABLET | Refills: 0 | Status: SHIPPED | OUTPATIENT
Start: 2020-08-18 | End: 2020-09-03 | Stop reason: SDUPTHER

## 2020-06-01 RX ORDER — FLUOXETINE HYDROCHLORIDE 20 MG/1
CAPSULE ORAL
Qty: 30 CAPSULE | Refills: 2 | Status: SHIPPED | OUTPATIENT
Start: 2020-06-01 | End: 2020-09-03 | Stop reason: SDUPTHER

## 2020-06-01 RX ORDER — DEXTROAMPHETAMINE SACCHARATE, AMPHETAMINE ASPARTATE, DEXTROAMPHETAMINE SULFATE AND AMPHETAMINE SULFATE 2.5; 2.5; 2.5; 2.5 MG/1; MG/1; MG/1; MG/1
10 TABLET ORAL 2 TIMES DAILY
Qty: 60 TABLET | Refills: 0 | Status: SHIPPED | OUTPATIENT
Start: 2020-07-18 | End: 2020-09-03 | Stop reason: SDUPTHER

## 2020-06-01 NOTE — PROGRESS NOTES
"Outpatient Psychiatry Follow-Up Visit (MD/NP)    6/1/2020          TELEPHONE VISIT  Disclaimer   This is a telephonic visit which was performed without the use of video technology due to patient inability to connect with video. The patient was informed despite using HIPPA compliant technology there may be risks including security breach, technological failure, inability to perform a comprehensive physical exam which could delay or prevent an accurate diagnosis, and potential complications from treatment decisions rendered over a telemedical platform. The patient understands and consented to the use of tele-health service as being a safe measure to mitigate during COVID Crisis.  The patient was also informed of the relationship between the physician and patient and the respective role of any other health care provider with respect to management of the patient; and notified that the pt may decline to receive medical services by telemedicine and may withdraw from such care at any time.     Patient's Current location, exact physical address: [address] [place] [address]  In Case of Emergency pts next of kin:  Name:  marc tyson  Phone number:  [phone]  Visit type: Audio only   (unable to perform Virtual visit with synchronous audio  Total time spent with patient: 20 minutes        Clinical Status of Patient:  Outpatient (Ambulatory)    Chief Complaint:  Stormy Tyson is a 40 y.o. female who presents today for follow-up of depression, anxiety and attention problems. Last seen by me 1/13/2020. At that time continued 20 mg prozac & was continued on adderall 10 mg BID for ADHD.  reviewed, last adderall script filled 5/18/2020.     Current Psychiatric Medications:  · prozac 20 mg daily for mood  · Adderall 10 mg PO BID- taking PRN, using most days especially when working (last filled 5/18)    Interval History and Content of Current Session:    Pt states that she has been doing fine "all things considered". Has been " "doing socially distancing porch visits.   Overall describes mood as "fine", rates mood  4-5/10 (10 being the best). Current stressors apart from covid is youngest daughter is getting eye surgery in a couple weeks and she is experiencing some increased anxiety related to this. Requests xanax; discussed option of vistaril as it is not habit forming; amenable to trial.Sleep is "a little worse than usual" but attributes to covid 19.Adderall continues to be helpful for concentration, attention, focus. Denies SE including jitteriness, CP, palpitations. Denies SI/HI. Denies hopelessness.No objective ssx of jonny or psychosis. Spent majority of visit discussing recent stressors.      Past med trials: Wellbutrin (helped mood/motivation, but caused her to pass out, have decrease appetite, weight loss), Paxil was very hard to come off of and does not remember how much it helped. Prozac worked but was unsufficiently helpful, venlafaxine (brain zaps and interfered with sleep)    Review of Systems     MEDICAL REVIEW OF SYSTEMS  History obtained from the patient   General : NO chills or fever   Eyes: NO  visual changes   ENT: NO  nasal discharge or sore throat   Endocrine: NO weight changes   Dermatological: NO rashes   Respiratory: NO cough, shortness of breath   Cardiovascular: NO chest pain, palpitations or racing heart   Gastrointestinal: NO nausea, vomiting, constipation or diarrhea   Musculoskeletal: NO muscle pain or stiffness   Neurological: NO , dizziness, headaches or tremors   Psychiatric: please see HPI        Past Medical, Family and Social History: The patient's past medical, family and social history have been reviewed and updated as appropriate within the electronic medical record - see encounter notes.  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 Has been through CBT but not recently. Med trials as above plus ambien per chart in " "2015    Compliance: yes    Side effects: insomnia which she attributes to effexor    Risk Parameters:  Patient reports no suicidal ideation  Patient reports no homicidal ideation  Patient reports no self-injurious behavior  Patient reports no violent behavior    Exam (detailed: at least 9 elements; comprehensive: all 15 elements)   Constitutional  Vitals:  Most recent vital signs, dated greater than 90 days prior to this appointment, were reviewed.   There were no vitals filed for this visit.   General:  LYNETTE over phone     Musculoskeletal  Muscle Strength/Tone:  LYNETTE over phone   Gait & Station:  LYNETTE over phone     Psychiatric  Speech:  no latency; no press, spontaneous,    Mood & Affect:  "4-5/10"  lynette over phone,    Thought Process:  normal and logical, logical   Associations:  intact   Thought Content:  normal, no suicidality, no homicidality, delusions, or paranoia   Insight:  intact, has awareness of illness   Judgement: behavior is adequate to circumstances, age appropriate   Orientation:  grossly intact   Memory: intact for content of interview, able to remember recent events- yes, able to remember remote events- yes   Language: grossly intact   Attention Span & Concentration:  able to focus   Fund of Knowledge:  intact and appropriate to age and level of education     Assessment and Diagnosis   Status/Progress: Based on the examination today, the patient's problem(s) is/are not well controlled.  New problems have not been presented today.   Co-morbidities, Diagnostic uncertainty and Lack of compliance are not complicating management of the primary condition.  There are no active rule-out diagnoses for this patient at this time.     General Impression: 38 y/o F with hx of depression, anxiety and ADHD who presents for follow up. Pt was tapered off of effexor and was started on  10 mg prozac to help with withdrawal which has been effective but reports recurrence of depressed mood associated depressive sx and " dose was increased to 20 mg. Currently depressive sx and anxiety are over all well controlled. Will continue current medications.    No diagnosis found.  Intervention/Counseling/Treatment Plan     Depression & Anxiety  Continue prozac 20 mg to help with depressive sx  Start vistaril 25 mg prn for anxiety  · Tapered off of effexor and started 10 mg prozac to help with withdrawal sx, has been helpful but now recurrence of depressive sx.  ·  Previously discussed risks, benefits, AE's (incl serotonin syndrome) SE's (including but not limited to GI sx, insomnia, agitation/anxiety/activation, appetite changes, HA, hypotension, others) inherent unpredictability of illness and treatment options  · Provided patient with phone number to make appointment as she would likely benefit from CBT       ADHD, inattentive type  · Continue Adderall IR 10 mg PO BID. Last filled 5/18. Provided electronic refills for 6/18, 7/18, 8/18 today.      Not currently in psychotherapy but is in couple's therapy; recommended to continue as she finds it helpful    Discussed diagnosis, risks and benefits of proposed treatment vs alternative treatments vs no treatment, and potential side effects of these treatments. The patient expresses understanding of the above and displays the capacity to agree with this treatment given said understanding. Patient also agrees that, currently, the benefits outweigh the risks and would like to pursue/continue treatment at this time. No plans to get pregnant at this time.     Return to Clinic: 3 months or sooner if needed    Will discuss plan with attending, Dr. Marion Sutherland MD  [facility] PGY3

## 2020-09-03 ENCOUNTER — OFFICE VISIT (OUTPATIENT)
Dept: PSYCHIATRY | Facility: CLINIC | Age: 40
End: 2020-09-03
Payer: COMMERCIAL

## 2020-09-03 DIAGNOSIS — F90.0 ATTENTION DEFICIT HYPERACTIVITY DISORDER (ADHD), PREDOMINANTLY INATTENTIVE TYPE: Primary | ICD-10-CM

## 2020-09-03 DIAGNOSIS — F41.1 GAD (GENERALIZED ANXIETY DISORDER): ICD-10-CM

## 2020-09-03 DIAGNOSIS — F33.1 MODERATE EPISODE OF RECURRENT MAJOR DEPRESSIVE DISORDER: ICD-10-CM

## 2020-09-03 PROCEDURE — 99214 PR OFFICE/OUTPT VISIT, EST, LEVL IV, 30-39 MIN: ICD-10-PCS | Mod: 95,,, | Performed by: NURSE PRACTITIONER

## 2020-09-03 PROCEDURE — 99214 OFFICE O/P EST MOD 30 MIN: CPT | Mod: 95,,, | Performed by: NURSE PRACTITIONER

## 2020-09-03 RX ORDER — DEXTROAMPHETAMINE SACCHARATE, AMPHETAMINE ASPARTATE, DEXTROAMPHETAMINE SULFATE AND AMPHETAMINE SULFATE 2.5; 2.5; 2.5; 2.5 MG/1; MG/1; MG/1; MG/1
10 TABLET ORAL 2 TIMES DAILY
Qty: 60 TABLET | Refills: 0 | Status: SHIPPED | OUTPATIENT
Start: 2020-09-03 | End: 2020-10-03

## 2020-09-03 RX ORDER — DEXTROAMPHETAMINE SACCHARATE, AMPHETAMINE ASPARTATE, DEXTROAMPHETAMINE SULFATE AND AMPHETAMINE SULFATE 2.5; 2.5; 2.5; 2.5 MG/1; MG/1; MG/1; MG/1
10 TABLET ORAL 2 TIMES DAILY
Qty: 60 TABLET | Refills: 0 | Status: SHIPPED | OUTPATIENT
Start: 2020-10-31 | End: 2020-12-23 | Stop reason: SDUPTHER

## 2020-09-03 RX ORDER — FLUOXETINE HYDROCHLORIDE 20 MG/1
CAPSULE ORAL
Qty: 30 CAPSULE | Refills: 2 | Status: SHIPPED | OUTPATIENT
Start: 2020-09-03 | End: 2020-11-03

## 2020-09-03 RX ORDER — DEXTROAMPHETAMINE SACCHARATE, AMPHETAMINE ASPARTATE, DEXTROAMPHETAMINE SULFATE AND AMPHETAMINE SULFATE 2.5; 2.5; 2.5; 2.5 MG/1; MG/1; MG/1; MG/1
10 TABLET ORAL 2 TIMES DAILY
Qty: 60 TABLET | Refills: 0 | Status: SHIPPED | OUTPATIENT
Start: 2020-10-01 | End: 2020-10-31

## 2020-09-03 NOTE — PATIENT INSTRUCTIONS
Fluoxetine capsules or tablets (Depression/Mood Disorders)  What is this medicine?  FLUOXETINE (floo OX e teen) belongs to a class of drugs known as selective serotonin reuptake inhibitors (SSRIs). It helps to treat mood problems such as depression, obsessive compulsive disorder, and panic attacks. It can also treat certain eating disorders.  How should I use this medicine?  Take this medicine by mouth with a glass of water. Follow the directions on the prescription label. You can take this medicine with or without food. Take your medicine at regular intervals. Do not take it more often than directed. Do not stop taking this medicine suddenly except upon the advice of your doctor. Stopping this medicine too quickly may cause serious side effects or your condition may worsen.  A special MedGuide will be given to you by the pharmacist with each prescription and refill. Be sure to read this information carefully each time.  Talk to your pediatrician regarding the use of this medicine in children. While this drug may be prescribed for children as young as 7 years for selected conditions, precautions do apply.  What side effects may I notice from receiving this medicine?  Side effects that you should report to your doctor or health care professional as soon as possible:  · allergic reactions like skin rash, itching or hives, swelling of the face, lips, or tongue  · breathing problems  · confusion  · eye pain, changes in vision  · fast or irregular heart rate, palpitations  · flu-like fever, chills, cough, muscle or joint aches and pains  · seizures  · suicidal thoughts or other mood changes  · swelling or redness in or around the eye  · tremors  · trouble sleeping  · unusual bleeding or bruising  · unusually tired or weak  · vomiting  Side effects that usually do not require medical attention (report to your doctor or health care professional if they continue or are bothersome):  · change in sex drive or  performance  · diarrhea  · dry mouth  · flushing  · headache  · increased or decreased appetite  · nausea  · sweating  What may interact with this medicine?  Do not take fluoxetine with any of the following medications:  · other medicines containing fluoxetine, like Sarafem or Symbyax  · cisapride  · linezolid  · MAOIs like Carbex, Eldepryl, Marplan, Nardil, and Parnate  · methylene blue (injected into a vein)  · pimozide  · thioridazine  This medicine may also interact with the following medications:  · alcohol  · aspirin and aspirin-like medicines  · carbamazepine  · certain medicines for depression, anxiety, or psychotic disturbances  · certain medicines for migraine headaches like almotriptan, eletriptan, frovatriptan, naratriptan, rizatriptan, sumatriptan, zolmitriptan  · digoxin  · diuretics  · fentanyl  · flecainide  · furazolidone  · isoniazid  · lithium  · medicines for sleep  · medicines that treat or prevent blood clots like warfarin, enoxaparin, and dalteparin  · NSAIDs, medicines for pain and inflammation, like ibuprofen or naproxen  · phenytoin  · procarbazine  · propafenone  · rasagiline  · ritonavir  · supplements like [place]'s wort, kava kava, valerian  · tramadol  · tryptophan  · vinblastine  What if I miss a dose?  If you miss a dose, skip the missed dose and go back to your regular dosing schedule. Do not take double or extra doses.  Where should I keep my medicine?  Keep out of the reach of children.  Store at room temperature between 15 and 30 degrees C (59 and 86 degrees F). Throw away any unused medicine after the expiration date.  What should I tell my health care provider before I take this medicine?  They need to know if you have any of these conditions:  · bipolar disorder or jonny  · diabetes  · glaucoma  · liver disease  · psychosis  · seizures  · suicidal thoughts or history of attempted suicide  · an unusual or allergic reaction to fluoxetine, other medicines, foods, dyes, or  preservatives  · pregnant or trying to get pregnant  · breast-feeding  What should I watch for while using this medicine?  Tell your doctor if your symptoms do not get better or if they get worse. Visit your doctor or health care professional for regular checks on your progress. Because it may take several weeks to see the full effects of this medicine, it is important to continue your treatment as prescribed by your doctor.  Patients and their families should watch out for new or worsening thoughts of suicide or depression. Also watch out for sudden changes in feelings such as feeling anxious, agitated, panicky, irritable, hostile, aggressive, impulsive, severely restless, overly excited and hyperactive, or not being able to sleep. If this happens, especially at the beginning of treatment or after a change in dose, call your health care professional.  You may get drowsy or dizzy. Do not drive, use machinery, or do anything that needs mental alertness until you know how this medicine affects you. Do not stand or sit up quickly, especially if you are an older patient. This reduces the risk of dizzy or fainting spells. Alcohol may interfere with the effect of this medicine. Avoid alcoholic drinks.  Your mouth may get dry. Chewing sugarless gum or sucking hard candy, and drinking plenty of water may help. Contact your doctor if the problem does not go away or is severe.  This medicine may affect blood sugar levels. If you have diabetes, check with your doctor or health care professional before you change your diet or the dose of your diabetic medicine.  NOTE:This sheet is a summary. It may not cover all possible information. If you have questions about this medicine, talk to your doctor, pharmacist, or health care provider. Copyright© 2017 [employer]        Amphetamine; Dextroamphetamine tablets  What is this medicine?  AMPHETAMINE; DEXTROAMPHETAMINE(am FET a meen; dex troe am FET a meen) is used to treat  attention-deficit hyperactivity disorder (ADHD). It may also be used for narcolepsy. Federal law prohibits giving this medicine to any person other than the person for whom it was prescribed. Do not share this medicine with anyone else.  How should I use this medicine?  Take this medicine by mouth with a glass of water. Follow the directions on the prescription label. Take your doses at regular intervals. Do not take your medicine more often than directed. Do not suddenly stop your medicine. You must gradually reduce the dose or you may feel withdrawal effects. Ask your doctor or health care professional for advice.  Talk to your pediatrician regarding the use of this medicine in children. Special care may be needed. While this drug may be prescribed for children as young as 3 years for selected conditions, precautions do apply.  What side effects may I notice from receiving this medicine?  Side effects that you should report to your doctor or health care professional as soon as possible:  · allergic reactions like skin rash, itching or hives, swelling of the face, lips, or tongue  · changes in vision  · chest pain or chest tightness  · confusion, trouble speaking or understanding  · fast, irregular heartbeat  · fingers or toes feel numb, cool, painful  · hallucination, loss of contact with reality  · high blood pressure  · males: prolonged or painful erection  · seizures  · severe headaches  · shortness of breath  · suicidal thoughts or other mood changes  · trouble walking, dizziness, loss of balance or coordination  · uncontrollable head, mouth, neck, arm, or leg movements  Side effects that usually do not require medical attention (report to your doctor or health care professional if they continue or are bothersome):  · anxious  · headache  · loss of appetite  · nausea, vomiting  · trouble sleeping  · weight loss  What may interact with this medicine?  Do not take this medicine with any of the following  medications:  · MAOIS like Carbex, Eldepryl, Marplan, Nardil, and Parnate  · other stimulant medicines for attention disorders, weight loss, or to stay awake  This medicine may also interact with the following medications:  · acetazolamide  · ammonium chloride  · antacids  · ascorbic acid  · atomoxetine  · caffeine  · certain medicines for blood pressure  · certain medicines for depression, anxiety, or psychotic disturbances  · certain medicines for seizures like carbamazepine, phenobarbital, phenytoin  · certain medicines for stomach problems like cimetidine, famotidine, omeprazole, lansoprazole  · cold or allergy medicines  · glutamic acid  · lithium  · meperidine  · methenamine; sodium acid phosphate  · narcotic medicines for pain  · norepinephrine  · phenothiazines like chlorpromazine, mesoridazine, prochlorperazine, thioridazine  · sodium acid phosphate  · sodium bicarbonate  What if I miss a dose?  If you miss a dose, take it as soon as you can. If it is almost time for your next dose, take only that dose. Do not take double or extra doses.  Where should I keep my medicine?  Keep out of the reach of children. This medicine can be abused. Keep your medicine in a safe place to protect it from theft. Do not share this medicine with anyone. Selling or giving away this medicine is dangerous and against the law.  Store at room temperature between 15 and 30 degrees C (59 and 86 degrees F). Keep container tightly closed. Throw away any unused medicine after the expiration date. Dispose of properly. This medicine may cause accidental overdose and death if it is taken by other adults, children, or pets. Mix any unused medicine with a substance like cat litter or coffee grounds. Then throw the medicine away in a sealed container like a sealed bag or a coffee can with a lid. Do not use the medicine after the expiration date.  What should I tell my health care provider before I take this medicine?  They need to know if you  have any of these conditions:  · anxiety or panic attacks  · circulation problems in fingers and toes  · glaucoma  · hardening or blockages of the arteries or heart blood vessels  · heart disease or a heart defect  · high blood pressure  · history of a drug or alcohol abuse problem  · history of stroke  · kidney disease  · liver disease  · mental illness  · seizures  · suicidal thoughts, plans, or attempt; a previous suicide attempt by you or a family member  · thyroid disease  · Tourette's syndrome  · an unusual or allergic reaction to dextroamphetamine, other amphetamines, other medicines, foods, dyes, or preservatives  · pregnant or trying to get pregnant  · breast-feeding  What should I watch for while using this medicine?  Visit your doctor or health care professional for regular checks on your progress. This prescription requires that you follow special procedures with your doctor and pharmacy. You will need to have a new written prescription from your doctor every time you need a refill.  This medicine may affect your concentration, or hide signs of tiredness. Until you know how this medicine affects you, do not drive, ride a bicycle, use machinery, or do anything that needs mental alertness.  Tell your doctor or health care professional if this medicine loses its effects, or if you feel you need to take more than the prescribed amount. Do not change the dosage without talking to your doctor or health care professional.  Decreased appetite is a common side effect when starting this medicine. Eating small, frequent meals or snacks can help. Talk to your doctor if you continue to have poor eating habits. Height and weight growth of a child taking this medicine will be monitored closely.  Do not take this medicine close to bedtime. It may prevent you from sleeping.  If you are going to need surgery, a MRI, CT scan, or other procedure, tell your doctor that you are taking this medicine. You may need to stop  taking this medicine before the procedure.  Tell your doctor or healthcare professional right away if you notice unexplained wounds on your fingers and toes while taking this medicine. You should also tell your healthcare provider if you experience numbness or pain, changes in the skin color, or sensitivity to temperature in your fingers or toes.  NOTE:This sheet is a summary. It may not cover all possible information. If you have questions about this medicine, talk to your doctor, pharmacist, or health care provider. Copyright© 2017 [employer]

## 2020-09-03 NOTE — PROGRESS NOTES
"Outpatient Psychiatry Follow-Up Visit (MD/NP)    9/3/2020      The patient location is: [place], [place]  The chief complaint leading to consultation is: inattention, depression and anxiety    Visit type: audiovisual    Face to Face time with patient: 20 minutes  30 minutes of total time spent on the encounter, which includes face to face time and non-face to face time preparing to see the patient (eg, review of tests), Obtaining and/or reviewing separately obtained history, Documenting clinical information in the electronic or other health record, Independently interpreting results (not separately reported) and communicating results to the patient/family/caregiver, or Care coordination (not separately reported).         Each patient to whom he or she provides medical services by telemedicine is:  (1) informed of the relationship between the physician and patient and the respective role of any other health care provider with respect to management of the patient; and (2) notified that he or she may decline to receive medical services by telemedicine and may withdraw from such care at any time.            Clinical Status of Patient:  Outpatient (Ambulatory)    Chief Complaint:  Stormy Carlisle is a 40 y.o. female who presents today for follow-up of depression, anxiety and attention problems. She is taking  20 mg prozac & adderall 10 mg BID for ADHD. She is no longer taking vistaril.This is our first session.    Current Psychiatric Medications:  · prozac 20 mg daily for mood  · Adderall 10 mg PO BID    Interval History and Content of Current Session:    Patient stated that she has been doing well since her last visit. She described her mood as "fine. I am doing more yoga and I find it helpful." She stated she finds adderall to be very helpful in increasing her focus, attention and productivity. She stated that she even finds it helpful when she is helping her kids with homework. She stated her daughter also has " diagnosis of ADHD and patient finds her daughter displaying the same ADHD symptoms the patient did when she was young. She reported good sleep and appetite. She reported improved depression and anxiety with prozac. She rated her depression at 5/10 with 10/10 being the most severe. She stated that vistaril did not work for her and felt too sedated, groggy, and tired which caused to feel anxious and depressed. Adderall continues to be helpful for concentration, attention, focus. Denied SE including jitteriness, CP, palpitations. Denied SI/HI. Denied hopelessness.No objective ssx of jonny or psychosis.       Past med trials: Wellbutrin (helped mood/motivation, but caused her to pass out, have decrease appetite, weight loss), Paxil was very hard to come off of and does not remember how much it helped. Prozac worked but was insufficiently helpful, venlafaxine (brain zaps and interfered with sleep) and vistaril (too groggy, sedating and made her feel tired that caused her to feel anxious and depressed)    Review of Systems     MEDICAL REVIEW OF SYSTEMS  History obtained from the patient   General : NO chills or fever   Eyes: NO  visual changes   ENT: NO  nasal discharge or sore throat   Endocrine: NO weight changes   Dermatological: NO rashes   Respiratory: NO cough, shortness of breath   Cardiovascular: NO chest pain, palpitations or racing heart   Gastrointestinal: NO nausea, vomiting, constipation or diarrhea   Musculoskeletal: NO muscle pain or stiffness   Neurological: NO , dizziness, headaches or tremors   Psychiatric: please see HPI        Past Medical, Family and Social History: The patient's past medical, family and social history have been reviewed and updated as appropriate within the electronic medical record - see encounter notes.  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 Has been through CBT but not recently. Med trials as  "above plus ambien per chart in 2015    Compliance: yes    Side effects: insomnia which she attributes to effexor    Risk Parameters:  Patient reports no suicidal ideation  Patient reports no homicidal ideation  Patient reports no self-injurious behavior  Patient reports no violent behavior    Exam (detailed: at least 9 elements; comprehensive: all 15 elements)   Constitutional  Vitals:  Most recent vital signs, dated greater than 90 days prior to this appointment, were reviewed.   There were no vitals filed for this visit.   General:  unremarkable, age appropriate     Musculoskeletal  Muscle Strength/Tone:  no dystonia, no tremor, no tic   Gait & Station:  Normal gait     Psychiatric  Speech:  no latency; no press, spontaneous,    Mood & Affect:  "fine"  euthymic,    Thought Process:  normal and logical, logical   Associations:  intact   Thought Content:  normal, no suicidality, no homicidality, delusions, or paranoia   Insight:  intact, has awareness of illness   Judgement: behavior is adequate to circumstances, age appropriate   Orientation:  grossly intact   Memory: intact for content of interview, able to remember recent events- yes, able to remember remote events- yes   Language: grossly intact   Attention Span & Concentration:  able to focus   Fund of Knowledge:  intact and appropriate to age and level of education     Assessment and Diagnosis   Status/Progress: Based on the examination today, the patient's problem(s) is/are not well controlled.  New problems have not been presented today.   Co-morbidities, Diagnostic uncertainty and Lack of compliance are not complicating management of the primary condition.  There are no active rule-out diagnoses for this patient at this time.     General Impression: 36 y/o F with hx of depression, anxiety and ADHD who presents for follow up. Pt was tapered off of effexor and was started on  10 mg prozac to help with withdrawal which has been effective but reports recurrence " of depressed mood associated depressive sx and dose was increased to 20 mg. Currently depressive sx and anxiety are over all well controlled. Will continue current medications.      ICD-10-CM ICD-9-CM   1. Attention deficit hyperactivity disorder (ADHD), predominantly inattentive type  F90.0 314.00   2. Moderate episode of recurrent major depressive disorder  F33.1 296.32   3. JAMEY (generalized anxiety disorder)  F41.1 300.02     Intervention/Counseling/Treatment Plan     Depression & Anxiety  Continue prozac 20 mg to help with depressive sx  Discontinue vistaril 25 mg prn for anxiety ( caused her to feel tired, depressed and found it too sedating)       ADHD, inattentive type  · Continue Adderall IR 10 mg PO BID      Not currently in psychotherapy but is in couple's therapy; recommended to continue as she finds it helpful     Previously discussed risks, benefits, AE's (incl serotonin syndrome) SE's (including but not limited to GI sx, insomnia, agitation/anxiety/activation, appetite changes, HA, hypotension, others) inherent unpredictability of illness and treatment option    Discussed informed consent, diagnosis, risks and benefits of proposed treatment above vs alternative treatments vs no treatment, and potential side effects of these treatments. The patient expresses understanding of the above and displays the capacity to agree with this treatment given said understanding. Patient also agrees that, currently, the benefits outweigh the risks and would like to pursue treatment at this time. Answered all questions and discussed follow up. Encouraged patient to contact us with any questions or concerns.    Encouraged Patient to keep future appointment    Take medications as prescribed and abstain from substance abuse    Patient to present to ED for thoughts to harm herself or others        Return to Clinic: 3 months or sooner if needed    Gina Murphy, BREANNA-BC

## 2020-11-27 ENCOUNTER — OFFICE VISIT (OUTPATIENT)
Dept: URGENT CARE | Facility: CLINIC | Age: 40
End: 2020-11-27
Payer: COMMERCIAL

## 2020-11-27 VITALS
WEIGHT: 132 LBS | DIASTOLIC BLOOD PRESSURE: 77 MMHG | OXYGEN SATURATION: 99 % | BODY MASS INDEX: 24.92 KG/M2 | HEIGHT: 61 IN | SYSTOLIC BLOOD PRESSURE: 122 MMHG | RESPIRATION RATE: 16 BRPM | HEART RATE: 84 BPM | TEMPERATURE: 98 F

## 2020-11-27 DIAGNOSIS — J06.9 VIRAL URI: Primary | ICD-10-CM

## 2020-11-27 LAB
CTP QC/QA: YES
SARS-COV-2 RDRP RESP QL NAA+PROBE: NEGATIVE

## 2020-11-27 PROCEDURE — U0002: ICD-10-PCS | Mod: QW,S$GLB,, | Performed by: PHYSICIAN ASSISTANT

## 2020-11-27 PROCEDURE — 99203 OFFICE O/P NEW LOW 30 MIN: CPT | Mod: S$GLB,,, | Performed by: PHYSICIAN ASSISTANT

## 2020-11-27 PROCEDURE — U0002 COVID-19 LAB TEST NON-CDC: HCPCS | Mod: QW,S$GLB,, | Performed by: PHYSICIAN ASSISTANT

## 2020-11-27 PROCEDURE — 99203 PR OFFICE/OUTPT VISIT, NEW, LEVL III, 30-44 MIN: ICD-10-PCS | Mod: S$GLB,,, | Performed by: PHYSICIAN ASSISTANT

## 2020-11-27 PROCEDURE — 3008F BODY MASS INDEX DOCD: CPT | Mod: CPTII,S$GLB,, | Performed by: PHYSICIAN ASSISTANT

## 2020-11-27 PROCEDURE — 3008F PR BODY MASS INDEX (BMI) DOCUMENTED: ICD-10-PCS | Mod: CPTII,S$GLB,, | Performed by: PHYSICIAN ASSISTANT

## 2020-11-27 NOTE — PROGRESS NOTES
"Subjective:       Patient ID: Stormy Carlisle is a 40 y.o. female.    Vitals:  height is 5' 1" (1.549 m) and weight is 59.9 kg (132 lb). Her temperature is 98.2 °F (36.8 °C). Her blood pressure is 122/77 and her pulse is 84. Her respiration is 16 and oxygen saturation is 99%.     Chief Complaint: COVID-19 Concerns    Patient presents requesting COVID test.  She admits to sore throat, fever with max temperature 100.4° F, mild infrequent cough, nausea that began yesterday.  Sore throat has improved today and she has not had fever today.  Sore throat severity 3/10, mild.  Denies difficulty breathing..     Other  This is a new problem. The current episode started yesterday. The problem occurs intermittently. The problem has been gradually worsening. Associated symptoms include coughing, a fever, nausea and a sore throat. Pertinent negatives include no abdominal pain, arthralgias, chest pain, chills, congestion, diaphoresis, fatigue, headaches, joint swelling, myalgias, neck pain, rash, vertigo, vomiting or weakness. She has tried nothing for the symptoms.       Constitution: Positive for fever. Negative for chills, sweating, fatigue and unexpected weight change.   HENT: Positive for sore throat. Negative for congestion, postnasal drip and sinus pain.    Neck: Negative for neck pain, neck stiffness and painful lymph nodes.   Cardiovascular: Negative for chest pain, leg swelling, palpitations and sob on exertion.   Eyes: Negative for double vision and blurred vision.   Respiratory: Positive for cough. Negative for sputum production, bloody sputum, shortness of breath, stridor and wheezing.    Gastrointestinal: Positive for nausea. Negative for abdominal pain, vomiting and diarrhea.   Genitourinary: Negative for dysuria, frequency, urgency, flank pain and history of kidney stones.   Musculoskeletal: Negative for joint pain, joint swelling, muscle cramps and muscle ache.   Skin: Negative for color change, pale, rash and " bruising.   Allergic/Immunologic: Negative for seasonal allergies.   Neurological: Negative for dizziness, history of vertigo, light-headedness, passing out and headaches.   Hematologic/Lymphatic: Negative for swollen lymph nodes.   Psychiatric/Behavioral: Negative for nervous/anxious, sleep disturbance and depression. The patient is not nervous/anxious.        Objective:      Physical Exam   Constitutional: She is oriented to person, place, and time. She appears well-developed. She is cooperative.  Non-toxic appearance. She does not appear ill. No distress.   HENT:   Head: Normocephalic and atraumatic.   Ears:   Right Ear: Hearing, tympanic membrane, external ear and ear canal normal.   Left Ear: Hearing, tympanic membrane, external ear and ear canal normal.   Nose: Nose normal. No mucosal edema, rhinorrhea, purulent discharge or nasal deformity. No epistaxis. Right sinus exhibits no maxillary sinus tenderness and no frontal sinus tenderness. Left sinus exhibits no maxillary sinus tenderness and no frontal sinus tenderness.   Mouth/Throat: Uvula is midline, oropharynx is clear and moist and mucous membranes are normal. No trismus in the jaw. Normal dentition. No uvula swelling. No oropharyngeal exudate, posterior oropharyngeal edema, posterior oropharyngeal erythema, tonsillar abscesses or cobblestoning. Tonsils are 1+ on the right. Tonsils are 1+ on the left. No tonsillar exudate.   No erythema, swelling, exudate of tonsils/pharynx.  Uvula midline      Comments: No erythema, swelling, exudate of tonsils/pharynx.  Uvula midline  Eyes: Conjunctivae and lids are normal. No scleral icterus.   Neck: Trachea normal, full passive range of motion without pain and phonation normal. Neck supple. No neck rigidity. No edema and no erythema present.   Cardiovascular: Normal rate, regular rhythm, normal heart sounds and normal pulses.   Pulmonary/Chest: Effort normal and breath sounds normal. No accessory muscle usage or  stridor. No tachypnea and no bradypnea. No respiratory distress. She has no decreased breath sounds. She has no wheezes. She has no rhonchi. She has no rales.   Abdominal: Normal appearance.   Musculoskeletal: Normal range of motion.         General: No deformity.   Lymphadenopathy:        Head (right side): No submandibular, no preauricular and no posterior auricular adenopathy present.        Head (left side): No submandibular, no preauricular and no posterior auricular adenopathy present.     She has no cervical adenopathy.   Neurological: She is alert and oriented to person, place, and time. She exhibits normal muscle tone. Coordination normal.   Skin: Skin is warm, dry, intact, not diaphoretic and not pale. Psychiatric: Her speech is normal and behavior is normal. Judgment and thought content normal.   Nursing note and vitals reviewed.          Results for orders placed or performed in visit on 11/27/20   POCT COVID-19 Rapid Screening   Result Value Ref Range    POC Rapid COVID Negative Negative     Acceptable Yes        Assessment:       1. Viral URI        Plan:         Viral URI  -     POCT COVID-19 Rapid Screening      Patient Instructions     You have tested negative for COVID-19 today.  If you did not have any close exposure as defined below, then effective today, you can return to your normal daily activities including social distancing, wearing masks, and frequent handwashing.    A close exposure is defined as anyone who had a masked or an unmasked exposure to a known COVID -19 positive person, at less than 6 ft for more than 15 minutes.  If your exposure meets this definition, then you are required to quarantine for 14 days per the CDC.    The 14 day quarantine begins from the day you were exposed, not the day of your test.  For example, if your exposure was on a Monday, and you waited until Friday of the same week to get tested and it was negative, your 14 day quarantine begins from  that Monday, not the Friday you tested negative.    If you developed symptoms since the exposure, and your test was negative today, you still have to quarantine for 14 days from the date of the exposure.    So if you meet the definition of a close exposure, A NEGATIVE TEST DOES NOT GET YOU OUT OF 14 DAYS OF QUARANTINE!      - Rest.    - Drink plenty of fluids.      - Viral upper respiratory infections typically run their course in 10-14 days.     - Tylenol or Ibuprofen as directed as needed for fever/pain. Avoid tylenol if you have a history of liver disease. Do not take ibuprofen if you have a history of GI bleeding, kidney disease, or if you take blood thinners.     - You can take over-the-counter claritin, zyrtec, allegra, or xyzal as directed. These are antihistamines that can help with runny nose, nasal congestion, sneezing, and helps to dry up post-nasal drip, which usually causes sore throat and cough.   - If you do NOT have high blood pressure, you may use a decongestant form (D)  of this medication or if you do not take the D form, you can take sudafed  (pseudoephedrine) over the counter, which is a decongestant.    - You can use Flonase (fluticasone) nasal spray as directed for sinus congestion and postnasal drip. This is a steroid nasal spray that works locally over time to decrease the inflammation in your nose/sinuses and help with allergic symptoms. This is not an quick- relief spray like afrin, but it works well if used daily.  Discontinue if you develop nose bleed  - use nasal saline prior to Flonase.    - Use Ocean Spray Nasal Saline 1-3 puffs each nostril every 2-3 hours then blow out onto tissue. This is to irrigate the nasal passage way to clear the sinus openings. Use until sinus problem resolved.    - you can take plain Mucinex (guaifenesin) 1200 mg twice a day to help loosen mucous    -warm salt water gargles can help with sore throat    - warm tea with honey can help with cough. Honey is a  natural cough suppressant.    - Follow up with your PCP or specialty clinic as directed in the next 1-2 weeks if not improved or as needed.  You can call [phone] to schedule an appointment with the appropriate provider.      - Go to the ER if you develop new or worsening symptoms.     - You must understand that you have received an Urgent Care treatment only and that you may be released before all of your medical problems are known or treated.   - You, the patient, will arrange for follow up care as instructed.   - If your condition worsens or fails to improve we recommend that you receive another evaluation at the ER immediately or contact your PCP to discuss your concerns or return here.         Self-Care for Sore Throats    Sore throats happen for many reasons, such as colds, allergies, and infections caused by viruses or bacteria. In any case, your throat becomes red and sore. Your goal for self-care is to reduce your discomfort while giving your throat a chance to heal.  Moisten and soothe your throat  Tips include the following:  · Try a sip of water first thing after waking up.  · Keep your throat moist by drinking 6 or more glasses of clear liquids every day.  · Run a cool-air humidifier in your room overnight.  · Avoid cigarette smoke.   · Suck on throat lozenges, cough drops, hard candy, ice chips, or frozen fruit-juice bars. Use the sugar-free versions if your diet or medical condition requires them.  Gargle to ease irritation  Gargling every hour or 2 can ease irritation. Try gargling with 1 of these solutions:  · 1/4 teaspoon of salt in 1/2 cup of warm water  · An over-the-counter anesthetic gargle  Use medicine for more relief  Over-the-counter medicine can reduce sore throat symptoms. Ask your pharmacist if you have questions about which medicine to use:  · Ease pain with anesthetic sprays. Aspirin or an aspirin substitute also helps. Remember, never give aspirin to anyone 18 or younger, or if  you are already taking blood thinners.   · For sore throats caused by allergies, try antihistamines to block the allergic reaction.  · Remember: unless a sore throat is caused by a bacterial infection, antibiotics wont help you.  Prevent future sore throats  Prevention tips include the following:  · Stop smoking or reduce contact with secondhand smoke. Smoke irritates the tender throat lining.  · Limit contact with pets and with allergy-causing substances, such as pollen and mold.  · When youre around someone with a sore throat or cold, wash your hands often to keep viruses or bacteria from spreading.  · Dont strain your vocal cords.  Call your healthcare provider  Contact your healthcare provider if you have:  · A temperature over 101°F (38.3°C)  · White spots on the throat  · Great difficulty swallowing  · Trouble breathing  · A skin rash  · Recent exposure to someone else with strep bacteria  · Severe hoarseness and swollen glands in the neck or jaw   Date Last Reviewed: 8/1/2016  © 9040-5775 [employer]. [address] [place] [address], [place] [address]. All rights reserved. This information is not intended as a substitute for professional medical care. Always follow your healthcare professional's instructions.

## 2020-11-27 NOTE — PATIENT INSTRUCTIONS
You have tested negative for COVID-19 today.  If you did not have any close exposure as defined below, then effective today, you can return to your normal daily activities including social distancing, wearing masks, and frequent handwashing.    A close exposure is defined as anyone who had a masked or an unmasked exposure to a known COVID -19 positive person, at less than 6 ft for more than 15 minutes.  If your exposure meets this definition, then you are required to quarantine for 14 days per the CDC.    The 14 day quarantine begins from the day you were exposed, not the day of your test.  For example, if your exposure was on a Monday, and you waited until Friday of the same week to get tested and it was negative, your 14 day quarantine begins from that Monday, not the Friday you tested negative.    If you developed symptoms since the exposure, and your test was negative today, you still have to quarantine for 14 days from the date of the exposure.    So if you meet the definition of a close exposure, A NEGATIVE TEST DOES NOT GET YOU OUT OF 14 DAYS OF QUARANTINE!      - Rest.    - Drink plenty of fluids.      - Viral upper respiratory infections typically run their course in 10-14 days.     - Tylenol or Ibuprofen as directed as needed for fever/pain. Avoid tylenol if you have a history of liver disease. Do not take ibuprofen if you have a history of GI bleeding, kidney disease, or if you take blood thinners.     - You can take over-the-counter claritin, zyrtec, allegra, or xyzal as directed. These are antihistamines that can help with runny nose, nasal congestion, sneezing, and helps to dry up post-nasal drip, which usually causes sore throat and cough.   - If you do NOT have high blood pressure, you may use a decongestant form (D)  of this medication or if you do not take the D form, you can take sudafed  (pseudoephedrine) over the counter, which is a decongestant.    - You can use Flonase (fluticasone) nasal  spray as directed for sinus congestion and postnasal drip. This is a steroid nasal spray that works locally over time to decrease the inflammation in your nose/sinuses and help with allergic symptoms. This is not an quick- relief spray like afrin, but it works well if used daily.  Discontinue if you develop nose bleed  - use nasal saline prior to Flonase.    - Use Ocean Spray Nasal Saline 1-3 puffs each nostril every 2-3 hours then blow out onto tissue. This is to irrigate the nasal passage way to clear the sinus openings. Use until sinus problem resolved.    - you can take plain Mucinex (guaifenesin) 1200 mg twice a day to help loosen mucous    -warm salt water gargles can help with sore throat    - warm tea with honey can help with cough. Honey is a natural cough suppressant.    - Follow up with your PCP or specialty clinic as directed in the next 1-2 weeks if not improved or as needed.  You can call [phone] to schedule an appointment with the appropriate provider.      - Go to the ER if you develop new or worsening symptoms.     - You must understand that you have received an Urgent Care treatment only and that you may be released before all of your medical problems are known or treated.   - You, the patient, will arrange for follow up care as instructed.   - If your condition worsens or fails to improve we recommend that you receive another evaluation at the ER immediately or contact your PCP to discuss your concerns or return here.         Self-Care for Sore Throats    Sore throats happen for many reasons, such as colds, allergies, and infections caused by viruses or bacteria. In any case, your throat becomes red and sore. Your goal for self-care is to reduce your discomfort while giving your throat a chance to heal.  Moisten and soothe your throat  Tips include the following:  · Try a sip of water first thing after waking up.  · Keep your throat moist by drinking 6 or more glasses of clear liquids every  day.  · Run a cool-air humidifier in your room overnight.  · Avoid cigarette smoke.   · Suck on throat lozenges, cough drops, hard candy, ice chips, or frozen fruit-juice bars. Use the sugar-free versions if your diet or medical condition requires them.  Gargle to ease irritation  Gargling every hour or 2 can ease irritation. Try gargling with 1 of these solutions:  · 1/4 teaspoon of salt in 1/2 cup of warm water  · An over-the-counter anesthetic gargle  Use medicine for more relief  Over-the-counter medicine can reduce sore throat symptoms. Ask your pharmacist if you have questions about which medicine to use:  · Ease pain with anesthetic sprays. Aspirin or an aspirin substitute also helps. Remember, never give aspirin to anyone 18 or younger, or if you are already taking blood thinners.   · For sore throats caused by allergies, try antihistamines to block the allergic reaction.  · Remember: unless a sore throat is caused by a bacterial infection, antibiotics wont help you.  Prevent future sore throats  Prevention tips include the following:  · Stop smoking or reduce contact with secondhand smoke. Smoke irritates the tender throat lining.  · Limit contact with pets and with allergy-causing substances, such as pollen and mold.  · When youre around someone with a sore throat or cold, wash your hands often to keep viruses or bacteria from spreading.  · Dont strain your vocal cords.  Call your healthcare provider  Contact your healthcare provider if you have:  · A temperature over 101°F (38.3°C)  · White spots on the throat  · Great difficulty swallowing  · Trouble breathing  · A skin rash  · Recent exposure to someone else with strep bacteria  · Severe hoarseness and swollen glands in the neck or jaw   Date Last Reviewed: 8/1/2016  © 5120-0696 [employer]. [address] [place] [address], [place] [address]. All rights reserved. This information is not intended as a substitute for professional medical care.  Always follow your healthcare professional's instructions.

## 2020-12-22 ENCOUNTER — PATIENT MESSAGE (OUTPATIENT)
Dept: PSYCHIATRY | Facility: CLINIC | Age: 40
End: 2020-12-22

## 2020-12-22 DIAGNOSIS — F90.0 ATTENTION DEFICIT HYPERACTIVITY DISORDER (ADHD), PREDOMINANTLY INATTENTIVE TYPE: ICD-10-CM

## 2020-12-23 ENCOUNTER — PATIENT MESSAGE (OUTPATIENT)
Dept: PSYCHIATRY | Facility: CLINIC | Age: 40
End: 2020-12-23

## 2020-12-23 RX ORDER — DEXTROAMPHETAMINE SACCHARATE, AMPHETAMINE ASPARTATE, DEXTROAMPHETAMINE SULFATE AND AMPHETAMINE SULFATE 2.5; 2.5; 2.5; 2.5 MG/1; MG/1; MG/1; MG/1
10 TABLET ORAL 2 TIMES DAILY
Qty: 14 TABLET | Refills: 0 | Status: SHIPPED | OUTPATIENT
Start: 2020-12-23 | End: 2020-12-30 | Stop reason: SDUPTHER

## 2020-12-30 ENCOUNTER — OFFICE VISIT (OUTPATIENT)
Dept: PSYCHIATRY | Facility: CLINIC | Age: 40
End: 2020-12-30
Payer: COMMERCIAL

## 2020-12-30 DIAGNOSIS — F33.1 MODERATE EPISODE OF RECURRENT MAJOR DEPRESSIVE DISORDER: Primary | ICD-10-CM

## 2020-12-30 DIAGNOSIS — F41.1 GAD (GENERALIZED ANXIETY DISORDER): ICD-10-CM

## 2020-12-30 DIAGNOSIS — F90.0 ATTENTION DEFICIT HYPERACTIVITY DISORDER (ADHD), PREDOMINANTLY INATTENTIVE TYPE: ICD-10-CM

## 2020-12-30 PROCEDURE — 99214 PR OFFICE/OUTPT VISIT, EST, LEVL IV, 30-39 MIN: ICD-10-PCS | Mod: 95,,, | Performed by: NURSE PRACTITIONER

## 2020-12-30 PROCEDURE — 99214 OFFICE O/P EST MOD 30 MIN: CPT | Mod: 95,,, | Performed by: NURSE PRACTITIONER

## 2020-12-30 RX ORDER — DEXTROAMPHETAMINE SACCHARATE, AMPHETAMINE ASPARTATE, DEXTROAMPHETAMINE SULFATE AND AMPHETAMINE SULFATE 2.5; 2.5; 2.5; 2.5 MG/1; MG/1; MG/1; MG/1
10 TABLET ORAL 2 TIMES DAILY
Qty: 60 TABLET | Refills: 0 | Status: SHIPPED | OUTPATIENT
Start: 2020-12-30 | End: 2021-01-29

## 2020-12-30 RX ORDER — DEXTROAMPHETAMINE SACCHARATE, AMPHETAMINE ASPARTATE, DEXTROAMPHETAMINE SULFATE AND AMPHETAMINE SULFATE 2.5; 2.5; 2.5; 2.5 MG/1; MG/1; MG/1; MG/1
10 TABLET ORAL 2 TIMES DAILY
Qty: 60 TABLET | Refills: 0 | Status: SHIPPED | OUTPATIENT
Start: 2021-01-28 | End: 2021-02-27

## 2020-12-30 RX ORDER — DEXTROAMPHETAMINE SACCHARATE, AMPHETAMINE ASPARTATE, DEXTROAMPHETAMINE SULFATE AND AMPHETAMINE SULFATE 2.5; 2.5; 2.5; 2.5 MG/1; MG/1; MG/1; MG/1
10 TABLET ORAL 2 TIMES DAILY
Qty: 60 TABLET | Refills: 0 | Status: SHIPPED | OUTPATIENT
Start: 2021-02-26 | End: 2021-03-24 | Stop reason: SDUPTHER

## 2020-12-30 NOTE — PROGRESS NOTES
"Outpatient Psychiatry Follow-Up Visit (MD/NP)    12/30/2020      The patient location is: [place], [place]  The chief complaint leading to consultation is: inattention, depression and anxiety    Visit type: audiovisual    Face to Face time with patient: 14 minutes  25 minutes of total time spent on the encounter, which includes face to face time and non-face to face time preparing to see the patient (eg, review of tests), Obtaining and/or reviewing separately obtained history, Documenting clinical information in the electronic or other health record, Independently interpreting results (not separately reported) and communicating results to the patient/family/caregiver, or Care coordination (not separately reported).         Each patient to whom he or she provides medical services by telemedicine is:  (1) informed of the relationship between the physician and patient and the respective role of any other health care provider with respect to management of the patient; and (2) notified that he or she may decline to receive medical services by telemedicine and may withdraw from such care at any time.            Clinical Status of Patient:  Outpatient (Ambulatory)    Chief Complaint:  Stormy Carlisle is a 40 y.o. female who presents today for follow-up of depression, anxiety and attention problems. She is taking  20 mg Prozac & adderall 10 mg BID for ADHD. She is no longer taking vistaril.This is our first session.    Current Psychiatric Medications:  · Prozac 20 mg daily for mood  · Adderall 10 mg PO BID    Interval History and Content of Current Session:    Patient stated that she has been doing well since her last visit. She described her mood as "alright. We are doing well" and her affect was euthymic. She continues to report that adderall is very helpful in increasing her focus, attention and productivity. She still finds it helpful when she is helping her kids with their homework. She reported good sleep and " appetite. She reported improved depression and anxiety with Prozac 20 mg po daily. She rated her depression and anxiety at 4/10 with 10/10 being the most severe. She is no longer taking vistaril because it did not work, felt too sedated, groggy, and tired which caused her to feel more anxious and depressed. She denied SE and AE including jitteriness, CP, palpitations. Denied SI/HI. Denied hopelessness or helplessness. No objective ssx of jonny or psychosis. VS are within normal range.      Past med trials: Wellbutrin (helped mood/motivation, but caused her to pass out, have decrease appetite, weight loss), Paxil was very hard to come off of and does not remember how much it helped. Prozac worked but was insufficiently helpful, venlafaxine (brain zaps and interfered with sleep) and vistaril (too groggy, sedating and made her feel tired that caused her to feel anxious and depressed)    Review of Systems     MEDICAL REVIEW OF SYSTEMS  History obtained from the patient   General : NO chills or fever   Eyes: NO  visual changes   ENT: NO  nasal discharge or sore throat   Endocrine: NO weight changes   Dermatological: NO rashes   Respiratory: NO cough, shortness of breath   Cardiovascular: NO chest pain, palpitations or racing heart   Gastrointestinal: NO nausea, vomiting, constipation or diarrhea   Musculoskeletal: NO muscle pain or stiffness   Neurological: NO , dizziness, headaches or tremors   Psychiatric: please see HPI        Past Medical, Family and Social History: The patient's past medical, family and social history have been reviewed and updated as appropriate within the electronic medical record - see encounter notes.  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 Has been through CBT but not recently. Med trials as above plus ambien per chart in 2015    Compliance: yes    Side effects: insomnia which she attributes to  "effexor    Risk Parameters:  Patient reports no suicidal ideation  Patient reports no homicidal ideation  Patient reports no self-injurious behavior  Patient reports no violent behavior    Exam (detailed: at least 9 elements; comprehensive: all 15 elements)   Constitutional  Vitals:  Most recent vital signs, dated greater than 90 days prior to this appointment, were reviewed.   There were no vitals filed for this visit.   General:  unremarkable, age appropriate     Musculoskeletal  Muscle Strength/Tone:  no dystonia, no tremor, no tic   Gait & Station:  Normal gait     Psychiatric  Speech:  no latency; no press, spontaneous,    Mood & Affect:  "alright"  euthymic,    Thought Process:  normal and logical, logical   Associations:  intact   Thought Content:  normal, no suicidality, no homicidality, delusions, or paranoia   Insight:  intact, has awareness of illness   Judgement: behavior is adequate to circumstances, age appropriate   Orientation:  grossly intact   Memory: intact for content of interview, able to remember recent events- yes, able to remember remote events- yes   Language: grossly intact   Attention Span & Concentration:  able to focus   Fund of Knowledge:  intact and appropriate to age and level of education     Assessment and Diagnosis   Status/Progress: Based on the examination today, the patient's problem(s) is/are not well controlled.  New problems have not been presented today.   Co-morbidities, Diagnostic uncertainty and Lack of compliance are not complicating management of the primary condition.  There are no active rule-out diagnoses for this patient at this time.     General Impression: 38 y/o F with hx of depression, anxiety and ADHD who presents for follow up. Pt was tapered off of effexor and was started on  10 mg prozac to help with withdrawal which has been effective but reports recurrence of depressed mood associated depressive sx and dose was increased to 20 mg. Currently depressive sx " and anxiety are over all well controlled. Will continue current medications.      ICD-10-CM ICD-9-CM   1. Moderate episode of recurrent major depressive disorder  F33.1 296.32   2. Attention deficit hyperactivity disorder (ADHD), predominantly inattentive type  F90.0 314.00   3. JAMEY (generalized anxiety disorder)  F41.1 300.02     Intervention/Counseling/Treatment Plan     Depression & Anxiety  Continue prozac 20 mg to help with depressive sx      Discontinue vistaril 25 mg prn for anxiety ( caused her to feel tired, depressed and found it too sedating)       ADHD, inattentive type  · Continue Adderall IR 10 mg PO BID      Continue psychotherapy    Previously discussed risks, benefits, AE's (incl serotonin syndrome) SE's (including but not limited to GI sx, insomnia, agitation/anxiety/activation, appetite changes, HA, hypotension, others) inherent unpredictability of illness and treatment option    Discussed informed consent, diagnosis, risks and benefits of proposed treatment above vs alternative treatments vs no treatment, and potential side effects of these treatments. The patient expresses understanding of the above and displays the capacity to agree with this treatment given said understanding. Patient also agrees that, currently, the benefits outweigh the risks and would like to pursue treatment at this time. Answered all questions and discussed follow up. Encouraged patient to contact us with any questions or concerns.    Encouraged Patient to keep future appointment    Take medications as prescribed and abstain from substance abuse    Patient to present to ED for thoughts to harm herself or others        Return to Clinic: 3 months or sooner if needed    Gina Murphy, JOSE MANUELP-BC

## 2020-12-30 NOTE — PATIENT INSTRUCTIONS
Fluoxetine capsules or tablets (Depression/Mood Disorders)  ¿Qué es magdy medicamento?  La FLUOXETINA pertenece a un josue de medicamentos llamados inhibidores selectivos de la recaptación de serotonina (ISRS). Ayuda a tratar problemas de estado de ánimo, tales césar depresión, trastorno obsesivo-compulsivo y trastorno de pánico. También puede tratar ciertos trastornos de la alimentación.  ¿Cómo diogenes utilizar magdy medicamento?  [place] magdy medicamento por vía oral con un vaso de agua. Siga las instrucciones de la etiqueta del medicamento. Puede zhanna magdy medicamento con o sin alimentos. [place] lety dosis a intervalos regulares. No tome ramsey medicamento con tj frecuencia mayor a la indicada. No deje de zhanna magdy medicamento repentinamente a menos que así indique ramsey médico. El detener magdy medicamento demasiado rápido puede causar efectos secundarios graves o puede empeorar ramsey condición.   Ramsey farmacéutico le dará tj [place] del medicamento especial con cada receta y relleno. Asegúrese de leer esta información cada vez cuidadosamente.  Hable con ramsey pediatra para informarse acerca del uso de magdy medicamento en niños. Aunque magdy medicamento puede ser recetado a niños yuen menores césar de 7 años de edad para condiciones selectivas, las precauciones se aplican.  ¿Qué efectos secundarios puedo tener al utilizar magdy medicamento?  Efectos secundarios que debe informar a ramsey médico o a ramsey profesional de la susan tan pronto césar sea posible: reacciones alérgicas, césar erupción cutánea, picazón o urticarias, hinchazón de la khadra, los labios o la lengua problemas respiratorios confusión dolor ocular, cambios en la visión pulso cardíaco rápido o irregular, palpitaciones síntomas gripales césar fiebre, escalofríos, tos, molestias y alma musculares o articulares convulsiones ideas suicidas u otros cambios de humor hinchazón o enrojecimiento en o alrededor del bran temblores dificultad para conciliar el sueño sangrado o magulladuras  inusuales cansancio o debilidad inusual vómitoEfectos secundarios que, por lo general, no requieren atención médica (debe informarlos a ramsey médico o a ramsey profesional de la susan si persisten o si son molestos): cambios en el deseo o desempeño sexual diarrea boca seca enrojecimiento dolor de teodora aumento o disminución del apetito náuseas sudoración  ¿Qué puede interactuar con mayte medicamento?  No tome mayte medicamento con ninguno de los siguientes medicamentos:  · otros medicamentos que contienen fluoxetina, tales césar Sarafem o Symbyax  · cisapride  · linezolid  · IMAOs, tales césar Carbex, Eldepryl, Marplan, Nardil y Parnate  · arti de metileno (vía intravenosa)  · pimozida  · tioridazina  Mayte medicamentotambién puede interactuar con los siguientes medicamentos:  · alcohol  · aspirina o medicamentos tipo aspirina  · carbamazepina  · ciertos medicamentos para la depresión, ansiedad o trastornos psicóticos  · ciertos medicamentos para migrañas, tales césar almotriptán, eletriptán, frovatriptán, naratriptán, rizatriptán, sumatriptán, zolmitriptán  · digoxina  · diuréticos  · fentanilo  · flecainida  · furazolidona  · isoniazida  · litio  · medicamentos para conciliar el sueño  · medicamentos que tratan o previenen coágulos sanguíneos, tales césar warfarina, enoxaparina y dalteparina  · JENNIFER, medicamentos para el dolor o la inflamación, tales césar el ibuprofeno o naproxeno  · fenitoína  · procarbazina  · propafenona  · rasagilina  · ritonavir  · suplementos césar hierba de [place], kava kava, valeriana  · tramadol  · triptófano  · vinblastina  ¿Qué sucede si me olvido de tj dosis?  Si olvida tj dosis, sáltese la dosis olvidada y vuelva al horario habitual de lety dosis. No tome dosis adicionales o dobles.  ¿Dónde diogenes guardar mi medicina?  [place] fuera del alcance de los niños.  [place] a temperatura ambiente, entre 15 y 30 grados C (59 y 86 grados F). Deseche todo el medicamento que no haya utilizado, después de  la fecha de vencimiento.  ¿Qué le diogenes informar a mi profesional de la susan antes de zhanna magdy medicamento?  Necesita saber si usted presenta alguno de los siguientes problemas o situaciones:  · trastorno bipolar o manía  · diabetes  · glaucoma  · enfermedad hepática  · psicosis  · convulsiones  · ideas suicidas o antecedentes de intento de suicidio  · tj reacción alérgica o inusual a la fluoxetina, otros medicamentos, alimentos, colorantes o conservantes  · si está embarazada o buscando quedar embarazada  · si está amamantando a un bebé  ¿A qué diogenes estar atento al usar magdy medicamento?  Informe a ramsey médico si lety síntomas no mejoran o si empeoran. Visite a ramsey médico o a ramsey profesional de la susan para chequear ramsey evolución periódicamente. Debido que puede ser necesario zhanna magdy medicamento corina varias semanas para que sea posible observar lety efectos en forma completa, es importante que sigue ramsey tratamiento césar recetado por ramsey médico.   Los pacientes y lety familias deben estar atentos si empeora la depresión o ideas suicidas. También esté atento a cambios repentinos o severos de emoción, tales césar el sentirse ansioso, agitado, lleno de pánico, irritable, hostil, agresivo, impulsivo, inquietud severa, demasiado excitado y hiperactivo o dificultad para conciliar el sueño. Si esto ocurre, especialmente al comenzar con el tratamiento o al cambiar de dosis, comuníquese con ramsey médico.  Puede experimentar somnolencia o mareos. No conduzca ni utilice maquinaria, ni tc nada que le exija permanecer en estado de alerta hasta que sepa cómo le afecta magdy medicamento. No se siente ni se ponga de pie con rapidez, especialmente si es un paciente de edad avanzada. [place] reduce el riesgo de mareos o desmayos. El alcohol puede interferir con el efecto de magdy medicamento. Evite consumir bebidas alcohólicas.  Se le podrá secar la boca. Masticar chicle sin azúcar, chupar caramelos duros y zhanna agua en abundancia le  ayudará a mantener la boca húmeda. Si el problema no desaparece o es severo, consulte a ramsey médico.  Mayte medicamento puede afectar lety niveles de azúcar en la fiona. Si tiene diabetes, consulte con ramsey médico o profesional de la susan antes de cambiar ramsey dieta o la dosis de ramsey medicamento para la diabetes.  © 9264-5839 [employer]. [address] [place] [id number]. Todos los derechos reservados. Esta información no pretende sustituir la atención médica profesional. Sólo ramsey médico puede diagnosticar y tratar un problema de susan.        Amphetamine; Dextroamphetamine tablets  What is this medicine?  AMPHETAMINE; DEXTROAMPHETAMINE(am FET a meen; dex troe am FET a meen) is used to treat attention-deficit hyperactivity disorder (ADHD). It may also be used for narcolepsy. Federal law prohibits giving this medicine to any person other than the person for whom it was prescribed. Do not share this medicine with anyone else.  How should I use this medicine?  Take this medicine by mouth with a glass of water. Follow the directions on the prescription label. Take your doses at regular intervals. Do not take your medicine more often than directed. Do not suddenly stop your medicine. You must gradually reduce the dose or you may feel withdrawal effects. Ask your doctor or health care professional for advice.  Talk to your pediatrician regarding the use of this medicine in children. Special care may be needed. While this drug may be prescribed for children as young as 3 years for selected conditions, precautions do apply.  What side effects may I notice from receiving this medicine?  Side effects that you should report to your doctor or health care professional as soon as possible:  · allergic reactions like skin rash, itching or hives, swelling of the face, lips, or tongue  · changes in vision  · chest pain or chest tightness  · confusion, trouble speaking or understanding  · fast, irregular  heartbeat  · fingers or toes feel numb, cool, painful  · hallucination, loss of contact with reality  · high blood pressure  · males: prolonged or painful erection  · seizures  · severe headaches  · shortness of breath  · suicidal thoughts or other mood changes  · trouble walking, dizziness, loss of balance or coordination  · uncontrollable head, mouth, neck, arm, or leg movements  Side effects that usually do not require medical attention (report to your doctor or health care professional if they continue or are bothersome):  · anxious  · headache  · loss of appetite  · nausea, vomiting  · trouble sleeping  · weight loss  What may interact with this medicine?  Do not take this medicine with any of the following medications:  · MAOIS like Carbex, Eldepryl, Marplan, Nardil, and Parnate  · other stimulant medicines for attention disorders, weight loss, or to stay awake  This medicine may also interact with the following medications:  · acetazolamide  · ammonium chloride  · antacids  · ascorbic acid  · atomoxetine  · caffeine  · certain medicines for blood pressure  · certain medicines for depression, anxiety, or psychotic disturbances  · certain medicines for seizures like carbamazepine, phenobarbital, phenytoin  · certain medicines for stomach problems like cimetidine, famotidine, omeprazole, lansoprazole  · cold or allergy medicines  · glutamic acid  · lithium  · meperidine  · methenamine; sodium acid phosphate  · narcotic medicines for pain  · norepinephrine  · phenothiazines like chlorpromazine, mesoridazine, prochlorperazine, thioridazine  · sodium acid phosphate  · sodium bicarbonate  What if I miss a dose?  If you miss a dose, take it as soon as you can. If it is almost time for your next dose, take only that dose. Do not take double or extra doses.  Where should I keep my medicine?  Keep out of the reach of children. This medicine can be abused. Keep your medicine in a safe place to protect it from theft. Do  not share this medicine with anyone. Selling or giving away this medicine is dangerous and against the law.  Store at room temperature between 15 and 30 degrees C (59 and 86 degrees F). Keep container tightly closed. Throw away any unused medicine after the expiration date. Dispose of properly. This medicine may cause accidental overdose and death if it is taken by other adults, children, or pets. Mix any unused medicine with a substance like cat litter or coffee grounds. Then throw the medicine away in a sealed container like a sealed bag or a coffee can with a lid. Do not use the medicine after the expiration date.  What should I tell my health care provider before I take this medicine?  They need to know if you have any of these conditions:  · anxiety or panic attacks  · circulation problems in fingers and toes  · glaucoma  · hardening or blockages of the arteries or heart blood vessels  · heart disease or a heart defect  · high blood pressure  · history of a drug or alcohol abuse problem  · history of stroke  · kidney disease  · liver disease  · mental illness  · seizures  · suicidal thoughts, plans, or attempt; a previous suicide attempt by you or a family member  · thyroid disease  · Tourette's syndrome  · an unusual or allergic reaction to dextroamphetamine, other amphetamines, other medicines, foods, dyes, or preservatives  · pregnant or trying to get pregnant  · breast-feeding  What should I watch for while using this medicine?  Visit your doctor or health care professional for regular checks on your progress. This prescription requires that you follow special procedures with your doctor and pharmacy. You will need to have a new written prescription from your doctor every time you need a refill.  This medicine may affect your concentration, or hide signs of tiredness. Until you know how this medicine affects you, do not drive, ride a bicycle, use machinery, or do anything that needs mental alertness.  Tell  your doctor or health care professional if this medicine loses its effects, or if you feel you need to take more than the prescribed amount. Do not change the dosage without talking to your doctor or health care professional.  Decreased appetite is a common side effect when starting this medicine. Eating small, frequent meals or snacks can help. Talk to your doctor if you continue to have poor eating habits. Height and weight growth of a child taking this medicine will be monitored closely.  Do not take this medicine close to bedtime. It may prevent you from sleeping.  If you are going to need surgery, a MRI, CT scan, or other procedure, tell your doctor that you are taking this medicine. You may need to stop taking this medicine before the procedure.  Tell your doctor or healthcare professional right away if you notice unexplained wounds on your fingers and toes while taking this medicine. You should also tell your healthcare provider if you experience numbness or pain, changes in the skin color, or sensitivity to temperature in your fingers or toes.  NOTE:This sheet is a summary. It may not cover all possible information. If you have questions about this medicine, talk to your doctor, pharmacist, or health care provider. Copyright© 2017 [employer]

## 2021-03-23 ENCOUNTER — PATIENT MESSAGE (OUTPATIENT)
Dept: PSYCHIATRY | Facility: CLINIC | Age: 41
End: 2021-03-23

## 2021-03-24 ENCOUNTER — OFFICE VISIT (OUTPATIENT)
Dept: PSYCHIATRY | Facility: CLINIC | Age: 41
End: 2021-03-24
Payer: COMMERCIAL

## 2021-03-24 DIAGNOSIS — F41.1 GAD (GENERALIZED ANXIETY DISORDER): Primary | ICD-10-CM

## 2021-03-24 DIAGNOSIS — F90.0 ATTENTION DEFICIT HYPERACTIVITY DISORDER (ADHD), PREDOMINANTLY INATTENTIVE TYPE: ICD-10-CM

## 2021-03-24 DIAGNOSIS — F33.42 MDD (MAJOR DEPRESSIVE DISORDER), RECURRENT, IN FULL REMISSION: ICD-10-CM

## 2021-03-24 PROCEDURE — 99214 OFFICE O/P EST MOD 30 MIN: CPT | Mod: 95,,, | Performed by: NURSE PRACTITIONER

## 2021-03-24 PROCEDURE — 99214 PR OFFICE/OUTPT VISIT, EST, LEVL IV, 30-39 MIN: ICD-10-PCS | Mod: 95,,, | Performed by: NURSE PRACTITIONER

## 2021-03-24 RX ORDER — DEXTROAMPHETAMINE SACCHARATE, AMPHETAMINE ASPARTATE, DEXTROAMPHETAMINE SULFATE AND AMPHETAMINE SULFATE 2.5; 2.5; 2.5; 2.5 MG/1; MG/1; MG/1; MG/1
10 TABLET ORAL 2 TIMES DAILY
Qty: 60 TABLET | Refills: 0 | Status: SHIPPED | OUTPATIENT
Start: 2021-05-20 | End: 2021-07-28 | Stop reason: SDUPTHER

## 2021-03-24 RX ORDER — DEXTROAMPHETAMINE SACCHARATE, AMPHETAMINE ASPARTATE, DEXTROAMPHETAMINE SULFATE AND AMPHETAMINE SULFATE 5; 5; 5; 5 MG/1; MG/1; MG/1; MG/1
1 TABLET ORAL 2 TIMES DAILY
Qty: 60 TABLET | Refills: 0 | Status: SHIPPED | OUTPATIENT
Start: 2021-04-22 | End: 2021-05-22

## 2021-03-24 RX ORDER — DEXTROAMPHETAMINE SACCHARATE, AMPHETAMINE ASPARTATE, DEXTROAMPHETAMINE SULFATE AND AMPHETAMINE SULFATE 2.5; 2.5; 2.5; 2.5 MG/1; MG/1; MG/1; MG/1
10 TABLET ORAL 2 TIMES DAILY
Qty: 60 TABLET | Refills: 0 | Status: SHIPPED | OUTPATIENT
Start: 2021-03-24 | End: 2021-04-23

## 2021-03-24 RX ORDER — FLUOXETINE HYDROCHLORIDE 20 MG/1
CAPSULE ORAL
Qty: 30 CAPSULE | Refills: 2 | Status: SHIPPED | OUTPATIENT
Start: 2021-03-24 | End: 2021-07-28 | Stop reason: SDUPTHER

## 2021-04-26 ENCOUNTER — PATIENT MESSAGE (OUTPATIENT)
Dept: RESEARCH | Facility: HOSPITAL | Age: 41
End: 2021-04-26

## 2021-07-15 ENCOUNTER — PATIENT MESSAGE (OUTPATIENT)
Dept: OBSTETRICS AND GYNECOLOGY | Facility: CLINIC | Age: 41
End: 2021-07-15

## 2021-07-15 ENCOUNTER — PATIENT MESSAGE (OUTPATIENT)
Dept: PSYCHIATRY | Facility: CLINIC | Age: 41
End: 2021-07-15

## 2021-07-15 RX ORDER — ALPRAZOLAM 0.25 MG/1
0.25 TABLET ORAL DAILY PRN
Qty: 15 TABLET | Refills: 0 | Status: SHIPPED | OUTPATIENT
Start: 2021-07-15 | End: 2022-01-11 | Stop reason: SDUPTHER

## 2021-07-28 ENCOUNTER — OFFICE VISIT (OUTPATIENT)
Dept: PSYCHIATRY | Facility: CLINIC | Age: 41
End: 2021-07-28
Payer: COMMERCIAL

## 2021-07-28 ENCOUNTER — TELEPHONE (OUTPATIENT)
Dept: PSYCHIATRY | Facility: CLINIC | Age: 41
End: 2021-07-28

## 2021-07-28 DIAGNOSIS — F41.1 GAD (GENERALIZED ANXIETY DISORDER): ICD-10-CM

## 2021-07-28 DIAGNOSIS — F90.0 ATTENTION DEFICIT HYPERACTIVITY DISORDER (ADHD), PREDOMINANTLY INATTENTIVE TYPE: Primary | ICD-10-CM

## 2021-07-28 DIAGNOSIS — F33.1 MODERATE EPISODE OF RECURRENT MAJOR DEPRESSIVE DISORDER: ICD-10-CM

## 2021-07-28 PROCEDURE — 99214 PR OFFICE/OUTPT VISIT, EST, LEVL IV, 30-39 MIN: ICD-10-PCS | Mod: 95,,, | Performed by: NURSE PRACTITIONER

## 2021-07-28 PROCEDURE — 99214 OFFICE O/P EST MOD 30 MIN: CPT | Mod: 95,,, | Performed by: NURSE PRACTITIONER

## 2021-07-28 RX ORDER — FLUOXETINE HYDROCHLORIDE 20 MG/1
CAPSULE ORAL
Qty: 30 CAPSULE | Refills: 2 | Status: SHIPPED | OUTPATIENT
Start: 2021-07-28 | End: 2021-11-22 | Stop reason: SDUPTHER

## 2021-07-28 RX ORDER — DEXTROAMPHETAMINE SACCHARATE, AMPHETAMINE ASPARTATE, DEXTROAMPHETAMINE SULFATE AND AMPHETAMINE SULFATE 2.5; 2.5; 2.5; 2.5 MG/1; MG/1; MG/1; MG/1
10 TABLET ORAL 2 TIMES DAILY
Qty: 60 TABLET | Refills: 0 | Status: SHIPPED | OUTPATIENT
Start: 2021-09-22 | End: 2021-11-05 | Stop reason: SDUPTHER

## 2021-07-28 RX ORDER — DEXTROAMPHETAMINE SACCHARATE, AMPHETAMINE ASPARTATE, DEXTROAMPHETAMINE SULFATE AND AMPHETAMINE SULFATE 2.5; 2.5; 2.5; 2.5 MG/1; MG/1; MG/1; MG/1
10 TABLET ORAL 2 TIMES DAILY
Qty: 60 TABLET | Refills: 0 | Status: SHIPPED | OUTPATIENT
Start: 2021-07-28 | End: 2021-08-27

## 2021-07-28 RX ORDER — DEXTROAMPHETAMINE SACCHARATE, AMPHETAMINE ASPARTATE, DEXTROAMPHETAMINE SULFATE AND AMPHETAMINE SULFATE 2.5; 2.5; 2.5; 2.5 MG/1; MG/1; MG/1; MG/1
10 TABLET ORAL 2 TIMES DAILY
Qty: 60 TABLET | Refills: 0 | Status: SHIPPED | OUTPATIENT
Start: 2021-08-25 | End: 2021-09-02 | Stop reason: SDUPTHER

## 2021-08-19 ENCOUNTER — OFFICE VISIT (OUTPATIENT)
Dept: OBSTETRICS AND GYNECOLOGY | Facility: CLINIC | Age: 41
End: 2021-08-19
Attending: OBSTETRICS & GYNECOLOGY
Payer: COMMERCIAL

## 2021-08-19 ENCOUNTER — LAB VISIT (OUTPATIENT)
Dept: LAB | Facility: OTHER | Age: 41
End: 2021-08-19
Attending: OBSTETRICS & GYNECOLOGY
Payer: COMMERCIAL

## 2021-08-19 VITALS
DIASTOLIC BLOOD PRESSURE: 64 MMHG | BODY MASS INDEX: 23.39 KG/M2 | HEIGHT: 61 IN | SYSTOLIC BLOOD PRESSURE: 110 MMHG | WEIGHT: 123.88 LBS

## 2021-08-19 DIAGNOSIS — Z23 NEED FOR HPV VACCINATION: ICD-10-CM

## 2021-08-19 DIAGNOSIS — Z01.419 WELL WOMAN EXAM WITH ROUTINE GYNECOLOGICAL EXAM: ICD-10-CM

## 2021-08-19 DIAGNOSIS — Z12.39 SCREENING BREAST EXAMINATION: ICD-10-CM

## 2021-08-19 DIAGNOSIS — Z01.419 WELL WOMAN EXAM WITH ROUTINE GYNECOLOGICAL EXAM: Primary | ICD-10-CM

## 2021-08-19 LAB
25(OH)D3+25(OH)D2 SERPL-MCNC: 26 NG/ML (ref 30–96)
ALBUMIN SERPL BCP-MCNC: 4.4 G/DL (ref 3.5–5.2)
ALP SERPL-CCNC: 64 U/L (ref 55–135)
ALT SERPL W/O P-5'-P-CCNC: 11 U/L (ref 10–44)
ANION GAP SERPL CALC-SCNC: 11 MMOL/L (ref 8–16)
AST SERPL-CCNC: 20 U/L (ref 10–40)
BASOPHILS # BLD AUTO: 0.08 K/UL (ref 0–0.2)
BASOPHILS NFR BLD: 1.2 % (ref 0–1.9)
BILIRUB SERPL-MCNC: 0.6 MG/DL (ref 0.1–1)
BUN SERPL-MCNC: 8 MG/DL (ref 6–20)
CALCIUM SERPL-MCNC: 9.4 MG/DL (ref 8.7–10.5)
CHLORIDE SERPL-SCNC: 102 MMOL/L (ref 95–110)
CO2 SERPL-SCNC: 25 MMOL/L (ref 23–29)
CREAT SERPL-MCNC: 0.8 MG/DL (ref 0.5–1.4)
DIFFERENTIAL METHOD: NORMAL
EOSINOPHIL # BLD AUTO: 0.2 K/UL (ref 0–0.5)
EOSINOPHIL NFR BLD: 3.5 % (ref 0–8)
ERYTHROCYTE [DISTWIDTH] IN BLOOD BY AUTOMATED COUNT: 12.5 % (ref 11.5–14.5)
EST. GFR  (AFRICAN AMERICAN): >60 ML/MIN/1.73 M^2
EST. GFR  (NON AFRICAN AMERICAN): >60 ML/MIN/1.73 M^2
GLUCOSE SERPL-MCNC: 87 MG/DL (ref 70–110)
HCT VFR BLD AUTO: 39.6 % (ref 37–48.5)
HGB BLD-MCNC: 13.1 G/DL (ref 12–16)
IMM GRANULOCYTES # BLD AUTO: 0.03 K/UL (ref 0–0.04)
IMM GRANULOCYTES NFR BLD AUTO: 0.4 % (ref 0–0.5)
LYMPHOCYTES # BLD AUTO: 2.4 K/UL (ref 1–4.8)
LYMPHOCYTES NFR BLD: 34.1 % (ref 18–48)
MCH RBC QN AUTO: 30.2 PG (ref 27–31)
MCHC RBC AUTO-ENTMCNC: 33.1 G/DL (ref 32–36)
MCV RBC AUTO: 91 FL (ref 82–98)
MONOCYTES # BLD AUTO: 0.5 K/UL (ref 0.3–1)
MONOCYTES NFR BLD: 7.2 % (ref 4–15)
NEUTROPHILS # BLD AUTO: 3.7 K/UL (ref 1.8–7.7)
NEUTROPHILS NFR BLD: 53.6 % (ref 38–73)
NRBC BLD-RTO: 0 /100 WBC
PLATELET # BLD AUTO: 200 K/UL (ref 150–450)
PMV BLD AUTO: 11.7 FL (ref 9.2–12.9)
POTASSIUM SERPL-SCNC: 3.9 MMOL/L (ref 3.5–5.1)
PROT SERPL-MCNC: 7.6 G/DL (ref 6–8.4)
RBC # BLD AUTO: 4.34 M/UL (ref 4–5.4)
SODIUM SERPL-SCNC: 138 MMOL/L (ref 136–145)
T4 FREE SERPL-MCNC: 0.84 NG/DL (ref 0.71–1.51)
TSH SERPL DL<=0.005 MIU/L-ACNC: 0.81 UIU/ML (ref 0.4–4)
WBC # BLD AUTO: 6.95 K/UL (ref 3.9–12.7)

## 2021-08-19 PROCEDURE — 3078F DIAST BP <80 MM HG: CPT | Mod: CPTII,S$GLB,, | Performed by: OBSTETRICS & GYNECOLOGY

## 2021-08-19 PROCEDURE — 99386 PR PREVENTIVE VISIT,NEW,40-64: ICD-10-PCS | Mod: S$GLB,,, | Performed by: OBSTETRICS & GYNECOLOGY

## 2021-08-19 PROCEDURE — 80053 COMPREHEN METABOLIC PANEL: CPT | Performed by: OBSTETRICS & GYNECOLOGY

## 2021-08-19 PROCEDURE — 99999 PR PBB SHADOW E&M-EST. PATIENT-LVL III: CPT | Mod: PBBFAC,,, | Performed by: OBSTETRICS & GYNECOLOGY

## 2021-08-19 PROCEDURE — 1159F PR MEDICATION LIST DOCUMENTED IN MEDICAL RECORD: ICD-10-PCS | Mod: CPTII,S$GLB,, | Performed by: OBSTETRICS & GYNECOLOGY

## 2021-08-19 PROCEDURE — 99999 PR PBB SHADOW E&M-EST. PATIENT-LVL III: ICD-10-PCS | Mod: PBBFAC,,, | Performed by: OBSTETRICS & GYNECOLOGY

## 2021-08-19 PROCEDURE — 84443 ASSAY THYROID STIM HORMONE: CPT | Performed by: OBSTETRICS & GYNECOLOGY

## 2021-08-19 PROCEDURE — 87624 HPV HI-RISK TYP POOLED RSLT: CPT | Performed by: OBSTETRICS & GYNECOLOGY

## 2021-08-19 PROCEDURE — 85025 COMPLETE CBC W/AUTO DIFF WBC: CPT | Performed by: OBSTETRICS & GYNECOLOGY

## 2021-08-19 PROCEDURE — 99386 PREV VISIT NEW AGE 40-64: CPT | Mod: S$GLB,,, | Performed by: OBSTETRICS & GYNECOLOGY

## 2021-08-19 PROCEDURE — 84439 ASSAY OF FREE THYROXINE: CPT | Performed by: OBSTETRICS & GYNECOLOGY

## 2021-08-19 PROCEDURE — 3074F PR MOST RECENT SYSTOLIC BLOOD PRESSURE < 130 MM HG: ICD-10-PCS | Mod: CPTII,S$GLB,, | Performed by: OBSTETRICS & GYNECOLOGY

## 2021-08-19 PROCEDURE — 1159F MED LIST DOCD IN RCRD: CPT | Mod: CPTII,S$GLB,, | Performed by: OBSTETRICS & GYNECOLOGY

## 2021-08-19 PROCEDURE — 88175 CYTOPATH C/V AUTO FLUID REDO: CPT | Performed by: OBSTETRICS & GYNECOLOGY

## 2021-08-19 PROCEDURE — 3074F SYST BP LT 130 MM HG: CPT | Mod: CPTII,S$GLB,, | Performed by: OBSTETRICS & GYNECOLOGY

## 2021-08-19 PROCEDURE — 3008F BODY MASS INDEX DOCD: CPT | Mod: CPTII,S$GLB,, | Performed by: OBSTETRICS & GYNECOLOGY

## 2021-08-19 PROCEDURE — 36415 COLL VENOUS BLD VENIPUNCTURE: CPT | Performed by: OBSTETRICS & GYNECOLOGY

## 2021-08-19 PROCEDURE — 3044F HG A1C LEVEL LT 7.0%: CPT | Mod: CPTII,S$GLB,, | Performed by: OBSTETRICS & GYNECOLOGY

## 2021-08-19 PROCEDURE — 3044F PR MOST RECENT HEMOGLOBIN A1C LEVEL <7.0%: ICD-10-PCS | Mod: CPTII,S$GLB,, | Performed by: OBSTETRICS & GYNECOLOGY

## 2021-08-19 PROCEDURE — 82306 VITAMIN D 25 HYDROXY: CPT | Performed by: OBSTETRICS & GYNECOLOGY

## 2021-08-19 PROCEDURE — 3078F PR MOST RECENT DIASTOLIC BLOOD PRESSURE < 80 MM HG: ICD-10-PCS | Mod: CPTII,S$GLB,, | Performed by: OBSTETRICS & GYNECOLOGY

## 2021-08-19 PROCEDURE — 83036 HEMOGLOBIN GLYCOSYLATED A1C: CPT | Performed by: OBSTETRICS & GYNECOLOGY

## 2021-08-19 PROCEDURE — 3008F PR BODY MASS INDEX (BMI) DOCUMENTED: ICD-10-PCS | Mod: CPTII,S$GLB,, | Performed by: OBSTETRICS & GYNECOLOGY

## 2021-08-20 LAB
ESTIMATED AVG GLUCOSE: 94 MG/DL (ref 68–131)
HBA1C MFR BLD: 4.9 % (ref 4–5.6)

## 2021-08-27 LAB
HPV HR 12 DNA SPEC QL NAA+PROBE: NEGATIVE
HPV16 AG SPEC QL: NEGATIVE
HPV18 DNA SPEC QL NAA+PROBE: NEGATIVE

## 2021-09-01 ENCOUNTER — PATIENT MESSAGE (OUTPATIENT)
Dept: PSYCHIATRY | Facility: CLINIC | Age: 41
End: 2021-09-01

## 2021-09-02 DIAGNOSIS — F90.0 ATTENTION DEFICIT HYPERACTIVITY DISORDER (ADHD), PREDOMINANTLY INATTENTIVE TYPE: ICD-10-CM

## 2021-09-02 RX ORDER — DEXTROAMPHETAMINE SACCHARATE, AMPHETAMINE ASPARTATE, DEXTROAMPHETAMINE SULFATE AND AMPHETAMINE SULFATE 2.5; 2.5; 2.5; 2.5 MG/1; MG/1; MG/1; MG/1
10 TABLET ORAL 2 TIMES DAILY
Qty: 60 TABLET | Refills: 0 | Status: SHIPPED | OUTPATIENT
Start: 2021-09-02 | End: 2021-10-02

## 2021-09-09 LAB
FINAL PATHOLOGIC DIAGNOSIS: NORMAL
Lab: NORMAL

## 2021-11-05 ENCOUNTER — PATIENT MESSAGE (OUTPATIENT)
Dept: PSYCHIATRY | Facility: CLINIC | Age: 41
End: 2021-11-05
Payer: COMMERCIAL

## 2021-11-05 DIAGNOSIS — F90.0 ATTENTION DEFICIT HYPERACTIVITY DISORDER (ADHD), PREDOMINANTLY INATTENTIVE TYPE: ICD-10-CM

## 2021-11-05 RX ORDER — DEXTROAMPHETAMINE SACCHARATE, AMPHETAMINE ASPARTATE, DEXTROAMPHETAMINE SULFATE AND AMPHETAMINE SULFATE 2.5; 2.5; 2.5; 2.5 MG/1; MG/1; MG/1; MG/1
10 TABLET ORAL 2 TIMES DAILY
Qty: 60 TABLET | Refills: 0 | Status: SHIPPED | OUTPATIENT
Start: 2021-11-05 | End: 2021-11-08 | Stop reason: SDUPTHER

## 2021-11-08 ENCOUNTER — PATIENT MESSAGE (OUTPATIENT)
Dept: PSYCHIATRY | Facility: CLINIC | Age: 41
End: 2021-11-08
Payer: COMMERCIAL

## 2021-11-08 DIAGNOSIS — F90.0 ATTENTION DEFICIT HYPERACTIVITY DISORDER (ADHD), PREDOMINANTLY INATTENTIVE TYPE: ICD-10-CM

## 2021-11-08 RX ORDER — DEXTROAMPHETAMINE SACCHARATE, AMPHETAMINE ASPARTATE, DEXTROAMPHETAMINE SULFATE AND AMPHETAMINE SULFATE 2.5; 2.5; 2.5; 2.5 MG/1; MG/1; MG/1; MG/1
10 TABLET ORAL 2 TIMES DAILY
Qty: 60 TABLET | Refills: 0 | Status: SHIPPED | OUTPATIENT
Start: 2021-11-08 | End: 2021-11-22 | Stop reason: SDUPTHER

## 2021-11-22 ENCOUNTER — OFFICE VISIT (OUTPATIENT)
Dept: PSYCHIATRY | Facility: CLINIC | Age: 41
End: 2021-11-22
Payer: COMMERCIAL

## 2021-11-22 DIAGNOSIS — F33.42 MDD (MAJOR DEPRESSIVE DISORDER), RECURRENT, IN FULL REMISSION: ICD-10-CM

## 2021-11-22 DIAGNOSIS — F41.1 GAD (GENERALIZED ANXIETY DISORDER): ICD-10-CM

## 2021-11-22 DIAGNOSIS — F90.0 ATTENTION DEFICIT HYPERACTIVITY DISORDER (ADHD), PREDOMINANTLY INATTENTIVE TYPE: Primary | ICD-10-CM

## 2021-11-22 PROCEDURE — 99214 OFFICE O/P EST MOD 30 MIN: CPT | Mod: 95,,, | Performed by: NURSE PRACTITIONER

## 2021-11-22 PROCEDURE — 99214 PR OFFICE/OUTPT VISIT, EST, LEVL IV, 30-39 MIN: ICD-10-PCS | Mod: 95,,, | Performed by: NURSE PRACTITIONER

## 2021-11-22 RX ORDER — DEXTROAMPHETAMINE SACCHARATE, AMPHETAMINE ASPARTATE, DEXTROAMPHETAMINE SULFATE AND AMPHETAMINE SULFATE 2.5; 2.5; 2.5; 2.5 MG/1; MG/1; MG/1; MG/1
10 TABLET ORAL 2 TIMES DAILY
Qty: 60 TABLET | Refills: 0 | Status: SHIPPED | OUTPATIENT
Start: 2022-02-01 | End: 2022-02-03 | Stop reason: SDUPTHER

## 2021-11-22 RX ORDER — DEXTROAMPHETAMINE SACCHARATE, AMPHETAMINE ASPARTATE, DEXTROAMPHETAMINE SULFATE AND AMPHETAMINE SULFATE 2.5; 2.5; 2.5; 2.5 MG/1; MG/1; MG/1; MG/1
10 TABLET ORAL 2 TIMES DAILY
Qty: 60 TABLET | Refills: 0 | Status: SHIPPED | OUTPATIENT
Start: 2022-01-03 | End: 2022-02-03 | Stop reason: SDUPTHER

## 2021-11-22 RX ORDER — FLUOXETINE HYDROCHLORIDE 20 MG/1
CAPSULE ORAL
Qty: 30 CAPSULE | Refills: 2 | Status: SHIPPED | OUTPATIENT
Start: 2021-11-22 | End: 2022-06-20 | Stop reason: SDUPTHER

## 2022-01-02 ENCOUNTER — OFFICE VISIT (OUTPATIENT)
Dept: URGENT CARE | Facility: CLINIC | Age: 42
End: 2022-01-02
Payer: COMMERCIAL

## 2022-01-02 VITALS
DIASTOLIC BLOOD PRESSURE: 73 MMHG | OXYGEN SATURATION: 98 % | HEIGHT: 61 IN | TEMPERATURE: 98 F | HEART RATE: 65 BPM | SYSTOLIC BLOOD PRESSURE: 89 MMHG | WEIGHT: 123 LBS | RESPIRATION RATE: 18 BRPM | BODY MASS INDEX: 23.22 KG/M2

## 2022-01-02 DIAGNOSIS — R05.9 COUGH: ICD-10-CM

## 2022-01-02 DIAGNOSIS — Z20.822 EXPOSURE TO COVID-19 VIRUS: Primary | ICD-10-CM

## 2022-01-02 LAB
CTP QC/QA: YES
SARS-COV-2 RDRP RESP QL NAA+PROBE: NEGATIVE

## 2022-01-02 PROCEDURE — 3074F SYST BP LT 130 MM HG: CPT | Mod: CPTII,S$GLB,, | Performed by: NURSE PRACTITIONER

## 2022-01-02 PROCEDURE — 1159F MED LIST DOCD IN RCRD: CPT | Mod: CPTII,S$GLB,, | Performed by: NURSE PRACTITIONER

## 2022-01-02 PROCEDURE — 3078F DIAST BP <80 MM HG: CPT | Mod: CPTII,S$GLB,, | Performed by: NURSE PRACTITIONER

## 2022-01-02 PROCEDURE — 3074F PR MOST RECENT SYSTOLIC BLOOD PRESSURE < 130 MM HG: ICD-10-PCS | Mod: CPTII,S$GLB,, | Performed by: NURSE PRACTITIONER

## 2022-01-02 PROCEDURE — 3008F BODY MASS INDEX DOCD: CPT | Mod: CPTII,S$GLB,, | Performed by: NURSE PRACTITIONER

## 2022-01-02 PROCEDURE — 99213 OFFICE O/P EST LOW 20 MIN: CPT | Mod: S$GLB,,, | Performed by: NURSE PRACTITIONER

## 2022-01-02 PROCEDURE — U0002: ICD-10-PCS | Mod: QW,S$GLB,, | Performed by: NURSE PRACTITIONER

## 2022-01-02 PROCEDURE — 99213 PR OFFICE/OUTPT VISIT, EST, LEVL III, 20-29 MIN: ICD-10-PCS | Mod: S$GLB,,, | Performed by: NURSE PRACTITIONER

## 2022-01-02 PROCEDURE — 1160F RVW MEDS BY RX/DR IN RCRD: CPT | Mod: CPTII,S$GLB,, | Performed by: NURSE PRACTITIONER

## 2022-01-02 PROCEDURE — 1159F PR MEDICATION LIST DOCUMENTED IN MEDICAL RECORD: ICD-10-PCS | Mod: CPTII,S$GLB,, | Performed by: NURSE PRACTITIONER

## 2022-01-02 PROCEDURE — 1160F PR REVIEW ALL MEDS BY PRESCRIBER/CLIN PHARMACIST DOCUMENTED: ICD-10-PCS | Mod: CPTII,S$GLB,, | Performed by: NURSE PRACTITIONER

## 2022-01-02 PROCEDURE — U0002 COVID-19 LAB TEST NON-CDC: HCPCS | Mod: QW,S$GLB,, | Performed by: NURSE PRACTITIONER

## 2022-01-02 PROCEDURE — 3078F PR MOST RECENT DIASTOLIC BLOOD PRESSURE < 80 MM HG: ICD-10-PCS | Mod: CPTII,S$GLB,, | Performed by: NURSE PRACTITIONER

## 2022-01-02 PROCEDURE — 3008F PR BODY MASS INDEX (BMI) DOCUMENTED: ICD-10-PCS | Mod: CPTII,S$GLB,, | Performed by: NURSE PRACTITIONER

## 2022-01-02 NOTE — PATIENT INSTRUCTIONS
Return to Urgent Care or go to ER if symptoms worsen or fail to improve.  Follow up with PCP as recommended for further management.       Patient Education       Cough Discharge Instructions, Adult   About this topic   Many things can cause a cough. A cold or allergies can cause a cough. Other times, asthma or smoking can cause your cough. You can have a cough from mucus that drips down the back of your throat or from stomach acid that backs up into your throat. Sometimes a cough is a side effect from a drug. You may be waiting on some test results. If so, the staff will contact you if there are concerning results.  What care is needed at home?   · Ask your doctor what you need to do when you go home. Make sure you ask questions if you do not understand what the doctor says.  · To help you feel better:  ? Use a cool mist humidifier to avoid breathing dry air.  ? Use hard candy or cough drops to soothe sore throat and cough.  ? Gargle with salt water (mix 1/2 teaspoon salt with 1 cup warm water) a few times a day.  ? Spray saltwater mist in each nostril. Any normal saline spray works.  ? Sip warm liquids to keep your throat moist.  ? Take warm, steamy showers to help soothe the cough.  · Do not smoke or be in smoke-filled places. Avoid things that may cause breathing problems like vaping, fumes, pollution, dust, and other common allergens.  · You may want to use over-the-counter medicines for allergies or acid reflux if your cough is due to one of these problems.  · You can also use an over-the-counter cough medicine.  What follow-up care is needed?   Your doctor may ask you to make visits to the office to check on your progress. Be sure to keep these visits.  What drugs may be needed?   The doctor may order drugs to:  · Control coughing  · Get rid of or thin mucus  · Block allergens if your cough is due to allergies  · Increase airflow if your cough is due to asthma or COPD  · Reduce swelling of the  airways  · Reduce stomach acid if your cough is due to stomach acid problems  Will physical activity be limited?   Your cough may interfere with some of your activities.  What changes to diet are needed?   Some people feel milk products worsen their sputum production and worsen their cough. There is no proof that this is true. There is no need to reduce milk intake. Honey has been shown to be as effective as the leading over-the-counter (OTC) drug for calming coughs. Use 1/2 to 1 teaspoon (2.5 mL to 5 mL) of honey as needed. It can thin the secretions and loosen the cough. Never give honey to a child under the age of 1.  What can be done to prevent this health problem?   · Wash your hands often with soap and water for at least 20 seconds, especially after coughing or sneezing. Alcohol-based hand sanitizers also work to kill the virus.       · If you are sick, cover your mouth and nose with tissue when you cough or sneeze. You can also cough into your elbow. Throw away tissues in the trash and wash your hands after touching used tissues.  · Clean items and surfaces you most often touch like door handles, remotes, phones, or toys. Wipe them with a disinfectant. This can help reduce the spread of infection.  · Do not get too close (kissing, hugging) to people who are sick.  · Do not share towels or hankies with anyone who is sick.  · Stay away from crowded places.  · Get a flu shot each year.     When do I need to call the doctor?   · You have chest pain when you cough or trouble breathing.  · You start to cough up blood or yellow or green mucus.  · You have a fever of 100.4°F (38°C) or higher or chills.  · You cough so hard you throw up.  · You are still coughing in 10 days.  Teach Back: Helping You Understand   The Teach Back Method helps you understand the information we are giving you. After you talk with the staff, tell them in your own words what you learned. This helps to make sure the staff has described each  thing clearly. It also helps to explain things that may have been confusing. Before going home, make sure you can do these:  · I can tell you about my condition.  · I can tell you what I can do to help thin the mucus and ease my cough.  · I can tell you what I will do if I have wheezing, chest tightness, my lips turn blue with coughing, or I have other trouble breathing.  Where can I learn more?   [employer]  https://familydoctor.org/cough-medicine-understanding-your-otc-options/   [facility] Choices  https://www.nhs.uk/conditions/cough/   Last Reviewed Date   2021-06-10  Consumer Information Use and Disclaimer   This information is not specific medical advice and does not replace information you receive from your health care provider. This is only a brief summary of general information. It does NOT include all information about conditions, illnesses, injuries, tests, procedures, treatments, therapies, discharge instructions or life-style choices that may apply to you. You must talk with your health care provider for complete information about your health and treatment options. This information should not be used to decide whether or not to accept your health care providers advice, instructions or recommendations. Only your health care provider has the knowledge and training to provide advice that is right for you.  Copyright   Copyright © 2021 [employer] and its affiliates and/or licensors. All rights reserved.

## 2022-01-02 NOTE — PROGRESS NOTES
"COVSubjective:       Patient ID: Stormy Carlisle is a 41 y.o. female.    Vitals:  height is 5' 1" (1.549 m) and weight is 55.8 kg (123 lb). Her temperature is 97.9 °F (36.6 °C). Her blood pressure is 89/73 (abnormal) and her pulse is 65. Her respiration is 18 and oxygen saturation is 98%.     Chief Complaint: Cough    PT STATES HER SPOUSE DID A HOME SELF TESTED POSITIVE X2 DAYS AGO. FULLY COVID VACCINATED + BOOSTER.    Cough  This is a new problem. The current episode started in the past 7 days (X2 DAYS). The problem has been unchanged. The problem occurs every few hours. The cough is non-productive. Associated symptoms include a sore throat. Pertinent negatives include no chills. Associated symptoms comments: BODYACHES. Nothing aggravates the symptoms. She has tried nothing for the symptoms.       Constitution: Positive for fatigue. Negative for chills.   HENT: Positive for sore throat.    Respiratory: Positive for cough.    Gastrointestinal: Positive for nausea.       Objective:      Physical Exam   Constitutional: She is oriented to person, place, and time. She appears well-developed. She is cooperative.  Non-toxic appearance. She does not appear ill. No distress.      Comments:ambulatory   HENT:   Nose: Mucosal edema present. No purulent discharge. Right sinus exhibits no maxillary sinus tenderness and no frontal sinus tenderness. Left sinus exhibits no maxillary sinus tenderness and no frontal sinus tenderness.   Mouth/Throat: Uvula is midline and mucous membranes are normal. No uvula swelling. No tonsillar exudate.   Pulmonary/Chest: Effort normal. No respiratory distress.   Neurological: She is alert and oriented to person, place, and time.   Skin: Skin is warm, dry and not diaphoretic.   Psychiatric: Her behavior is normal.   Nursing note and vitals reviewed.          Results for orders placed or performed in visit on 01/02/22   POCT COVID-19 Rapid Screening   Result Value Ref Range    POC Rapid COVID Negative " Negative     Acceptable Yes        Assessment:       1. Exposure to COVID-19 virus    2. Cough          Plan:         Exposure to COVID-19 virus    Cough  -     POCT COVID-19 Rapid Screening

## 2022-01-11 ENCOUNTER — PATIENT MESSAGE (OUTPATIENT)
Dept: PSYCHIATRY | Facility: CLINIC | Age: 42
End: 2022-01-11
Payer: COMMERCIAL

## 2022-01-11 RX ORDER — ALPRAZOLAM 0.25 MG/1
0.25 TABLET ORAL DAILY PRN
Qty: 15 TABLET | Refills: 0 | Status: SHIPPED | OUTPATIENT
Start: 2022-01-11 | End: 2023-08-30 | Stop reason: SDUPTHER

## 2022-02-03 ENCOUNTER — OFFICE VISIT (OUTPATIENT)
Dept: PSYCHIATRY | Facility: CLINIC | Age: 42
End: 2022-02-03
Payer: COMMERCIAL

## 2022-02-03 DIAGNOSIS — F33.1 MODERATE EPISODE OF RECURRENT MAJOR DEPRESSIVE DISORDER: Primary | ICD-10-CM

## 2022-02-03 DIAGNOSIS — F90.0 ATTENTION DEFICIT HYPERACTIVITY DISORDER (ADHD), PREDOMINANTLY INATTENTIVE TYPE: ICD-10-CM

## 2022-02-03 DIAGNOSIS — F41.1 GAD (GENERALIZED ANXIETY DISORDER): ICD-10-CM

## 2022-02-03 PROCEDURE — 99214 PR OFFICE/OUTPT VISIT, EST, LEVL IV, 30-39 MIN: ICD-10-PCS | Mod: 95,,, | Performed by: NURSE PRACTITIONER

## 2022-02-03 PROCEDURE — 99214 OFFICE O/P EST MOD 30 MIN: CPT | Mod: 95,,, | Performed by: NURSE PRACTITIONER

## 2022-02-03 RX ORDER — DEXTROAMPHETAMINE SACCHARATE, AMPHETAMINE ASPARTATE, DEXTROAMPHETAMINE SULFATE AND AMPHETAMINE SULFATE 2.5; 2.5; 2.5; 2.5 MG/1; MG/1; MG/1; MG/1
10 TABLET ORAL 2 TIMES DAILY
Qty: 60 TABLET | Refills: 0 | Status: SHIPPED | OUTPATIENT
Start: 2022-03-18 | End: 2022-03-25 | Stop reason: SDUPTHER

## 2022-02-03 RX ORDER — DEXTROAMPHETAMINE SACCHARATE, AMPHETAMINE ASPARTATE, DEXTROAMPHETAMINE SULFATE AND AMPHETAMINE SULFATE 2.5; 2.5; 2.5; 2.5 MG/1; MG/1; MG/1; MG/1
10 TABLET ORAL 2 TIMES DAILY
Qty: 60 TABLET | Refills: 0 | Status: SHIPPED | OUTPATIENT
Start: 2022-04-15 | End: 2022-03-25 | Stop reason: SDUPTHER

## 2022-02-03 RX ORDER — DEXTROAMPHETAMINE SACCHARATE, AMPHETAMINE ASPARTATE, DEXTROAMPHETAMINE SULFATE AND AMPHETAMINE SULFATE 2.5; 2.5; 2.5; 2.5 MG/1; MG/1; MG/1; MG/1
10 TABLET ORAL 2 TIMES DAILY
Qty: 60 TABLET | Refills: 0 | Status: SHIPPED | OUTPATIENT
Start: 2022-02-18 | End: 2022-03-20

## 2022-02-03 NOTE — PATIENT INSTRUCTIONS
Patient Education       Alprazolam (al RAZ melchor husain)   Brand Names: US ALPRAZolam Intensol; ALPRAZolam XR; Xanax; Xanax XR   Brand Names: Germain ALPRAZolam TS; ALPRAZolam-1; APO-Alpraz; APO-Alpraz TS; JAMP-Alprazolam [DSC]; MYLAN-ALPRAZolam [DSC]; SKINNY-ALPRAZolam [DSC]; RENE-ALPRAZolam [DSC]; TEVA-Alprazolam; Xanax; Xanax TS   Warning   · This drug is a benzodiazepine. The use of a benzodiazepine drug along with opioid drugs has led to very bad side effects. Side effects that have happened include slowed or trouble breathing and death. Opioid drugs include drugs like codeine, oxycodone, and morphine. Opioid drugs are used to treat pain and some are used to treat cough. Talk with the doctor.  · If you are taking this drug with an opioid drug, get medical help right away if you feel very sleepy or dizzy; if you have slow, shallow, or trouble breathing; or if you pass out. Caregivers or others need to get medical help right away if the patient does not respond, does not answer or react like normal, or will not wake up.  · Benzodiazepines can put you at risk for addiction, abuse, and misuse. Misuse or abuse of this drug can lead to overdose or death, especially when used along with certain other drugs, alcohol, or street drugs. Addiction can happen even if you take this drug as your doctor has told you. Get medical help right away if you have changes in mood or behavior, suicidal thoughts or actions, seizures, or trouble breathing.  · You will be watched closely to make sure you do not misuse, abuse, or become addicted to this drug.  · Benzodiazepines may cause dependence. Lowering the dose or stopping this drug all of a sudden may cause withdrawal. This can be life-threatening. The risk of dependence and withdrawal are raised the longer you take this drug and the higher the dose. Talk to your doctor before you lower the dose or stop this drug. You will need to follow your doctor's instructions. Get medical help right  away if you have trouble controlling body movements, seizures, new or worse behavior or mood changes like depression or thoughts of suicide, thoughts of harming someone, hallucinations (seeing or hearing things that are not there), losing contact with reality, moving around or talking a lot, or any other bad effects.  · Sometimes, withdrawal signs can last for several weeks to more than 12 months. Tell your doctor if you have anxiety; trouble with memory, learning, or focusing; trouble sleeping; burning, numbness, or tingling; weakness; shaking; muscle twitching; ringing in the ears; or any other bad effects.    What is this drug used for?   · It is used to treat anxiety.  · It is used to treat panic attacks.    What do I need to tell my doctor BEFORE I take this drug?   · If you are allergic to this drug; any part of this drug; or any other drugs, foods, or substances. Tell your doctor about the allergy and what signs you had.  · If you have glaucoma.  · If you are taking any of these drugs: Clarithromycin, itraconazole, or ketoconazole. There may be other drugs that must not be taken with this drug. Your doctor or pharmacist can tell you if you are taking a drug that must not be taken with this drug.  · If you are breast-feeding. Do not breast-feed while you take this drug.  This is not a list of all drugs or health problems that interact with this drug.  Tell your doctor and pharmacist about all of your drugs (prescription or OTC, natural products, vitamins) and health problems. You must check to make sure that it is safe for you to take this drug with all of your drugs and health problems. Do not start, stop, or change the dose of any drug without checking with your doctor.  What are some things I need to know or do while I take this drug?   · Tell all of your health care providers that you take this drug. This includes your doctors, nurses, pharmacists, and dentists.  · If you have been taking this drug for a  long time or at high doses, it may not work as well and you may need higher doses to get the same effect. This is known as tolerance. Call your doctor if this drug stops working well. Do not take more than ordered.  · Avoid driving and doing other tasks or actions that call for you to be alert until you see how this drug affects you.  · Avoid drinking alcohol while taking this drug.  · Talk with your doctor before you use marijuana, other forms of cannabis, or prescription or OTC drugs that may slow your actions.  · Have your blood work checked if you are on this drug for a long time. Talk with your doctor.  · If you drink grapefruit juice or eat grapefruit often, talk with your doctor.  · If you have phenylketonuria (PKU), talk with your doctor. Some products have phenylalanine.  · If you start or stop smoking, talk with your doctor. How much drug you take may need to be changed.  · If you are 65 or older, use this drug with care. You could have more side effects.  · This drug may cause harm to the unborn baby if you take it while you are pregnant, especially in the first trimester.  · If you are pregnant or you get pregnant while taking this drug, call your doctor right away.    What are some side effects that I need to call my doctor about right away?   WARNING/CAUTION: Even though it may be rare, some people may have very bad and sometimes deadly side effects when taking a drug. Tell your doctor or get medical help right away if you have any of the following signs or symptoms that may be related to a very bad side effect:  · Signs of an allergic reaction, like rash; hives; itching; red, swollen, blistered, or peeling skin with or without fever; wheezing; tightness in the chest or throat; trouble breathing, swallowing, or talking; unusual hoarseness; or swelling of the mouth, face, lips, tongue, or throat.  · Signs of depression, suicidal thoughts, emotional ups and downs, abnormal thinking, anxiety, or lack of  interest in life.  · Change in balance.  · Shortness of breath.  · Very bad dizziness or passing out.  · Feeling confused.  · Memory problems or loss.  · Trouble speaking.  · Trouble passing urine.  · Trouble controlling body movements.  · Muscle twitching.  · A fast heartbeat.  · Blurred eyesight.  · Period (menstrual) changes.  What are some other side effects of this drug?   All drugs may cause side effects. However, many people have no side effects or only have minor side effects. Call your doctor or get medical help if any of these side effects or any other side effects bother you or do not go away:  · Feeling dizzy, sleepy, tired, or weak.  · Dry mouth.  · Feeling more or less hungry.  · Constipation, diarrhea, throwing up, or upset stomach.  · Change in sex interest.  · Sex problems.  · Weight gain or loss.  · Trouble sleeping.  · Headache.  · Sweating a lot.  These are not all of the side effects that may occur. If you have questions about side effects, call your doctor. Call your doctor for medical advice about side effects.  You may report side effects to your national health agency.  You may report side effects to the FDA at 1-928.211.3118. You may also report side effects at https://www.fda.gov/medwatch.  How is this drug best taken?   Use this drug as ordered by your doctor. Read all information given to you. Follow all instructions closely.  All products:   · Take with or without food. Take with food if it causes an upset stomach.  Liquid (solution):   · Use the dropper that comes with this drug to measure the drug.  · Mix the liquid with water, juice, soda, applesauce, or pudding before taking it.  · Swallow the mixture right away. Do not store for use at a later time.  Oral-disintegrating tablet:   · If the tablets come in a foil blister, do not push the tablet out of the foil when opening. Use dry hands to take it from the foil.  · Place on your tongue and let it dissolve. Water is not needed. Do  not swallow it whole. Do not chew, break, or crush it.  Extended-release tablets:   · Swallow whole. Do not chew, break, or crush.  What do I do if I miss a dose?   · Take a missed dose as soon as you think about it.  · If it is close to the time for your next dose, skip the missed dose and go back to your normal time.  · Do not take 2 doses at the same time or extra doses.  · Some products are to be taken as needed. If you are taking this drug as needed, do not take more often than told by your doctor.    How do I store and/or throw out this drug?   All products:   · Store at room temperature in a dry place. Do not store in a bathroom.  · Store this drug in a safe place where children cannot see or reach it, and where other people cannot get to it. A locked box or area may help keep this drug safe. Keep all drugs away from pets.  · Throw away unused or  drugs. Do not flush down a toilet or pour down a drain unless you are told to do so. Check with your pharmacist if you have questions about the best way to throw out drugs. There may be drug take-back programs in your area.  Liquid (solution):   · Protect from light.  · Throw away any part not used 90 days after opening.  General drug facts   · If your symptoms or health problems do not get better or if they become worse, call your doctor.  · Do not share your drugs with others and do not take anyone else's drugs.  · Some drugs may have another patient information leaflet. If you have any questions about this drug, please talk with your doctor, nurse, pharmacist, or other health care provider.  · This drug comes with an extra patient fact sheet called a Medication Guide. Read it with care. Read it again each time this drug is refilled. If you have any questions about this drug, please talk with the doctor, pharmacist, or other health care provider.  · If you think there has been an overdose, call your poison control center or get medical care right away. Be  ready to tell or show what was taken, how much, and when it happened.    Consumer Information Use and Disclaimer   This generalized information is a limited summary of diagnosis, treatment, and/or medication information. It is not meant to be comprehensive and should be used as a tool to help the user understand and/or assess potential diagnostic and treatment options. It does NOT include all information about conditions, treatments, medications, side effects, or risks that may apply to a specific patient. It is not intended to be medical advice or a substitute for the medical advice, diagnosis, or treatment of a health care provider based on the health care provider's examination and assessment of a patient's specific and unique circumstances. Patients must speak with a health care provider for complete information about their health, medical questions, and treatment options, including any risks or benefits regarding use of medications. This information does not endorse any treatments or medications as safe, effective, or approved for treating a specific patient. [employer] and its affiliates disclaim any warranty or liability relating to this information or the use thereof. The use of this information is governed by the Terms of Use, available at https://www.[employer].fl3ur/en/solutions/lexicomp/about/jeanine.  Last Reviewed Date   2021-03-23  Copyright   © 2021 [employer] and its affiliates and/or licensors. All rights reserved.  Patient Education       Dextroamphetamine and Amphetamine (jalen gallagher am FET a meen & am FET a meen)   Brand Names: US Adderall; Adderall XR; Mydayis   Brand Names: Germain ACT Amphetamine XR; Adderall XR; APO-Amphetamine XR; PMS-Amphetamines XR; SANDOZ Amphetamine XR   Warning   · This drug has a risk of abuse and misuse. This drug may also be habit-forming if taken for a long time. Do not take for longer than you have been told by your doctor. Use only as you were told. Tell your  doctor if you have ever abused or been addicted to any drugs or alcohol. Misuse of this drug may cause heart-related side effects or even sudden death.    What is this drug used for?   · It is used to treat attention deficit problems with hyperactivity.  · It is used to treat narcolepsy.  · It may be given to you for other reasons. Talk with the doctor.    What do I need to tell my doctor BEFORE I take this drug?   · If you are allergic to this drug; any part of this drug; or any other drugs, foods, or substances. Tell your doctor about the allergy and what signs you had.  · If you or a family member have any of these health problems: Blood vessel disease, high blood pressure, heart structure problems or other heart problems, or Tourette's syndrome or tics.  · If you have any of these health problems: Glaucoma, agitation, anxiety, or overactive thyroid.  · If you have ever had any of these health problems: Drug abuse or stroke.  · If you have kidney disease.  · If you are taking any of these drugs: Acetazolamide or sodium bicarbonate.  · If you have taken certain drugs for depression or Parkinson's disease in the last 14 days. This includes isocarboxazid, phenelzine, tranylcypromine, selegiline, or rasagiline. Very high blood pressure may happen.  · If you are taking any of these drugs: Linezolid or methylene blue.  · If you are breast-feeding. Do not breast-feed while you take this drug.  This is not a list of all drugs or health problems that interact with this drug.  Tell your doctor and pharmacist about all of your drugs (prescription or OTC, natural products, vitamins) and health problems. You must check to make sure that it is safe for you to take this drug with all of your drugs and health problems. Do not start, stop, or change the dose of any drug without checking with your doctor.  What are some things I need to know or do while I take this drug?   · Tell all of your health care providers that you take  this drug. This includes your doctors, nurses, pharmacists, and dentists.  · Avoid driving and doing other tasks or actions that call for you to be alert until you see how this drug affects you.  · If you have been taking this drug for a long time or at high doses, it may not work as well and you may need higher doses to get the same effect. This is known as tolerance. Call your doctor if this drug stops working well. Do not take more than ordered.  · If you have been taking this drug for many weeks, talk with your doctor before stopping. You may want to slowly stop this drug.  · You may need to have some heart tests before starting this drug. If you have questions, talk with your doctor.  · This drug may cause high blood pressure.  · Check blood pressure and heart rate as the doctor has told you.  · Have blood work checked as you have been told by the doctor. Talk with the doctor.  · This drug may affect certain lab tests. Tell all of your health care providers and lab workers that you take this drug.  · Do not take antacids with this drug.  · Talk with your doctor before using OTC products that may raise blood pressure. These include cough or cold drugs, diet pills, stimulants, non-steroidal anti-inflammatory drugs (NSAIDs) like ibuprofen or naproxen, and some natural products or aids.  · You may need to avoid drinking alcohol with some products. Talk with your doctor or pharmacist to see if you need to avoid drinking alcohol with this drug.  · New or worse behavior and mood changes like change in thinking, anger, and hallucinations have happened with this drug. Tell your doctor if you or a family member have any mental or mood problems like depression or bipolar illness, or if a family member has committed suicide. Call your doctor right away if you have hallucinations; change in the way you act; or signs of mood changes like depression, thoughts of suicide, nervousness, emotional ups and downs, thinking that is  not normal, anxiety, or lack of interest in life.  · A severe and sometimes deadly problem called serotonin syndrome may happen if you take this drug with certain other drugs. Call your doctor right away if you have agitation; change in balance; confusion; hallucinations; fever; fast or abnormal heartbeat; flushing; muscle twitching or stiffness; seizures; shivering or shaking; sweating a lot; severe diarrhea, upset stomach, or throwing up; or severe headache.  · This drug may affect growth in children and teens in some cases. They may need regular growth checks. Talk with the doctor.  · Different brands of this drug may be for use in different ages of children. Talk with the doctor before giving this drug to a child.  · Tell your doctor if you are pregnant or plan on getting pregnant. You will need to talk about the benefits and risks of using this drug while you are pregnant.    What are some side effects that I need to call my doctor about right away?   WARNING/CAUTION: Even though it may be rare, some people may have very bad and sometimes deadly side effects when taking a drug. Tell your doctor or get medical help right away if you have any of the following signs or symptoms that may be related to a very bad side effect:  · Signs of an allergic reaction, like rash; hives; itching; red, swollen, blistered, or peeling skin with or without fever; wheezing; tightness in the chest or throat; trouble breathing, swallowing, or talking; unusual hoarseness; or swelling of the mouth, face, lips, tongue, or throat.  · Signs of high blood pressure like very bad headache or dizziness, passing out, or change in eyesight.  · Not able to get or keep an erection.  · Change in sex interest.  · Seizures.  · Trouble controlling body movements.  · Restlessness.  · Change in eyesight.  · Erections (hard penis) that happen often or that last a long time.  · Change in color of hands or feet. Skin may turn pale, blue, or  red.  · Numbness, pain, tingling, or cold feeling of the hands or feet.  · Any sores or wounds on the fingers or toes.  · Muscle pain or weakness, dark urine, or trouble passing urine.  · Pain when passing urine.  · Heart attacks, strokes, and sudden deaths have happened in adults taking this drug. Sudden deaths have also happened in children with some heart problems or heart defects. Call your doctor right away if you have a fast, slow, or abnormal heartbeat; weakness on 1 side of the body; trouble speaking or thinking; change in balance; drooping on 1 side of the face; change in eyesight; chest pain or pressure; shortness of breath; or severe dizziness or passing out.  What are some other side effects of this drug?   All drugs may cause side effects. However, many people have no side effects or only have minor side effects. Call your doctor or get medical help if any of these side effects or any other side effects bother you or do not go away:  · Feeling nervous and excitable.  · Headache.  · Trouble sleeping.  · Constipation, diarrhea, stomach pain, upset stomach, throwing up, or feeling less hungry.  · Feeling dizzy, tired, or weak.  · Dry mouth.  · Bad taste in your mouth.  · Weight loss.  These are not all of the side effects that may occur. If you have questions about side effects, call your doctor. Call your doctor for medical advice about side effects.  You may report side effects to your national health agency.  You may report side effects to the FDA at 1-982.211.8350. You may also report side effects at https://www.fda.gov/medwatch.  How is this drug best taken?   Use this drug as ordered by your doctor. Read all information given to you. Follow all instructions closely.  Tablets:   · Take with or without food.  · Take last dose of the day at least 4 hours before bedtime.  Extended-release capsules:   · Take this drug with or without food. Some products need to be taken the same way each time, either  always with food or always without food. Be sure you know how to take your product with regard to food. If you are not sure how to take your product with regard to food, talk with your doctor or pharmacist.  · Take in the morning.  · Swallow whole. Do not chew, break, or crush.  · You may sprinkle contents of capsule on applesauce. Do not chew.  · After mixing, take your dose right away. Do not store for future use.  What do I do if I miss a dose?   Tablets:   · Take a missed dose as soon as you think about it.  · If it is close to the time for your next dose, skip the missed dose and go back to your normal time.  · Do not take 2 doses at the same time or extra doses.  Extended-release capsules:   · Skip the missed dose and go back to your normal time.  · Do not take it later in the day.  How do I store and/or throw out this drug?   · Store at room temperature protected from light. Store in a dry place. Do not store in a bathroom.  · Store this drug in a safe place where children cannot see or reach it, and where other people cannot get to it. A locked box or area may help keep this drug safe. Keep all drugs away from pets.  · Throw away unused or  drugs. Do not flush down a toilet or pour down a drain unless you are told to do so. Check with your pharmacist if you have questions about the best way to throw out drugs. There may be drug take-back programs in your area.    General drug facts   · If your symptoms or health problems do not get better or if they become worse, call your doctor.  · Do not share your drugs with others and do not take anyone else's drugs.  · Some drugs may have another patient information leaflet. If you have any questions about this drug, please talk with your doctor, nurse, pharmacist, or other health care provider.  · This drug comes with an extra patient fact sheet called a Medication Guide. Read it with care. Read it again each time this drug is refilled. If you have any  questions about this drug, please talk with the doctor, pharmacist, or other health care provider.  · If you think there has been an overdose, call your poison control center or get medical care right away. Be ready to tell or show what was taken, how much, and when it happened.    Consumer Information Use and Disclaimer   This generalized information is a limited summary of diagnosis, treatment, and/or medication information. It is not meant to be comprehensive and should be used as a tool to help the user understand and/or assess potential diagnostic and treatment options. It does NOT include all information about conditions, treatments, medications, side effects, or risks that may apply to a specific patient. It is not intended to be medical advice or a substitute for the medical advice, diagnosis, or treatment of a health care provider based on the health care provider's examination and assessment of a patient's specific and unique circumstances. Patients must speak with a health care provider for complete information about their health, medical questions, and treatment options, including any risks or benefits regarding use of medications. This information does not endorse any treatments or medications as safe, effective, or approved for treating a specific patient. [employer], Inc[employer] and its affiliates disclaim any warranty or liability relating to this information or the use thereof. The use of this information is governed by the Terms of Use, available at https://www.[employer]/en/solutions/lexicomp/about/jeanine.  Last Reviewed Date   2020-05-11  Copyright   © 2021 [employer] and its affiliates and/or licensors. All rights reserved.  Patient Education       Fluoxetine (floo OKS e teen)   Brand Names: US PROzac; Sarafem [DSC]   Brand Names: Germain ACCEL-FLUoxetine [DSC]; ACH-FLUoxetine; ACT FLUoxetine; AG-Fluoxetine; APO-FLUoxetine; Auro-FLUoxetine; BIO-FLUoxetine; DOM-FLUoxetine; JAMP-FLUoxetine;  Mar-FLUoxetine [DSC]; MINT-FLUoxetine; MYLAN-FLUoxetine [DSC]; NRA-Fluoxetine; Odan-FLUoxetine; PMS-FLUoxetine; PRIVA-FLUoxetine; PRO-FLUoxetine; PROzac; RAN-FLUoxetine; RENE-FLUoxetine; SANDOZ FLUoxetine [DSC]; TEVA-FLUoxetine; VAN-FLUoxetine [DSC]   Warning   · Drugs like this one have raised the chance of suicidal thoughts or actions in children and young adults. The risk may be greater in people who have had these thoughts or actions in the past. All people who take this drug need to be watched closely. Call the doctor right away if signs like low mood (depression), nervousness, restlessness, grouchiness, panic attacks, or changes in mood or actions are new or worse. Call the doctor right away if any thoughts or actions of suicide occur.  · This drug is not approved for use in all children. Talk with the doctor to be sure that this drug is right for your child.    What is this drug used for?   · It is used to treat low mood (depression).  · It is used to treat obsessive-compulsive problems.  · It is used to treat mood problems caused by monthly periods.  · It is used to treat eating problems.  · It is used to treat panic attacks.  · It may be given to you for other reasons. Talk with the doctor.    What do I need to tell my doctor BEFORE I take this drug?   · If you are allergic to this drug; any part of this drug; or any other drugs, foods, or substances. Tell your doctor about the allergy and what signs you had.  · If you are taking any of these drugs: Linezolid, methylene blue, pimozide, or thioridazine.  · If you have taken certain drugs for depression or Parkinson's disease in the last 14 days. This includes isocarboxazid, phenelzine, tranylcypromine, selegiline, or rasagiline. Very high blood pressure may happen.  · If you are taking any drugs that can cause a certain type of heartbeat that is not normal (prolonged QT interval). There are many drugs that can do this. Ask your doctor or pharmacist if you  are not sure.  · If you are breast-feeding. Do not breast-feed while you take this drug.  This is not a list of all drugs or health problems that interact with this drug.  Tell your doctor and pharmacist about all of your drugs (prescription or OTC, natural products, vitamins) and health problems. You must check to make sure that it is safe for you to take this drug with all of your drugs and health problems. Do not start, stop, or change the dose of any drug without checking with your doctor.  What are some things I need to know or do while I take this drug?   · Tell all of your health care providers that you take this drug. This includes your doctors, nurses, pharmacists, and dentists.  · Avoid driving and doing other tasks or actions that call for you to be alert until you see how this drug affects you.  · Do not stop taking this drug all of a sudden without calling your doctor. You may have a greater risk of side effects. If you need to stop this drug, you will want to slowly stop it as ordered by your doctor.  · Avoid drinking alcohol while taking this drug.  · Talk with your doctor before you use marijuana, other forms of cannabis, or prescription or OTC drugs that may slow your actions.  · If you have high blood sugar (diabetes), you will need to watch your blood sugar closely.  · It may take several weeks to see the full effects.  · This drug may raise the chance of bleeding. Sometimes, bleeding can be life-threatening. Talk with the doctor.  · Very bad and sometimes deadly reactions along with a rash have rarely happened with this drug. Lung, kidney, or liver problems have also happened. Call your doctor right away if you have a change in the amount of urine passed, dark urine, not hungry, upset stomach or stomach pain, light-colored stools, throwing up, yellow skin or eyes, or shortness of breath.  · Some people may have a higher chance of eye problems with this drug. Your doctor may want you to have an  eye exam to see if you have a higher chance of these eye problems. Call your doctor right away if you have eye pain, change in eyesight, or swelling or redness in or around the eye.  · This drug can cause low sodium levels. Very low sodium levels can be life-threatening, leading to seizures, passing out, trouble breathing, or death.  · This drug may affect growth in children and teens in some cases. They may need regular growth checks. Talk with the doctor.  · If you are 65 or older, use this drug with care. You could have more side effects.  · Tell your doctor if you are pregnant or plan on getting pregnant. You will need to talk about the benefits and risks of using this drug while you are pregnant.  · Taking this drug in the third trimester of pregnancy may lead to some health problems in the . Talk with the doctor.    What are some side effects that I need to call my doctor about right away?   WARNING/CAUTION: Even though it may be rare, some people may have very bad and sometimes deadly side effects when taking a drug. Tell your doctor or get medical help right away if you have any of the following signs or symptoms that may be related to a very bad side effect:  · Signs of an allergic reaction, like rash; hives; itching; red, swollen, blistered, or peeling skin with or without fever; wheezing; tightness in the chest or throat; trouble breathing, swallowing, or talking; unusual hoarseness; or swelling of the mouth, face, lips, tongue, or throat.  · Signs of low sodium levels like headache, trouble focusing, memory problems, feeling confused, weakness, seizures, or change in balance.  · A big weight gain or loss.  · Seizures.  · Dizziness.  · Slow heartbeat.  · Any unexplained bruising or bleeding.  · Anxiety.  · More thirst.  · Period (menstrual) changes.  · Passing urine more often.  · Trouble controlling body movements.  · Painful erection (hard penis) or an erection that lasts for longer than 4  hours.  · Sex problems have happened with drugs like this one. This includes lowered interest in sex, trouble having an orgasm, ejaculation problems, or trouble getting or keeping an erection. If you have any sex problems or if you have any questions, talk with your doctor.  · A severe and sometimes deadly problem called serotonin syndrome may happen. The risk may be greater if you also take certain other drugs. Call your doctor right away if you have agitation; change in balance; confusion; hallucinations; fever; fast or abnormal heartbeat; flushing; muscle twitching or stiffness; seizures; shivering or shaking; sweating a lot; severe diarrhea, upset stomach, or throwing up; or very bad headache.  · A type of abnormal heartbeat (prolonged QT interval) has happened with this drug. Sometimes, this has led to another type of unsafe abnormal heartbeat (torsades de pointes). Call your doctor right away if you have a fast or abnormal heartbeat, or if you pass out.  What are some other side effects of this drug?   All drugs may cause side effects. However, many people have no side effects or only have minor side effects. Call your doctor or get medical help if any of these side effects or any other side effects bother you or do not go away:  · Constipation, diarrhea, upset stomach, throwing up, or feeling less hungry.  · Dry mouth.  · Feeling sleepy.  · Strange or odd dreams.  · Trouble sleeping.  · Feeling tired or weak.  · Flu-like signs.  · Yawning.  · Hot flashes.  · Feeling nervous and excitable.  · Shakiness.  · Sweating a lot.  · Headache.  · Nose or throat irritation.  These are not all of the side effects that may occur. If you have questions about side effects, call your doctor. Call your doctor for medical advice about side effects.  You may report side effects to your national health agency.  You may report side effects to the FDA at 1-687.919.1321. You may also report side effects at  https://www.fda.gov/medwatch.  How is this drug best taken?   Use this drug as ordered by your doctor. Read all information given to you. Follow all instructions closely.  All products:   · Take with or without food.  · Keep taking this drug as you have been told by your doctor or other health care provider, even if you feel well.  Long-acting products:   · Swallow whole. Do not chew, break, or crush.  Liquid (solution):   · Measure liquid doses carefully. Use the measuring device that comes with this drug. If there is none, ask the pharmacist for a device to measure this drug.  What do I do if I miss a dose?   · Take a missed dose as soon as you think about it.  · If it is close to the time for your next dose, skip the missed dose and go back to your normal time.  · Do not take 2 doses at the same time or extra doses.    How do I store and/or throw out this drug?   · Store at room temperature protected from light. Store in a dry place. Do not store in a bathroom.  · Keep lid tightly closed.  · Keep all drugs in a safe place. Keep all drugs out of the reach of children and pets.  · Throw away unused or  drugs. Do not flush down a toilet or pour down a drain unless you are told to do so. Check with your pharmacist if you have questions about the best way to throw out drugs. There may be drug take-back programs in your area.    General drug facts   · If your symptoms or health problems do not get better or if they become worse, call your doctor.  · Do not share your drugs with others and do not take anyone else's drugs.  · Some drugs may have another patient information leaflet. If you have any questions about this drug, please talk with your doctor, nurse, pharmacist, or other health care provider.  · This drug comes with an extra patient fact sheet called a Medication Guide. Read it with care. Read it again each time this drug is refilled. If you have any questions about this drug, please talk with the  doctor, pharmacist, or other health care provider.  · If you think there has been an overdose, call your poison control center or get medical care right away. Be ready to tell or show what was taken, how much, and when it happened.    Consumer Information Use and Disclaimer   This generalized information is a limited summary of diagnosis, treatment, and/or medication information. It is not meant to be comprehensive and should be used as a tool to help the user understand and/or assess potential diagnostic and treatment options. It does NOT include all information about conditions, treatments, medications, side effects, or risks that may apply to a specific patient. It is not intended to be medical advice or a substitute for the medical advice, diagnosis, or treatment of a health care provider based on the health care provider's examination and assessment of a patient's specific and unique circumstances. Patients must speak with a health care provider for complete information about their health, medical questions, and treatment options, including any risks or benefits regarding use of medications. This information does not endorse any treatments or medications as safe, effective, or approved for treating a specific patient. [employer] and its affiliates disclaim any warranty or liability relating to this information or the use thereof. The use of this information is governed by the Terms of Use, available at https://www.[employer]er.com/en/solutions/lexicomp/about/jeanine.  Last Reviewed Date   2021-07-19  Copyright   © 2021 [employer] and its affiliates and/or licensors. All rights reserved.

## 2022-02-03 NOTE — PROGRESS NOTES
"Outpatient Psychiatry Follow-Up Visit (MD/NP)    2/3/2022      The patient location is: [place], [place]  The chief complaint leading to consultation is: inattention, depression and anxiety    Visit type: audiovisual    Face to Face time with patient: 30 minutes  40 minutes of total time spent on the encounter, which includes face to face time and non-face to face time preparing to see the patient (eg, review of tests), Obtaining and/or reviewing separately obtained history, Documenting clinical information in the electronic or other health record, Independently interpreting results (not separately reported) and communicating results to the patient/family/caregiver, or Care coordination (not separately reported).         Each patient to whom he or she provides medical services by telemedicine is:  (1) informed of the relationship between the physician and patient and the respective role of any other health care provider with respect to management of the patient; and (2) notified that he or she may decline to receive medical services by telemedicine and may withdraw from such care at any time.      Clinical Status of Patient:  Outpatient (Ambulatory)    Chief Complaint:  Stormy Carlisle is a 41 y.o. female who presents today for follow-up of depression, anxiety and attention problems. She is taking  20 mg Prozac & adderall 10 mg BID for ADHD.    Current Psychiatric Medications:  · Prozac 20 mg daily for mood  · Adderall 10 mg PO BID    Interval History and Content of Current Session:    Patient stated that she has been doing okay since her last visit. She stated she feels a little anxious at times. She stated she was anxious when her parents visited her and stated her  is having a minor surgery tomorrow. She stated she only takes xanax 0.25 mg as needed for severe anxiety and panic attacks but does not take it often. She described her mood as "fine" and her affect was euthymic. She continues to report " adderall 10 mg po BID is very helpful in increasing her focus, attention and productivity. She continues to repot good sleep and appetite. She continues to report improved depression and anxiety with Prozac 20 mg po daily. She denied SE and AE including jitteriness, CP, palpitations. She denied SI/HI/AVH. She denied hopelessness. No objective ssx of jonny or psychosis. Patient VS are within normal range and no history of hypertension. She stated she is interested in therapy. Provided supportive therapy to patient and encouraged utilization of effective coping skills.       Past med trials: Wellbutrin (helped mood/motivation, but caused her to pass out, have decrease appetite, weight loss), Paxil was very hard to come off of and does not remember how much it helped. Prozac worked but was insufficiently helpful, venlafaxine (brain zaps and interfered with sleep) and vistaril (too groggy, sedating and made her feel tired that caused her to feel anxious and depressed)    Psychiatric Review Of Systems - Is patient experiencing or having changes in:  sleep: no  appetite: no  weight: no  energy/anergy: no  interest/pleasure/anhedonia: no  somatic symptoms: no  anxiety/panic: yes   guilty/hopelessness: no  concentration: no. Improved with adderall  S.I.B.s/risky behavior: no  Irritability: no  Racing thoughts: no  Impulsive behaviors: no  Paranoia:no  AVH:no  Suicidal thoughts/plan/intent: no    Review of Systems     MEDICAL REVIEW OF SYSTEMS  History obtained from the patient   General : NO chills or fever   Eyes: NO  visual changes   ENT: NO  nasal discharge or sore throat   Endocrine: NO weight changes   Dermatological: NO rashes   Respiratory: NO cough, shortness of breath   Cardiovascular: NO chest pain, palpitations or racing heart   Gastrointestinal: NO nausea, vomiting, constipation or diarrhea   Musculoskeletal: NO muscle pain or stiffness   Neurological: NO , dizziness, headaches or  "tremors   Psychiatric: please see HPI        Past Medical, Family and Social History: The patient's past medical, family and social history have been reviewed and updated as appropriate within the electronic medical record - see encounter notes.  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 Has been through CBT but not recently. Med trials as above plus ambien per chart in 2015    Compliance: yes    Side effects: None    Risk Parameters:  Patient reports no suicidal ideation  Patient reports no homicidal ideation  Patient reports no self-injurious behavior  Patient reports no violent behavior    Exam (detailed: at least 9 elements; comprehensive: all 15 elements)   Constitutional  Vitals:  Most recent vital signs, dated greater than 90 days prior to this appointment, were reviewed.   There were no vitals filed for this visit.   General:  unremarkable, age appropriate     Musculoskeletal  Muscle Strength/Tone:  no dystonia, no tremor, no tic   Gait & Station:  Normal gait     Psychiatric  Speech:  no latency; no press, spontaneous,    Mood & Affect:  "fine"  euthymic   Thought Process:  normal and logical, logical   Associations:  intact   Thought Content:  normal, no suicidality, no homicidality, delusions, or paranoia   Insight:  intact, has awareness of illness   Judgement: behavior is adequate to circumstances, age appropriate   Orientation:  grossly intact   Memory: intact for content of interview, able to remember recent events- yes, able to remember remote events- yes   Language: grossly intact   Attention Span & Concentration:  able to focus   Fund of Knowledge:  intact and appropriate to age and level of education     Assessment and Diagnosis   Status/Progress: Based on the examination today, the patient's problem(s) is/are not well controlled.  New problems have not been presented today.   Co-morbidities, Diagnostic uncertainty and Lack " of compliance are not complicating management of the primary condition.  There are no active rule-out diagnoses for this patient at this time.     General Impression: Doing well.    No diagnosis found.  Intervention/Counseling/Treatment Plan     Depression & Anxiety  Continue prozac 20 mg to help with depressive sx  Continue xanax 0.25 mg po PRN daily for severe anxiety and panic attacks      ADHD, inattentive type  · Continue Adderall IR 10 mg PO BID      -Restart psychotherapy and a referral is sent today    Previously discussed risks, benefits, AE's (incl serotonin syndrome) SE's (including but not limited to GI sx, insomnia, agitation/anxiety/activation, appetite changes, HA, hypotension, others) inherent unpredictability of illness and treatment option    Discussed informed consent, diagnosis, risks and benefits of proposed treatment above vs alternative treatments vs no treatment, and potential side effects of these treatments. The patient expresses understanding of the above and displays the capacity to agree with this treatment given said understanding. Patient also agrees that, currently, the benefits outweigh the risks and would like to pursue treatment at this time. Answered all questions and discussed follow up. Encouraged patient to contact us with any questions or concerns.    Encouraged Patient to keep future appointment    Take medications as prescribed     Patient to present to ED for thoughts to harm herself or others        Return to Clinic: 3 months or sooner if needed    BREANNA Slade-BC

## 2022-03-25 ENCOUNTER — PATIENT MESSAGE (OUTPATIENT)
Dept: PSYCHIATRY | Facility: CLINIC | Age: 42
End: 2022-03-25
Payer: COMMERCIAL

## 2022-03-25 DIAGNOSIS — F90.0 ATTENTION DEFICIT HYPERACTIVITY DISORDER (ADHD), PREDOMINANTLY INATTENTIVE TYPE: ICD-10-CM

## 2022-03-25 RX ORDER — DEXTROAMPHETAMINE SACCHARATE, AMPHETAMINE ASPARTATE, DEXTROAMPHETAMINE SULFATE AND AMPHETAMINE SULFATE 2.5; 2.5; 2.5; 2.5 MG/1; MG/1; MG/1; MG/1
10 TABLET ORAL 2 TIMES DAILY
Qty: 60 TABLET | Refills: 0 | Status: SHIPPED | OUTPATIENT
Start: 2022-04-24 | End: 2022-06-01 | Stop reason: SDUPTHER

## 2022-03-25 RX ORDER — DEXTROAMPHETAMINE SACCHARATE, AMPHETAMINE ASPARTATE, DEXTROAMPHETAMINE SULFATE AND AMPHETAMINE SULFATE 2.5; 2.5; 2.5; 2.5 MG/1; MG/1; MG/1; MG/1
10 TABLET ORAL 2 TIMES DAILY
Qty: 60 TABLET | Refills: 0 | Status: SHIPPED | OUTPATIENT
Start: 2022-03-25 | End: 2022-04-24

## 2022-03-28 ENCOUNTER — PATIENT MESSAGE (OUTPATIENT)
Dept: PSYCHIATRY | Facility: CLINIC | Age: 42
End: 2022-03-28
Payer: COMMERCIAL

## 2022-05-31 ENCOUNTER — PATIENT MESSAGE (OUTPATIENT)
Dept: PSYCHIATRY | Facility: CLINIC | Age: 42
End: 2022-05-31
Payer: COMMERCIAL

## 2022-06-01 DIAGNOSIS — F90.0 ATTENTION DEFICIT HYPERACTIVITY DISORDER (ADHD), PREDOMINANTLY INATTENTIVE TYPE: ICD-10-CM

## 2022-06-01 RX ORDER — DEXTROAMPHETAMINE SACCHARATE, AMPHETAMINE ASPARTATE, DEXTROAMPHETAMINE SULFATE AND AMPHETAMINE SULFATE 2.5; 2.5; 2.5; 2.5 MG/1; MG/1; MG/1; MG/1
10 TABLET ORAL 2 TIMES DAILY
Qty: 60 TABLET | Refills: 0 | Status: SHIPPED | OUTPATIENT
Start: 2022-06-01 | End: 2022-06-06 | Stop reason: SDUPTHER

## 2022-06-06 ENCOUNTER — OFFICE VISIT (OUTPATIENT)
Dept: PSYCHIATRY | Facility: CLINIC | Age: 42
End: 2022-06-06
Payer: COMMERCIAL

## 2022-06-06 DIAGNOSIS — F41.1 GAD (GENERALIZED ANXIETY DISORDER): ICD-10-CM

## 2022-06-06 DIAGNOSIS — F90.0 ATTENTION DEFICIT HYPERACTIVITY DISORDER (ADHD), PREDOMINANTLY INATTENTIVE TYPE: ICD-10-CM

## 2022-06-06 DIAGNOSIS — F33.41 MDD (MAJOR DEPRESSIVE DISORDER), RECURRENT, IN PARTIAL REMISSION: Primary | ICD-10-CM

## 2022-06-06 PROCEDURE — 99214 OFFICE O/P EST MOD 30 MIN: CPT | Mod: 95,,, | Performed by: NURSE PRACTITIONER

## 2022-06-06 PROCEDURE — 99214 PR OFFICE/OUTPT VISIT, EST, LEVL IV, 30-39 MIN: ICD-10-PCS | Mod: 95,,, | Performed by: NURSE PRACTITIONER

## 2022-06-06 RX ORDER — DEXTROAMPHETAMINE SACCHARATE, AMPHETAMINE ASPARTATE, DEXTROAMPHETAMINE SULFATE AND AMPHETAMINE SULFATE 2.5; 2.5; 2.5; 2.5 MG/1; MG/1; MG/1; MG/1
10 TABLET ORAL 2 TIMES DAILY
Qty: 60 TABLET | Refills: 0 | Status: SHIPPED | OUTPATIENT
Start: 2022-06-28 | End: 2022-06-20 | Stop reason: SDUPTHER

## 2022-06-06 RX ORDER — DEXTROAMPHETAMINE SACCHARATE, AMPHETAMINE ASPARTATE, DEXTROAMPHETAMINE SULFATE AND AMPHETAMINE SULFATE 2.5; 2.5; 2.5; 2.5 MG/1; MG/1; MG/1; MG/1
10 TABLET ORAL 2 TIMES DAILY
Qty: 60 TABLET | Refills: 0 | Status: SHIPPED | OUTPATIENT
Start: 2022-07-26 | End: 2022-07-28 | Stop reason: SDUPTHER

## 2022-06-06 NOTE — PROGRESS NOTES
Outpatient Psychiatry Follow-Up Visit (MD/NP)    6/6/2022      The patient location is: [place], [place]  The chief complaint leading to consultation is: inattention, depression and anxiety    Visit type: audiovisual    Face to Face time with patient: 13  minutes  23 minutes of total time spent on the encounter, which includes face to face time and non-face to face time preparing to see the patient (eg, review of tests), Obtaining and/or reviewing separately obtained history, Documenting clinical information in the electronic or other health record, Independently interpreting results (not separately reported) and communicating results to the patient/family/caregiver, or Care coordination (not separately reported).         Each patient to whom he or she provides medical services by telemedicine is:  (1) informed of the relationship between the physician and patient and the respective role of any other health care provider with respect to management of the patient; and (2) notified that he or she may decline to receive medical services by telemedicine and may withdraw from such care at any time.      Clinical Status of Patient:  Outpatient (Ambulatory)    Chief Complaint:  Stormy Carlisle is a 42 y.o. female who presents today for follow-up of depression, anxiety and attention problems. She is taking  20 mg Prozac & adderall 10 mg BID for ADHD.    Current Psychiatric Medications:  · Prozac 20 mg daily for mood  · Adderall 10 mg PO BID  · Xanax 0.25 mg po RPN (rarely takes for severe anxiety)    Interval History and Content of Current Session:    Patient stated that she has been doing okay since her last visit. 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 She stated she feels anxious but still manageable with medication and coping strategies. She stated she gets a little depressed when she feels overwhelmed and stressed but finds swimming, gardening, and playing with her kids helpful in  "managing her depressive symptoms. She stated she only takes xanax 0.25 mg as needed for severe anxiety and panic attacks and does not take it often. She described her mood as "great" and her affect was bright. She continues to report adderall 10 mg po BID is very helpful in increasing her focus, attention and productivity. She continues to repot good sleep and appetite. She continues to report improved depression and anxiety with Prozac 20 mg po daily and coping strategies. She denied SE and AE including jitteriness, CP, palpitations. She denied SI/HI/AVH. She denied hopelessness. No objective ssx of jonny or psychosis. Patient VS are within normal range and no history of hypertension.    Past med trials: Wellbutrin (helped mood/motivation, but caused her to pass out, have decrease appetite, weight loss), Paxil was very hard to come off of and does not remember how much it helped. Prozac worked but was insufficiently helpful, venlafaxine (brain zaps and interfered with sleep) and vistaril (too groggy, sedating and made her feel tired that caused her to feel anxious and depressed)    Psychiatric Review Of Systems - Is patient experiencing or having changes in:  sleep: no  appetite: no  weight: no  energy/anergy: no  interest/pleasure/anhedonia: no  somatic symptoms: no  anxiety/panic: yes but manageable  guilty/hopelessness: no  concentration: no. Improved with adderall  S.I.B.s/risky behavior: no  Irritability: no  Racing thoughts: no  Impulsive behaviors: no  Paranoia:no  AVH:no  Suicidal thoughts/plan/intent: no    Review of Systems     MEDICAL REVIEW OF SYSTEMS  History obtained from the patient   General : NO chills or fever   Eyes: NO  visual changes   ENT: NO  nasal discharge or sore throat   Endocrine: NO weight changes   Dermatological: NO rashes   Respiratory: NO cough, shortness of breath   Cardiovascular: NO chest pain, palpitations or racing heart   Gastrointestinal: NO nausea, vomiting, " "constipation or diarrhea   Musculoskeletal: NO muscle pain or stiffness   Neurological: NO , dizziness, headaches or tremors   Psychiatric: please see HPI        Past Medical, Family and Social History: The patient's past medical, family and social history have been reviewed and updated as appropriate within the electronic medical record - see encounter notes.  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 Has been through CBT but not recently. Med trials as above plus ambien per chart in 2015    Compliance: yes    Side effects: None    Risk Parameters:  Patient reports no suicidal ideation  Patient reports no homicidal ideation  Patient reports no self-injurious behavior  Patient reports no violent behavior    Exam (detailed: at least 9 elements; comprehensive: all 15 elements)   Constitutional  Vitals:  Most recent vital signs, dated greater than 90 days prior to this appointment, were reviewed.   There were no vitals filed for this visit.   General:  unremarkable, age appropriate     Musculoskeletal  Muscle Strength/Tone:  no dystonia, no tremor, no tic   Gait & Station:  Normal gait     Psychiatric  Speech:  no latency; no press, spontaneous,    Mood & Affect:  "great"  bright   Thought Process:  normal and logical, logical   Associations:  intact   Thought Content:  normal, no suicidality, no homicidality, delusions, or paranoia   Insight:  intact, has awareness of illness   Judgement: behavior is adequate to circumstances, age appropriate   Orientation:  grossly intact   Memory: intact for content of interview, able to remember recent events- yes, able to remember remote events- yes   Language: grossly intact   Attention Span & Concentration:  able to focus   Fund of Knowledge:  intact and appropriate to age and level of education     Assessment and Diagnosis   Status/Progress: Based on the examination today, the patient's problem(s) " is/are not well controlled.  New problems have not been presented today.   Co-morbidities, Diagnostic uncertainty and Lack of compliance are not complicating management of the primary condition.  There are no active rule-out diagnoses for this patient at this time.     General Impression: Doing well.      ICD-10-CM ICD-9-CM   1. MDD (major depressive disorder), recurrent, in partial remission  F33.41 296.35   2. Attention deficit hyperactivity disorder (ADHD), predominantly inattentive type  F90.0 314.00   3. JAMEY (generalized anxiety disorder)  F41.1 300.02        Intervention/Counseling/Treatment Plan     Depression & Anxiety  Continue prozac 20 mg to help with depressive sx  Continue xanax 0.25 mg po PRN daily for severe anxiety and panic attacks (rarely takes it)      ADHD, inattentive type  · Continue Adderall IR 10 mg PO BID      -Recommended psychotherapy     Previously discussed risks, benefits, AE's (incl serotonin syndrome) SE's (including but not limited to GI sx, insomnia, agitation/anxiety/activation, appetite changes, HA, hypotension, others) inherent unpredictability of illness and treatment option    Discussed informed consent, diagnosis, risks and benefits of proposed treatment above vs alternative treatments vs no treatment, and potential side effects of these treatments. The patient expresses understanding of the above and displays the capacity to agree with this treatment given said understanding. Patient also agrees that, currently, the benefits outweigh the risks and would like to pursue treatment at this time. Answered all questions and discussed follow up. Encouraged patient to contact us with any questions or concerns.    Encouraged Patient to keep future appointment    Take medications as prescribed     Patient to present to ED for thoughts to harm herself or others        Return to Clinic: 3 months or sooner if needed    Gina Murphy, BREANNA-BC

## 2022-06-19 ENCOUNTER — PATIENT MESSAGE (OUTPATIENT)
Dept: PSYCHIATRY | Facility: CLINIC | Age: 42
End: 2022-06-19
Payer: COMMERCIAL

## 2022-06-19 DIAGNOSIS — F90.0 ATTENTION DEFICIT HYPERACTIVITY DISORDER (ADHD), PREDOMINANTLY INATTENTIVE TYPE: ICD-10-CM

## 2022-06-20 ENCOUNTER — PATIENT MESSAGE (OUTPATIENT)
Dept: PSYCHIATRY | Facility: CLINIC | Age: 42
End: 2022-06-20
Payer: COMMERCIAL

## 2022-06-20 DIAGNOSIS — F90.0 ATTENTION DEFICIT HYPERACTIVITY DISORDER (ADHD), PREDOMINANTLY INATTENTIVE TYPE: ICD-10-CM

## 2022-06-20 RX ORDER — FLUOXETINE HYDROCHLORIDE 20 MG/1
CAPSULE ORAL
Qty: 30 CAPSULE | Refills: 2 | Status: SHIPPED | OUTPATIENT
Start: 2022-06-20 | End: 2022-10-31 | Stop reason: SDUPTHER

## 2022-06-20 RX ORDER — DEXTROAMPHETAMINE SACCHARATE, AMPHETAMINE ASPARTATE, DEXTROAMPHETAMINE SULFATE AND AMPHETAMINE SULFATE 2.5; 2.5; 2.5; 2.5 MG/1; MG/1; MG/1; MG/1
10 TABLET ORAL 2 TIMES DAILY
Qty: 60 TABLET | Refills: 0 | Status: SHIPPED | OUTPATIENT
Start: 2022-06-20 | End: 2022-06-21 | Stop reason: SDUPTHER

## 2022-06-21 ENCOUNTER — PATIENT MESSAGE (OUTPATIENT)
Dept: PSYCHIATRY | Facility: CLINIC | Age: 42
End: 2022-06-21
Payer: COMMERCIAL

## 2022-06-21 RX ORDER — DEXTROAMPHETAMINE SACCHARATE, AMPHETAMINE ASPARTATE, DEXTROAMPHETAMINE SULFATE AND AMPHETAMINE SULFATE 2.5; 2.5; 2.5; 2.5 MG/1; MG/1; MG/1; MG/1
10 TABLET ORAL 2 TIMES DAILY
Qty: 60 TABLET | Refills: 0 | Status: SHIPPED | OUTPATIENT
Start: 2022-06-21 | End: 2022-07-21

## 2022-06-22 NOTE — TELEPHONE ENCOUNTER
----- Message from Mily Olmstead MA sent at 6/21/2022 11:51 AM CDT -----  Contact: [phone]  Good morning,       Patient said Dk would like for you to contact them concerning a lost prescription of Adderall for patient please?    Thank you,  Mily

## 2022-07-27 ENCOUNTER — PATIENT MESSAGE (OUTPATIENT)
Dept: PSYCHIATRY | Facility: CLINIC | Age: 42
End: 2022-07-27
Payer: COMMERCIAL

## 2022-07-28 DIAGNOSIS — F90.0 ATTENTION DEFICIT HYPERACTIVITY DISORDER (ADHD), PREDOMINANTLY INATTENTIVE TYPE: ICD-10-CM

## 2022-07-28 RX ORDER — DEXTROAMPHETAMINE SACCHARATE, AMPHETAMINE ASPARTATE, DEXTROAMPHETAMINE SULFATE AND AMPHETAMINE SULFATE 2.5; 2.5; 2.5; 2.5 MG/1; MG/1; MG/1; MG/1
10 TABLET ORAL 2 TIMES DAILY
Qty: 60 TABLET | Refills: 0 | Status: SHIPPED | OUTPATIENT
Start: 2022-07-28 | End: 2022-09-12 | Stop reason: SDUPTHER

## 2022-09-09 ENCOUNTER — PATIENT MESSAGE (OUTPATIENT)
Dept: OBSTETRICS AND GYNECOLOGY | Facility: CLINIC | Age: 42
End: 2022-09-09
Payer: COMMERCIAL

## 2022-09-10 ENCOUNTER — PATIENT MESSAGE (OUTPATIENT)
Dept: OBSTETRICS AND GYNECOLOGY | Facility: CLINIC | Age: 42
End: 2022-09-10
Payer: COMMERCIAL

## 2022-09-12 ENCOUNTER — PATIENT MESSAGE (OUTPATIENT)
Dept: PSYCHIATRY | Facility: CLINIC | Age: 42
End: 2022-09-12
Payer: COMMERCIAL

## 2022-09-12 DIAGNOSIS — F90.0 ATTENTION DEFICIT HYPERACTIVITY DISORDER (ADHD), PREDOMINANTLY INATTENTIVE TYPE: ICD-10-CM

## 2022-09-12 RX ORDER — DEXTROAMPHETAMINE SACCHARATE, AMPHETAMINE ASPARTATE, DEXTROAMPHETAMINE SULFATE AND AMPHETAMINE SULFATE 2.5; 2.5; 2.5; 2.5 MG/1; MG/1; MG/1; MG/1
10 TABLET ORAL 2 TIMES DAILY
Qty: 60 TABLET | Refills: 0 | Status: SHIPPED | OUTPATIENT
Start: 2022-09-12 | End: 2022-09-16 | Stop reason: SDUPTHER

## 2022-09-13 ENCOUNTER — PATIENT MESSAGE (OUTPATIENT)
Dept: PSYCHIATRY | Facility: CLINIC | Age: 42
End: 2022-09-13
Payer: COMMERCIAL

## 2022-09-13 DIAGNOSIS — F90.0 ATTENTION DEFICIT HYPERACTIVITY DISORDER (ADHD), PREDOMINANTLY INATTENTIVE TYPE: ICD-10-CM

## 2022-09-16 ENCOUNTER — PATIENT MESSAGE (OUTPATIENT)
Dept: PSYCHIATRY | Facility: CLINIC | Age: 42
End: 2022-09-16
Payer: COMMERCIAL

## 2022-09-16 RX ORDER — DEXTROAMPHETAMINE SACCHARATE, AMPHETAMINE ASPARTATE, DEXTROAMPHETAMINE SULFATE AND AMPHETAMINE SULFATE 2.5; 2.5; 2.5; 2.5 MG/1; MG/1; MG/1; MG/1
10 TABLET ORAL 2 TIMES DAILY
Qty: 60 TABLET | Refills: 0 | Status: SHIPPED | OUTPATIENT
Start: 2022-09-16 | End: 2022-10-03 | Stop reason: SDUPTHER

## 2022-10-03 ENCOUNTER — OFFICE VISIT (OUTPATIENT)
Dept: PSYCHIATRY | Facility: CLINIC | Age: 42
End: 2022-10-03
Payer: COMMERCIAL

## 2022-10-03 DIAGNOSIS — F90.0 ATTENTION DEFICIT HYPERACTIVITY DISORDER (ADHD), PREDOMINANTLY INATTENTIVE TYPE: ICD-10-CM

## 2022-10-03 DIAGNOSIS — F33.41 MDD (MAJOR DEPRESSIVE DISORDER), RECURRENT, IN PARTIAL REMISSION: Primary | ICD-10-CM

## 2022-10-03 DIAGNOSIS — F41.1 GAD (GENERALIZED ANXIETY DISORDER): ICD-10-CM

## 2022-10-03 PROCEDURE — 99214 OFFICE O/P EST MOD 30 MIN: CPT | Mod: 95,S$GLB,, | Performed by: NURSE PRACTITIONER

## 2022-10-03 PROCEDURE — 99999 PR PBB SHADOW E&M-EST. PATIENT-LVL II: ICD-10-PCS | Mod: PBBFAC,,, | Performed by: NURSE PRACTITIONER

## 2022-10-03 PROCEDURE — 99214 PR OFFICE/OUTPT VISIT, EST, LEVL IV, 30-39 MIN: ICD-10-PCS | Mod: 95,S$GLB,, | Performed by: NURSE PRACTITIONER

## 2022-10-03 PROCEDURE — 99999 PR PBB SHADOW E&M-EST. PATIENT-LVL II: CPT | Mod: PBBFAC,,, | Performed by: NURSE PRACTITIONER

## 2022-10-03 RX ORDER — DEXTROAMPHETAMINE SACCHARATE, AMPHETAMINE ASPARTATE, DEXTROAMPHETAMINE SULFATE AND AMPHETAMINE SULFATE 2.5; 2.5; 2.5; 2.5 MG/1; MG/1; MG/1; MG/1
10 TABLET ORAL 2 TIMES DAILY
Qty: 60 TABLET | Refills: 0 | Status: SHIPPED | OUTPATIENT
Start: 2022-10-03 | End: 2022-11-02

## 2022-10-03 RX ORDER — DEXTROAMPHETAMINE SACCHARATE, AMPHETAMINE ASPARTATE, DEXTROAMPHETAMINE SULFATE AND AMPHETAMINE SULFATE 2.5; 2.5; 2.5; 2.5 MG/1; MG/1; MG/1; MG/1
10 TABLET ORAL 2 TIMES DAILY
Qty: 60 TABLET | Refills: 0 | Status: SHIPPED | OUTPATIENT
Start: 2022-10-17 | End: 2022-11-19

## 2022-10-03 RX ORDER — DEXTROAMPHETAMINE SACCHARATE, AMPHETAMINE ASPARTATE, DEXTROAMPHETAMINE SULFATE AND AMPHETAMINE SULFATE 2.5; 2.5; 2.5; 2.5 MG/1; MG/1; MG/1; MG/1
10 TABLET ORAL 2 TIMES DAILY
Qty: 60 TABLET | Refills: 0 | Status: SHIPPED | OUTPATIENT
Start: 2022-11-15 | End: 2022-12-28 | Stop reason: SDUPTHER

## 2022-10-03 NOTE — PROGRESS NOTES
Outpatient Psychiatry Follow-Up Visit (MD/NP)    10/3/2022      The patient location is: [place], [place]  The chief complaint leading to consultation is: inattention, depression and anxiety    Visit type: audiovisual    Face to Face time with patient: 18  minutes  25 minutes of total time spent on the encounter, which includes face to face time and non-face to face time preparing to see the patient (eg, review of tests), Obtaining and/or reviewing separately obtained history, Documenting clinical information in the electronic or other health record, Independently interpreting results (not separately reported) and communicating results to the patient/family/caregiver, or Care coordination (not separately reported).         Each patient to whom he or she provides medical services by telemedicine is:  (1) informed of the relationship between the physician and patient and the respective role of any other health care provider with respect to management of the patient; and (2) notified that he or she may decline to receive medical services by telemedicine and may withdraw from such care at any time.      Clinical Status of Patient:  Outpatient (Ambulatory)    Chief Complaint:  Stormy Carlisle is a 42 y.o. female who presents today for follow-up of depression, anxiety and attention problems. She is taking  20 mg Prozac & adderall 10 mg BID for ADHD.    Current Psychiatric Medications:  Prozac 20 mg daily for mood  Adderall 10 mg PO BID  Xanax 0.25 mg po RPN (rarely takes for severe anxiety)    Interval History and Content of Current Session:    Patient stated that she has been doing well since her last visit. She stated she will be taking a family trip this week and is looking forward to it. She stated she is bisexual, her  is okay with it, and had a recent break up this weekend but still doing well. She reported improved relationship with her family. She stated she still feels anxious but still manageable with  "medication and coping strategies. She denied feeling depressed when she feels overwhelmed and stressed but finds swimming, gardening, and playing with her kids helpful in managing her depressive symptoms. She stated she only takes xanax 0.25 mg as needed for severe anxiety and panic attacks and does not take it often. She described her mood as "fine" and her affect was euthymic. She continues to report adderall 10 mg po BID is very helpful in increasing her focus, attention and productivity. She continues to repot good sleep and appetite. She continues to report improved depression and anxiety with Prozac 20 mg po daily and coping strategies. She denied SE and AE including jitteriness, CP, palpitations. She denied SI/HI/AVH. She denied hopelessness, amotvation, or anhedonia. No objective ssx of jonny or psychosis. Patient VS are within normal range and no history of hypertension. Encouraged patient to get her vitals at work    Past med trials: Wellbutrin (helped mood/motivation, but caused her to pass out, have decrease appetite, weight loss), Paxil was very hard to come off of and does not remember how much it helped. Prozac worked but was insufficiently helpful, venlafaxine (brain zaps and interfered with sleep) and vistaril (too groggy, sedating and made her feel tired that caused her to feel anxious and depressed)    Psychiatric Review Of Systems - Is patient experiencing or having changes in:  sleep: no  appetite: no  weight: no  energy/anergy: no  interest/pleasure/anhedonia: no  somatic symptoms: no  anxiety/panic: yes but manageable  guilty/hopelessness: no  concentration: no. Improved with adderall  S.I.B.s/risky behavior: no  Irritability: no  Racing thoughts: no  Impulsive behaviors: no  Paranoia:no  AVH:no  Suicidal thoughts/plan/intent: no    Review of Systems     MEDICAL REVIEW OF SYSTEMS  History obtained from the patient  General : NO chills or fever  Eyes: NO  visual changes  ENT: NO  nasal " "discharge or sore throat  Endocrine: NO weight changes  Dermatological: NO rashes  Respiratory: NO cough, shortness of breath  Cardiovascular: NO chest pain, palpitations or racing heart  Gastrointestinal: NO nausea, vomiting, constipation or diarrhea  Musculoskeletal: NO muscle pain or stiffness  Neurological: NO , dizziness, headaches or tremors  Psychiatric: please see HPI        Past Medical, Family and Social History: The patient's past medical, family and social history have been reviewed and updated as appropriate within the electronic medical record - see encounter notes.  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 Has been through CBT but not recently. Med trials as above plus ambien per chart in 2015    Compliance: yes    Side effects: None    Risk Parameters:  Patient reports no suicidal ideation  Patient reports no homicidal ideation  Patient reports no self-injurious behavior  Patient reports no violent behavior    Exam (detailed: at least 9 elements; comprehensive: all 15 elements)   Constitutional  Vitals:  Most recent vital signs, dated greater than 90 days prior to this appointment, were reviewed.   There were no vitals filed for this visit.   General:  unremarkable, age appropriate     Musculoskeletal  Muscle Strength/Tone:  no dystonia, no tremor, no tic   Gait & Station:  Normal gait     Psychiatric  Speech:  no latency; no press, spontaneous,    Mood & Affect:  "Fine"  euthymic   Thought Process:  normal and logical, logical   Associations:  intact   Thought Content:  normal, no suicidality, no homicidality, delusions, or paranoia   Insight:  intact, has awareness of illness   Judgement: behavior is adequate to circumstances, age appropriate   Orientation:  grossly intact   Memory: intact for content of interview, able to remember recent events- yes, able to remember remote events- yes   Language: grossly intact   Attention " Span & Concentration:  able to focus   Fund of Knowledge:  intact and appropriate to age and level of education     Assessment and Diagnosis   Status/Progress: Based on the examination today, the patient's problem(s) is/are not well controlled.  New problems have not been presented today.   Co-morbidities, Diagnostic uncertainty and Lack of compliance are not complicating management of the primary condition.  There are no active rule-out diagnoses for this patient at this time.     General Impression: Doing well.      ICD-10-CM ICD-9-CM   1. MDD (major depressive disorder), recurrent, in partial remission  F33.41 296.35   2. Attention deficit hyperactivity disorder (ADHD), predominantly inattentive type  F90.0 314.00   3. JAMEY (generalized anxiety disorder)  F41.1 300.02          Intervention/Counseling/Treatment Plan     Depression & Anxiety  Continue prozac 20 mg to help with depressive sx  Continue xanax 0.25 mg po PRN daily for severe anxiety and panic attacks (rarely takes it)  Reviewed patient's vitals and encouraged patient to get her vitals at the office and she agreed.       ADHD, inattentive type  Continue Adderall IR 10 mg PO BID      -Recommended psychotherapy     Previously discussed risks, benefits, AE's (incl serotonin syndrome) SE's (including but not limited to GI sx, insomnia, agitation/anxiety/activation, appetite changes, HA, hypotension, others) inherent unpredictability of illness and treatment option    Discussed informed consent, diagnosis, risks and benefits of proposed treatment above vs alternative treatments vs no treatment, and potential side effects of these treatments. The patient expresses understanding of the above and displays the capacity to agree with this treatment given said understanding. Patient also agrees that, currently, the benefits outweigh the risks and would like to pursue treatment at this time. Answered all questions and discussed follow up. Encouraged patient to  contact us with any questions or concerns.    Encouraged Patient to keep future appointment    Take medications as prescribed     Patient to present to ED for thoughts to harm herself or others        Return to Clinic: 3 months or sooner if needed    BREANNA Slade-BC

## 2022-10-18 ENCOUNTER — PATIENT MESSAGE (OUTPATIENT)
Dept: PSYCHIATRY | Facility: CLINIC | Age: 42
End: 2022-10-18
Payer: COMMERCIAL

## 2022-10-31 ENCOUNTER — PATIENT MESSAGE (OUTPATIENT)
Dept: PSYCHIATRY | Facility: CLINIC | Age: 42
End: 2022-10-31
Payer: COMMERCIAL

## 2022-10-31 ENCOUNTER — OFFICE VISIT (OUTPATIENT)
Dept: OBSTETRICS AND GYNECOLOGY | Facility: CLINIC | Age: 42
End: 2022-10-31
Attending: OBSTETRICS & GYNECOLOGY
Payer: COMMERCIAL

## 2022-10-31 DIAGNOSIS — Z12.31 VISIT FOR SCREENING MAMMOGRAM: ICD-10-CM

## 2022-10-31 DIAGNOSIS — Z01.419 WELL WOMAN EXAM WITH ROUTINE GYNECOLOGICAL EXAM: Primary | ICD-10-CM

## 2022-10-31 DIAGNOSIS — Z30.011 ENCOUNTER FOR INITIAL PRESCRIPTION OF CONTRACEPTIVE PILLS: ICD-10-CM

## 2022-10-31 DIAGNOSIS — Z23 NEED FOR HPV VACCINATION: ICD-10-CM

## 2022-10-31 PROCEDURE — 99396 PR PREVENTIVE VISIT,EST,40-64: ICD-10-PCS | Mod: S$GLB,,, | Performed by: NURSE PRACTITIONER

## 2022-10-31 PROCEDURE — 99396 PREV VISIT EST AGE 40-64: CPT | Mod: S$GLB,,, | Performed by: NURSE PRACTITIONER

## 2022-10-31 PROCEDURE — 1159F MED LIST DOCD IN RCRD: CPT | Mod: CPTII,S$GLB,, | Performed by: NURSE PRACTITIONER

## 2022-10-31 PROCEDURE — 1159F PR MEDICATION LIST DOCUMENTED IN MEDICAL RECORD: ICD-10-PCS | Mod: CPTII,S$GLB,, | Performed by: NURSE PRACTITIONER

## 2022-10-31 PROCEDURE — 99999 PR PBB SHADOW E&M-EST. PATIENT-LVL II: ICD-10-PCS | Mod: PBBFAC,,, | Performed by: NURSE PRACTITIONER

## 2022-10-31 PROCEDURE — 99999 PR PBB SHADOW E&M-EST. PATIENT-LVL II: CPT | Mod: PBBFAC,,, | Performed by: NURSE PRACTITIONER

## 2022-10-31 RX ORDER — NORGESTIMATE AND ETHINYL ESTRADIOL 7DAYSX3 28
1 KIT ORAL DAILY
Qty: 28 TABLET | Refills: 1 | Status: SHIPPED | OUTPATIENT
Start: 2022-10-31 | End: 2023-05-08 | Stop reason: SDUPTHER

## 2022-10-31 NOTE — PROGRESS NOTES
CC: Annual  HPI: Pt is a 42 y.o.  female who presents for routine annual exam. New to me- last saw Dr. Toledo. She uses nothing for contraception- partner had a vasectomy. She does not want STD screening. Wants to resume ocps due to cramping and periods being heavier- did well on ocps in the past. Bleeds x 5-6 days, not heavy the entire time. Notices mood swings week prior to menses, already on Prozac. Gets mild lower back pain and cramping with periods.     FH:   Breast cancer: none  Colon cancer: none  Ovarian cancer: none  Uterine cancer: none    HPV vaccine: no  Last pap smear:  nilm, hpv negative  History of abnormal pap smears: yes    Colonoscopy: na  DEXA: na  Mammogram: due- never had one  STD history: no  Birth control: none  OB history:   Tobacco use: no    ROS:  GENERAL: Feeling well overall. Denies fever or chills.   SKIN: Denies rash or lesions.   HEAD: Denies head injury or headache.   NODES: Denies enlarged lymph nodes.   CHEST: Denies chest pain or shortness of breath.   CARDIOVASCULAR: Denies palpitations or left sided chest pain.   ABDOMEN: No abdominal pain, constipation, diarrhea, nausea, vomiting or rectal bleeding.   URINARY: No dysuria, hematuria, or burning on urination.  REPRODUCTIVE: See HPI.   BREASTS: Denies pain, lumps, or nipple discharge.   HEMATOLOGIC: No easy bruisability or excessive bleeding.   MUSCULOSKELETAL: Denies joint pain or swelling.   NEUROLOGIC: Denies syncope or weakness.   PSYCHIATRIC: Denies depression, anxiety or mood swings.    PE:   APPEARANCE: Well nourished, well developed, White female in no acute distress.  NODES: no cervical, supraclavicular, or inguinal lymphadenopathy  BREASTS: Symmetrical, no skin changes or visible lesions. No palpable masses, nipple discharge or adenopathy bilaterally.  ABDOMEN: Soft. No tenderness or masses. No distention. No hernias palpated. No CVA tenderness.  VULVA: No lesions. Normal external female genitalia.  URETHRAL MEATUS:  Normal size and location, no lesions, no prolapse.  URETHRA: No masses, tenderness, or prolapse.  VAGINA: Moist. No lesions or lacerations noted. No abnormal discharge present. No odor present.   CERVIX: No lesions or discharge. No cervical motion tenderness.   UTERUS: Normal size, regular shape, mobile, non-tender.  ADNEXA: No tenderness. No fullness or masses palpated in the adnexal regions.   ANUS PERINEUM: Normal.      Diagnosis:  1. Well woman exam with routine gynecological exam    2. Visit for screening mammogram    3. Encounter for initial prescription of contraceptive pills    4. Need for HPV vaccination        Plan:     Orders Placed This Encounter    Mammo Digital Screening Bilat    (In Office Administered) HPV Vaccine (9-Valent) (3 Dose) (IM)    norgestimate-ethinyl estradioL (ORTHO TRI-CYCLEN,TRI-SPRINTEC) 0.18/0.215/0.25 mg-35 mcg (28) tablet     Mammogram due  Pap current  Rx ocps  HPV vaccine dose 1 scheduled    Patient was counseled today on the new ACS guidelines for cervical cytology screening as well as the current recommendations for breast cancer screening. She was counseled to follow up with her PCP for other routine health maintenance. Counseling session lasted approximately 10 minutes, and all her questions were answered.  For women over the age of 65, you can stop having cervical cancer screenings if you have never had abnormal cervical cells or cervical cancer, and youve had three negative Pap tests in a row. (You also can stop screening if youve had two negative Pap and HPV tests in a row in the past 10 years, with at least one test in the past 5 years.),    Follow-up with me in 1 year for routine exam; pap in 2 years.     I spent a total of 20 minutes on the day of the visit.This includes face to face time and non-face to face time preparing to see the patient (eg, review of tests), obtaining and/or reviewing separately obtained history, documenting clinical information in the  electronic or other health record, independently interpreting results and communicating results to the patient/family/caregiver, or care coordinator.

## 2022-11-28 ENCOUNTER — PATIENT MESSAGE (OUTPATIENT)
Dept: PSYCHIATRY | Facility: CLINIC | Age: 42
End: 2022-11-28
Payer: COMMERCIAL

## 2022-12-28 ENCOUNTER — PATIENT MESSAGE (OUTPATIENT)
Dept: PSYCHIATRY | Facility: CLINIC | Age: 42
End: 2022-12-28
Payer: COMMERCIAL

## 2022-12-28 DIAGNOSIS — F90.0 ATTENTION DEFICIT HYPERACTIVITY DISORDER (ADHD), PREDOMINANTLY INATTENTIVE TYPE: ICD-10-CM

## 2022-12-28 RX ORDER — DEXTROAMPHETAMINE SACCHARATE, AMPHETAMINE ASPARTATE, DEXTROAMPHETAMINE SULFATE AND AMPHETAMINE SULFATE 2.5; 2.5; 2.5; 2.5 MG/1; MG/1; MG/1; MG/1
10 TABLET ORAL 2 TIMES DAILY
Qty: 60 TABLET | Refills: 0 | Status: SHIPPED | OUTPATIENT
Start: 2022-12-28 | End: 2023-01-10 | Stop reason: SDUPTHER

## 2022-12-29 ENCOUNTER — PATIENT MESSAGE (OUTPATIENT)
Dept: PSYCHIATRY | Facility: CLINIC | Age: 42
End: 2022-12-29
Payer: COMMERCIAL

## 2023-01-10 ENCOUNTER — OFFICE VISIT (OUTPATIENT)
Dept: PSYCHIATRY | Facility: CLINIC | Age: 43
End: 2023-01-10
Payer: COMMERCIAL

## 2023-01-10 VITALS
DIASTOLIC BLOOD PRESSURE: 66 MMHG | WEIGHT: 127.19 LBS | SYSTOLIC BLOOD PRESSURE: 102 MMHG | HEART RATE: 79 BPM | BODY MASS INDEX: 24.04 KG/M2

## 2023-01-10 DIAGNOSIS — F41.1 GAD (GENERALIZED ANXIETY DISORDER): Primary | ICD-10-CM

## 2023-01-10 DIAGNOSIS — F90.0 ATTENTION DEFICIT HYPERACTIVITY DISORDER (ADHD), PREDOMINANTLY INATTENTIVE TYPE: ICD-10-CM

## 2023-01-10 DIAGNOSIS — F33.41 MDD (MAJOR DEPRESSIVE DISORDER), RECURRENT, IN PARTIAL REMISSION: ICD-10-CM

## 2023-01-10 PROCEDURE — 99214 PR OFFICE/OUTPT VISIT, EST, LEVL IV, 30-39 MIN: ICD-10-PCS | Mod: S$GLB,,, | Performed by: NURSE PRACTITIONER

## 2023-01-10 PROCEDURE — 3078F PR MOST RECENT DIASTOLIC BLOOD PRESSURE < 80 MM HG: ICD-10-PCS | Mod: CPTII,S$GLB,, | Performed by: NURSE PRACTITIONER

## 2023-01-10 PROCEDURE — 3078F DIAST BP <80 MM HG: CPT | Mod: CPTII,S$GLB,, | Performed by: NURSE PRACTITIONER

## 2023-01-10 PROCEDURE — 99999 PR PBB SHADOW E&M-EST. PATIENT-LVL III: ICD-10-PCS | Mod: PBBFAC,,, | Performed by: NURSE PRACTITIONER

## 2023-01-10 PROCEDURE — 3074F PR MOST RECENT SYSTOLIC BLOOD PRESSURE < 130 MM HG: ICD-10-PCS | Mod: CPTII,S$GLB,, | Performed by: NURSE PRACTITIONER

## 2023-01-10 PROCEDURE — 3008F PR BODY MASS INDEX (BMI) DOCUMENTED: ICD-10-PCS | Mod: CPTII,S$GLB,, | Performed by: NURSE PRACTITIONER

## 2023-01-10 PROCEDURE — 3074F SYST BP LT 130 MM HG: CPT | Mod: CPTII,S$GLB,, | Performed by: NURSE PRACTITIONER

## 2023-01-10 PROCEDURE — 99999 PR PBB SHADOW E&M-EST. PATIENT-LVL III: CPT | Mod: PBBFAC,,, | Performed by: NURSE PRACTITIONER

## 2023-01-10 PROCEDURE — 99214 OFFICE O/P EST MOD 30 MIN: CPT | Mod: S$GLB,,, | Performed by: NURSE PRACTITIONER

## 2023-01-10 PROCEDURE — 3008F BODY MASS INDEX DOCD: CPT | Mod: CPTII,S$GLB,, | Performed by: NURSE PRACTITIONER

## 2023-01-10 RX ORDER — FLUOXETINE HYDROCHLORIDE 20 MG/1
CAPSULE ORAL
Qty: 30 CAPSULE | Refills: 3 | Status: SHIPPED | OUTPATIENT
Start: 2023-01-10 | End: 2023-01-30

## 2023-01-10 RX ORDER — DEXTROAMPHETAMINE SACCHARATE, AMPHETAMINE ASPARTATE, DEXTROAMPHETAMINE SULFATE AND AMPHETAMINE SULFATE 2.5; 2.5; 2.5; 2.5 MG/1; MG/1; MG/1; MG/1
10 TABLET ORAL 2 TIMES DAILY
Qty: 60 TABLET | Refills: 0 | Status: SHIPPED | OUTPATIENT
Start: 2023-01-25 | End: 2023-01-26 | Stop reason: SDUPTHER

## 2023-01-10 RX ORDER — DEXTROAMPHETAMINE SACCHARATE, AMPHETAMINE ASPARTATE, DEXTROAMPHETAMINE SULFATE AND AMPHETAMINE SULFATE 2.5; 2.5; 2.5; 2.5 MG/1; MG/1; MG/1; MG/1
10 TABLET ORAL 2 TIMES DAILY
Qty: 60 TABLET | Refills: 0 | Status: SHIPPED | OUTPATIENT
Start: 2023-01-10 | End: 2023-02-09

## 2023-01-10 RX ORDER — DEXTROAMPHETAMINE SACCHARATE, AMPHETAMINE ASPARTATE, DEXTROAMPHETAMINE SULFATE AND AMPHETAMINE SULFATE 2.5; 2.5; 2.5; 2.5 MG/1; MG/1; MG/1; MG/1
10 TABLET ORAL 2 TIMES DAILY
Qty: 60 TABLET | Refills: 0 | Status: SHIPPED | OUTPATIENT
Start: 2023-02-23 | End: 2023-02-28 | Stop reason: SDUPTHER

## 2023-01-10 NOTE — PROGRESS NOTES
"Outpatient Psychiatry Follow-Up Visit (MD/NP)    1/10/2023      Clinical Status of Patient:  Outpatient (Ambulatory)    Chief Complaint:  Stormy Carlisle is a 42 y.o. female who presents today for follow-up of depression, anxiety and attention problems. She is taking  20 mg Prozac & adderall 10 mg BID for ADHD.    Current Psychiatric Medications:  Prozac 20 mg daily for mood  Adderall 10 mg PO BID  Xanax 0.25 mg po RPN (rarely takes for severe anxiety)    Interval History and Content of Current Session:    Patient stated that she has been doing well since her last visit. She stated she took a family trip to see her family in [place] and enjoyed it. She stated she still feels anxious but still manageable with medication and coping strategies. She denied feeling depressed and when she is feeling stressed she utilizes healthy coping skills like swimming, gardening, and playing with her kids. She described her mood as "fine" and her affect was appropriate and mood congruent. She continues to report adderall 10 mg po BID is very helpful in increasing her focus, attention and productivity. She continues to repot good sleep and appetite. She continues to report improved depression and anxiety with Prozac 20 mg po daily and coping strategies. She denied SE and AE including jitteriness, CP, or palpitations. She denied SI/HI/AVH. She denied hopelessness, amotvation, or anhedonia. No objective signs or symptoms of jonny or psychosis. Patient VS are within normal range and no history of hypertension.     Past med trials: Wellbutrin (helped mood/motivation, but caused her to pass out, have decrease appetite, weight loss), Paxil was very hard to come off of and does not remember how much it helped. Prozac worked but was insufficiently helpful, venlafaxine (brain zaps and interfered with sleep) and vistaril (too groggy, sedating and made her feel tired that caused her to feel anxious and depressed)    Psychiatric Review Of Systems - Is " patient experiencing or having changes in:  sleep: no  appetite: no  weight: no  energy/anergy: no  interest/pleasure/anhedonia: no  somatic symptoms: no  anxiety/panic: yes but manageable  guilty/hopelessness: no  concentration: no. Improved with adderall  S.I.B.s/risky behavior: no  Irritability: no  Racing thoughts: no  Impulsive behaviors: no  Paranoia:no  AVH:no  Suicidal thoughts/plan/intent: no  SE: no  ETOH: no  Drugs: no    Review of Systems     MEDICAL REVIEW OF SYSTEMS  History obtained from the patient  General : NO chills or fever  Eyes: NO  visual changes  ENT: NO  nasal discharge or sore throat  Endocrine: NO weight changes  Dermatological: NO rashes  Respiratory: NO cough, shortness of breath  Cardiovascular: NO chest pain, palpitations or racing heart  Gastrointestinal: NO nausea, vomiting, constipation or diarrhea  Musculoskeletal: NO muscle pain or stiffness  Neurological: NO , dizziness, headaches or tremors  Psychiatric: please see HPI        Past Medical, Family and Social History: The patient's past medical, family and social history have been reviewed and updated as appropriate within the electronic medical record - see encounter notes.  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 Has been through CBT but not recently. Med trials as above plus ambien per chart in 2015    Compliance: yes    Side effects: None    Risk Parameters:  Patient reports no suicidal ideation  Patient reports no homicidal ideation  Patient reports no self-injurious behavior  Patient reports no violent behavior    Exam (detailed: at least 9 elements; comprehensive: all 15 elements)   Constitutional  Vitals:  Most recent vital signs, dated greater than 90 days prior to this appointment, were reviewed.   Vitals:    01/10/23 0803   BP: 102/66   Pulse: 79   Weight: 57.7 kg (127 lb 3.3 oz)      General:  unremarkable, age appropriate  "    Musculoskeletal  Muscle Strength/Tone:  no dystonia, no tremor, no tic   Gait & Station:  Normal gait     Psychiatric  Speech:  no latency; no press, spontaneous,    Mood & Affect:  "Fine"  Mood congruent and appropriate   Thought Process:  normal and logical, logical   Associations:  intact   Thought Content:  normal, no suicidality, no homicidality, delusions, or paranoia   Insight:  intact, has awareness of illness   Judgement: behavior is adequate to circumstances, age appropriate   Orientation:  grossly intact   Memory: intact for content of interview, able to remember recent events- yes, able to remember remote events- yes   Language: grossly intact, able to name, able to repeat   Attention Span & Concentration:  able to focus with adderall   Fund of Knowledge:  intact and appropriate to age and level of education     Assessment and Diagnosis   Status/Progress: Based on the examination today, the patient's problem(s) is/are not well controlled.  New problems have not been presented today.   Co-morbidities, Diagnostic uncertainty and Lack of compliance are not complicating management of the primary condition.  There are no active rule-out diagnoses for this patient at this time.     General Impression: Doing well.      ICD-10-CM ICD-9-CM   1. JAMEY (generalized anxiety disorder)  F41.1 300.02   2. Attention deficit hyperactivity disorder (ADHD), predominantly inattentive type  F90.0 314.00   3. MDD (major depressive disorder), recurrent, in partial remission  F33.41 296.35          Intervention/Counseling/Treatment Plan     Depression & Anxiety  Continue prozac 20 mg to help with depressive sx  Continue xanax 0.25 mg po PRN daily for severe anxiety and panic attacks (rarely takes it)  Reviewed patient's vitals       ADHD, inattentive type  Continue Adderall IR 10 mg PO BID. Checked  and no signs of misuse.      -Recommended psychotherapy     Previously discussed risks, benefits, AE's (incl serotonin " syndrome) SE's (including but not limited to GI sx, insomnia, agitation/anxiety/activation, appetite changes, HA, hypotension, others) inherent unpredictability of illness and treatment option    Discussed informed consent, diagnosis, risks and benefits of proposed treatment above vs alternative treatments vs no treatment, and potential side effects of these treatments. The patient expresses understanding of the above and displays the capacity to agree with this treatment given said understanding. Patient also agrees that, currently, the benefits outweigh the risks and would like to pursue treatment at this time. Answered all questions and discussed follow up. Encouraged patient to contact us with any questions or concerns.    Encouraged Patient to keep future appointment    Take medications as prescribed     Patient to present to ED for thoughts to harm herself or others        Return to Clinic: 3 months or sooner if needed    Gina Murphy, JOSE MANUELP-BC

## 2023-01-26 ENCOUNTER — PATIENT MESSAGE (OUTPATIENT)
Dept: PSYCHIATRY | Facility: CLINIC | Age: 43
End: 2023-01-26
Payer: COMMERCIAL

## 2023-01-26 DIAGNOSIS — F90.0 ATTENTION DEFICIT HYPERACTIVITY DISORDER (ADHD), PREDOMINANTLY INATTENTIVE TYPE: ICD-10-CM

## 2023-01-26 RX ORDER — DEXTROAMPHETAMINE SACCHARATE, AMPHETAMINE ASPARTATE, DEXTROAMPHETAMINE SULFATE AND AMPHETAMINE SULFATE 2.5; 2.5; 2.5; 2.5 MG/1; MG/1; MG/1; MG/1
10 TABLET ORAL 2 TIMES DAILY
Qty: 60 TABLET | Refills: 0 | Status: SHIPPED | OUTPATIENT
Start: 2023-01-26 | End: 2023-03-01

## 2023-02-28 ENCOUNTER — PATIENT MESSAGE (OUTPATIENT)
Dept: PSYCHIATRY | Facility: CLINIC | Age: 43
End: 2023-02-28
Payer: COMMERCIAL

## 2023-02-28 DIAGNOSIS — F90.0 ATTENTION DEFICIT HYPERACTIVITY DISORDER (ADHD), PREDOMINANTLY INATTENTIVE TYPE: ICD-10-CM

## 2023-02-28 DIAGNOSIS — F90.0 ATTENTION DEFICIT HYPERACTIVITY DISORDER (ADHD), PREDOMINANTLY INATTENTIVE TYPE: Primary | ICD-10-CM

## 2023-02-28 RX ORDER — DEXTROAMPHETAMINE SACCHARATE, AMPHETAMINE ASPARTATE, DEXTROAMPHETAMINE SULFATE AND AMPHETAMINE SULFATE 2.5; 2.5; 2.5; 2.5 MG/1; MG/1; MG/1; MG/1
10 TABLET ORAL 2 TIMES DAILY
Qty: 60 TABLET | Refills: 0 | Status: SHIPPED | OUTPATIENT
Start: 2023-02-28 | End: 2023-02-28

## 2023-02-28 RX ORDER — DEXTROAMPHETAMINE SACCHARATE, AMPHETAMINE ASPARTATE, DEXTROAMPHETAMINE SULFATE AND AMPHETAMINE SULFATE 3.75; 3.75; 3.75; 3.75 MG/1; MG/1; MG/1; MG/1
15 TABLET ORAL DAILY
Qty: 30 TABLET | Refills: 0 | Status: SHIPPED | OUTPATIENT
Start: 2023-02-28 | End: 2023-03-27 | Stop reason: SDUPTHER

## 2023-03-23 ENCOUNTER — OFFICE VISIT (OUTPATIENT)
Dept: PSYCHIATRY | Facility: CLINIC | Age: 43
End: 2023-03-23
Payer: COMMERCIAL

## 2023-03-23 DIAGNOSIS — F90.0 ATTENTION DEFICIT HYPERACTIVITY DISORDER (ADHD), PREDOMINANTLY INATTENTIVE TYPE: Primary | ICD-10-CM

## 2023-03-23 DIAGNOSIS — F41.1 GAD (GENERALIZED ANXIETY DISORDER): ICD-10-CM

## 2023-03-23 PROCEDURE — 99214 OFFICE O/P EST MOD 30 MIN: CPT | Mod: 95,,, | Performed by: NURSE PRACTITIONER

## 2023-03-23 PROCEDURE — 99214 PR OFFICE/OUTPT VISIT, EST, LEVL IV, 30-39 MIN: ICD-10-PCS | Mod: 95,,, | Performed by: NURSE PRACTITIONER

## 2023-03-23 RX ORDER — DEXTROAMPHETAMINE SACCHARATE, AMPHETAMINE ASPARTATE, DEXTROAMPHETAMINE SULFATE AND AMPHETAMINE SULFATE 2.5; 2.5; 2.5; 2.5 MG/1; MG/1; MG/1; MG/1
10 TABLET ORAL 2 TIMES DAILY
Qty: 60 TABLET | Refills: 0 | Status: SHIPPED | OUTPATIENT
Start: 2023-05-23 | End: 2023-04-26

## 2023-03-23 RX ORDER — DEXTROAMPHETAMINE SACCHARATE, AMPHETAMINE ASPARTATE, DEXTROAMPHETAMINE SULFATE AND AMPHETAMINE SULFATE 2.5; 2.5; 2.5; 2.5 MG/1; MG/1; MG/1; MG/1
10 TABLET ORAL 2 TIMES DAILY
Qty: 60 TABLET | Refills: 0 | Status: SHIPPED | OUTPATIENT
Start: 2023-03-23 | End: 2023-03-27

## 2023-03-23 RX ORDER — DEXTROAMPHETAMINE SACCHARATE, AMPHETAMINE ASPARTATE, DEXTROAMPHETAMINE SULFATE AND AMPHETAMINE SULFATE 2.5; 2.5; 2.5; 2.5 MG/1; MG/1; MG/1; MG/1
10 TABLET ORAL 2 TIMES DAILY
Qty: 60 TABLET | Refills: 0 | Status: SHIPPED | OUTPATIENT
Start: 2023-04-25 | End: 2023-04-26

## 2023-03-23 NOTE — PROGRESS NOTES
"Outpatient Psychiatry Follow-Up Visit (MD/NP)    3/23/2023      The patient location is: [place], [place]  The chief complaint leading to consultation is: inattention, depression and anxiety    Visit type: audiovisual      Clinical Status of Patient:  Outpatient (Ambulatory)    Chief Complaint:  Stormy Carlisle is a 43 y.o. female who presents today for follow-up of depression, anxiety and attention problems. She is taking  20 mg Prozac & adderall 10 mg BID for ADHD.    Current Psychiatric Medications:  Prozac 20 mg daily for mood  Adderall 10 mg PO BID  Xanax 0.25 mg po RPN (rarely takes for severe anxiety)    Interval History and Content of Current Session:    Patient stated that she has been doing well since her last visit. She stated her family is doing well and they are planning a family vacation to [place] She reported manageable anxiety with prozac and her coping strategies. She denied feeling depressed and when she feels stressed she utilizes healthy coping skills like swimming, gardening, and playing with her kids. She described her mood as "even" and her affect was appropriate and mood congruent. She continues to report adderall 10 mg po BID is very helpful in increasing her focus, attention and productivity. She continues to report good sleep and appetite. She continues to report managed depression and anxiety with Prozac 20 mg po daily and coping strategies. She denied SE and AE including jitteriness, CP, or palpitations. She denied SI/HI/AVH. She denied hopelessness, amotvation, or anhedonia. No objective signs or symptoms of jonny or psychosis. Patient VS are within normal range and no history of hypertension.     Past med trials: Wellbutrin (helped mood/motivation, but caused her to pass out, have decrease appetite, weight loss), Paxil was very hard to come off of and does not remember how much it helped. Prozac worked but was insufficiently helpful, venlafaxine (brain zaps and interfered with sleep) and " vistaril (too groggy, sedating and made her feel tired that caused her to feel anxious and depressed)    Psychiatric Review Of Systems - Is patient experiencing or having changes in:  sleep: no  appetite: no  weight: no  energy/anergy: no  interest/pleasure/anhedonia: no  somatic symptoms: no  anxiety/panic: yes but manageable  guilty/hopelessness: no  concentration: no. Improved with adderall  S.I.B.s/risky behavior: no  Irritability: no  Racing thoughts: no  Impulsive behaviors: no  Paranoia:no  AVH:no  Suicidal thoughts/plan/intent: no  SE: no  ETOH: no  Drugs: no    Review of Systems     MEDICAL REVIEW OF SYSTEMS  History obtained from the patient  General : NO chills or fever  Eyes: NO  visual changes  ENT: NO  nasal discharge or sore throat  Endocrine: NO weight changes  Dermatological: NO rashes  Respiratory: NO cough, shortness of breath  Cardiovascular: NO chest pain, palpitations or racing heart  Gastrointestinal: NO nausea, vomiting, constipation or diarrhea  Musculoskeletal: NO muscle pain or stiffness  Neurological: NO , dizziness, headaches or tremors  Psychiatric: please see HPI        Past Medical, Family and Social History: The patient's past medical, family and social history have been reviewed and updated as appropriate within the electronic medical record - see encounter notes.  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 Has been through CBT but not recently. Med trials as above plus ambien per chart in 2015    Compliance: yes    Side effects: None    Risk Parameters:  Patient reports no suicidal ideation  Patient reports no homicidal ideation  Patient reports no self-injurious behavior  Patient reports no violent behavior    Exam (detailed: at least 9 elements; comprehensive: all 15 elements)   Constitutional  Vitals:  Most recent vital signs, dated greater than 90 days prior to this appointment, were reviewed.   There were  "no vitals filed for this visit.     General:  unremarkable, age appropriate     Musculoskeletal  Muscle Strength/Tone:  no dystonia, no tremor, no tic   Gait & Station:  Normal gait     Psychiatric  Speech:  no latency; no press, spontaneous,    Mood & Affect:  "even"  Mood congruent and appropriate   Thought Process:  normal and logical, logical   Associations:  intact   Thought Content:  normal, no suicidality, no homicidality, delusions, or paranoia   Insight:  intact, has awareness of illness   Judgement: behavior is adequate to circumstances, age appropriate   Orientation:  grossly intact   Memory: intact for content of interview, able to remember recent events- yes, able to remember remote events- yes   Language: grossly intact, able to name, able to repeat   Attention Span & Concentration:  able to focus with adderall   Fund of Knowledge:  intact and appropriate to age and level of education     Assessment and Diagnosis   Status/Progress: Based on the examination today, the patient's problem(s) is/are not well controlled.  New problems have not been presented today.   Co-morbidities, Diagnostic uncertainty and Lack of compliance are not complicating management of the primary condition.  There are no active rule-out diagnoses for this patient at this time.     General Impression: Doing well on her current medication regimen and no changes in dosages due to their effectiveness.       ICD-10-CM ICD-9-CM   1. Attention deficit hyperactivity disorder (ADHD), predominantly inattentive type  F90.0 314.00            Intervention/Counseling/Treatment Plan     Depression & Anxiety  Continue prozac 20 mg to help with depressive sx  Continue xanax 0.25 mg po PRN daily for severe anxiety and panic attacks (rarely takes it)  Informed pt of the risks of continuous Benzodiazepine use including tolerance, dependence, dizziness, drowsiness, memory problems, and withdrawals that may be life threatening upon abrupt cessation. " Also advised not to take Benzodiazepines with Opiates or other sedatives and also not to drive or operate heavy machinery while using Benzodiazepines. Avoid alcohol with Benzodiazapine. Patient agreed to take it and verbalized understanding    Reviewed patient's vitals       ADHD, inattentive type  Continue Adderall IR 10 mg PO BID. Checked  and no signs of misuse. Patent filled adderall 15 mg po daily due to stimulant shortage but wants to continue taking adderall 10 mg po BID.       -Recommended psychotherapy     Previously discussed risks, benefits, AE's (incl serotonin syndrome) SE's (including but not limited to GI sx, insomnia, agitation/anxiety/activation, appetite changes, HA, hypotension, others) inherent unpredictability of illness and treatment option    Discussed informed consent, diagnosis, risks and benefits of proposed treatment above vs alternative treatments vs no treatment, and potential side effects of these treatments. The patient expresses understanding of the above and displays the capacity to agree with this treatment given said understanding. Patient also agrees that, currently, the benefits outweigh the risks and would like to pursue treatment at this time. Answered all questions and discussed follow up. Encouraged patient to contact us with any questions or concerns.    Encouraged Patient to keep future appointment    Take medications as prescribed     Patient to present to ED for thoughts to harm herself or others        Return to Clinic: 3 months or sooner if needed    BREANNA Slade-BC

## 2023-03-27 ENCOUNTER — PATIENT MESSAGE (OUTPATIENT)
Dept: PSYCHIATRY | Facility: CLINIC | Age: 43
End: 2023-03-27
Payer: COMMERCIAL

## 2023-03-27 DIAGNOSIS — F90.0 ATTENTION DEFICIT HYPERACTIVITY DISORDER (ADHD), PREDOMINANTLY INATTENTIVE TYPE: Primary | ICD-10-CM

## 2023-03-27 RX ORDER — DEXTROAMPHETAMINE SACCHARATE, AMPHETAMINE ASPARTATE, DEXTROAMPHETAMINE SULFATE AND AMPHETAMINE SULFATE 3.75; 3.75; 3.75; 3.75 MG/1; MG/1; MG/1; MG/1
15 TABLET ORAL DAILY
Qty: 30 TABLET | Refills: 0 | Status: SHIPPED | OUTPATIENT
Start: 2023-03-27 | End: 2023-03-28 | Stop reason: SDUPTHER

## 2023-03-28 ENCOUNTER — PATIENT MESSAGE (OUTPATIENT)
Dept: PSYCHIATRY | Facility: CLINIC | Age: 43
End: 2023-03-28
Payer: COMMERCIAL

## 2023-03-28 DIAGNOSIS — F90.0 ATTENTION DEFICIT HYPERACTIVITY DISORDER (ADHD), PREDOMINANTLY INATTENTIVE TYPE: ICD-10-CM

## 2023-03-28 RX ORDER — DEXTROAMPHETAMINE SACCHARATE, AMPHETAMINE ASPARTATE, DEXTROAMPHETAMINE SULFATE AND AMPHETAMINE SULFATE 3.75; 3.75; 3.75; 3.75 MG/1; MG/1; MG/1; MG/1
15 TABLET ORAL DAILY
Qty: 30 TABLET | Refills: 0 | Status: SHIPPED | OUTPATIENT
Start: 2023-03-28 | End: 2023-04-26 | Stop reason: SDUPTHER

## 2023-04-11 ENCOUNTER — TELEPHONE (OUTPATIENT)
Dept: PSYCHIATRY | Facility: CLINIC | Age: 43
End: 2023-04-11
Payer: COMMERCIAL
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
## 2023-04-18 ENCOUNTER — OFFICE VISIT (OUTPATIENT)
Dept: PSYCHIATRY | Facility: CLINIC | Age: 43
End: 2023-04-18
Payer: COMMERCIAL

## 2023-04-18 DIAGNOSIS — F41.9 ANXIETY: Chronic | ICD-10-CM

## 2023-04-18 DIAGNOSIS — F33.1 MODERATE EPISODE OF RECURRENT MAJOR DEPRESSIVE DISORDER: ICD-10-CM

## 2023-04-18 DIAGNOSIS — F90.0 ATTENTION DEFICIT HYPERACTIVITY DISORDER (ADHD), PREDOMINANTLY INATTENTIVE TYPE: Chronic | ICD-10-CM

## 2023-04-18 DIAGNOSIS — F41.1 GAD (GENERALIZED ANXIETY DISORDER): Primary | ICD-10-CM

## 2023-04-18 PROCEDURE — 99999 PR PBB SHADOW E&M-EST. PATIENT-LVL I: CPT | Mod: PBBFAC,,, | Performed by: SOCIAL WORKER

## 2023-04-18 PROCEDURE — 1159F PR MEDICATION LIST DOCUMENTED IN MEDICAL RECORD: ICD-10-PCS | Mod: CPTII,S$GLB,, | Performed by: SOCIAL WORKER

## 2023-04-18 PROCEDURE — 90791 PSYCH DIAGNOSTIC EVALUATION: CPT | Mod: S$GLB,,, | Performed by: SOCIAL WORKER

## 2023-04-18 PROCEDURE — 1159F MED LIST DOCD IN RCRD: CPT | Mod: CPTII,S$GLB,, | Performed by: SOCIAL WORKER

## 2023-04-18 PROCEDURE — 99999 PR PBB SHADOW E&M-EST. PATIENT-LVL I: ICD-10-PCS | Mod: PBBFAC,,, | Performed by: SOCIAL WORKER

## 2023-04-18 PROCEDURE — 90791 PR PSYCHIATRIC DIAGNOSTIC EVALUATION: ICD-10-PCS | Mod: S$GLB,,, | Performed by: SOCIAL WORKER

## 2023-04-18 NOTE — PROGRESS NOTES
Psychiatry Initial Visit (PhD/LCSW)  Diagnostic Interview - CPT 45310    Date: 4/18/2023    Site: [facility]    Referral source:   Gina Murphy DNP    Clinical status of patient: Outpatient    Stormy Carlisle, a 43 y.o. female, for initial evaluation visit.  Met with patient.    Chief complaint/reason for encounter: attention deficit, depression, anxiety, behavior, and interpersonal    History of present illness: This is Stormy Carlisle a [id number] who is  over 15 yrs ago.  Pt reports that she is here today to establish care with a therapist and would like to initiate individual therapy as it has been several years since she has been in any type of psychotherapy. Pt reports she is struggling with remaining in her marriage. Pt contemplates if she is going to be able to remain in her marriage until her youngest graduates from [employer] which will be in 8 years. 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 Pt also reports her realization that she is able to do her art and does not have the pressure of having to support herself and children on her art/income. Pt states her  is a great father and is very engaging with her children.       Anxiety  Patient is here for evaluation of anxiety.  She has the following anxiety symptoms: difficulty concentrating, insomnia, irritable, psychomotor agitation- but symptoms have improved since starting prozac. Onset of symptoms was approximately 10 years ago.  Symptoms have been gradually worsening since that time. She denies current suicidal and homicidal ideation. Family history significant for anxiety.Possible organic causes contributing are: none. Risk factors: positive family history in  brother(s) and mother Previous treatment includes medication Paxil and Prozac.   She complains of the following medication side effects: none.    "  Depression  Patient complains of depression. She complains of anhedonia, depressed mood, difficulty concentrating and fatigue. Onset was approximately 10 years ago. Symptoms have been controlled since that time. Current symptoms include: well controlled on current medication. Patient denies anhedonia and depressed mood. Family history significant for anxiety and depression. Possible organic causes contributing are: none. Risk factors: positive family history in  mother. Previous treatment includes medication. She complains of the following side effects from the treatment: none.     ADHD  Difficulty sustaining attention in tasks or fun activities-easily distracted easily.   Difficulty following through on instructions and fail to finish work.  Difficulty organizing tasks and activities.  Avoids, dislikes, or are reluctant to engage in work thar requires sustained mental effort  yes - very difficult to participate in conversations  Easily distracted  Forgetful in daily activities  Fidget with hands or feet, or squirm in seat - "always have a lot of nervous energy"  Difficulty engaging in leisure activities or doing fun things quietly- improving    Pain: noncontributory    Symptoms:   Mood: depressed mood, diminished interest, psychomotor agitation, and poor concentration  Anxiety: excessive anxiety/worry, restlessness/keyed up, and irritability  Substance abuse: denied  Cognitive functioning: denied  Health behaviors: noncontributory    Psychiatric history: has participated in counseling/psychotherapy on an outpatient basis in the past and currently under psychiatric care    Medical history: noncontributory    Family history of psychiatric illness:  Mother & brother treated for anxiety & depression.     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   Past psych:  Has seen several residents here. Has been through CBT but not " "recently. Med trials as above plus ambien per chart in 2015    Substance use:   Alcohol: infrequent   Drugs: none   Tobacco: none   Caffeine: 1-cup of coffee in the AM.     Current medications and drug reactions (include OTC, herbal): see medication list     Strengths and liabilities: Strength: Patient accepts guidance/feedback, Strength: Patient is expressive/articulate., Strength: Patient is intelligent., Strength: Patient is motivated for change., Strength: Patient is physically healthy., Strength: Patient has positive support network., Strength: Patient has reasonable judgment., Strength: Patient is stable., Liability: Patient lacks coping skills.    Current Evaluation:     Mental Status Exam:  General Appearance:  unremarkable, age appropriate   Speech: normal tone, normal rate, normal pitch, normal volume      Level of Cooperation: cooperative      Thought Processes: normal and logical, circumstantial   Mood: anxious, dysthymic      Thought Content: normal, no suicidality, no homicidality, delusions, or paranoia   Affect: congruent and appropriate   Orientation: Oriented x3   Memory: recent >  intact, remote >  intact   Attention Span & Concentration: spelled "WORLD" forwards and backwards   Fund of General Knowledge: intact and appropriate to age and level of education   Abstract Reasoning: interpretation of similarities was abstract   Judgment & Insight: good     Language  intact     General Impression: 42 y/o F with hx of depression, anxiety and ADHD who presents for follow up. Pt is on  20 mg prozac and has .05 xanax as needed up to 3x's per day.  Would like to resume individual therapy to work on better coping skills and to explore insight as to her unhappiness in her marriage and questions sexuality describes feeling more bisexual.       Diagnostic Impression - Plan:       ICD-10-CM ICD-9-CM   1. JAMEY (generalized anxiety disorder)  F41.1 300.02   2. Moderate episode of recurrent major depressive disorder "  F33.1 296.32   3. Anxiety  F41.9 300.00   4. Attention deficit hyperactivity disorder (ADHD), predominantly inattentive type  F90.0 314.00       Plan:individual psychotherapy and medication management by physician    Return to Clinic: 1 month    Length of Service (minutes): 60

## 2023-04-19 ENCOUNTER — PATIENT MESSAGE (OUTPATIENT)
Dept: PSYCHIATRY | Facility: CLINIC | Age: 43
End: 2023-04-19
Payer: COMMERCIAL

## 2023-04-26 ENCOUNTER — PATIENT MESSAGE (OUTPATIENT)
Dept: PSYCHIATRY | Facility: CLINIC | Age: 43
End: 2023-04-26
Payer: COMMERCIAL

## 2023-04-26 DIAGNOSIS — F90.0 ATTENTION DEFICIT HYPERACTIVITY DISORDER (ADHD), PREDOMINANTLY INATTENTIVE TYPE: ICD-10-CM

## 2023-04-26 RX ORDER — DEXTROAMPHETAMINE SACCHARATE, AMPHETAMINE ASPARTATE, DEXTROAMPHETAMINE SULFATE AND AMPHETAMINE SULFATE 3.75; 3.75; 3.75; 3.75 MG/1; MG/1; MG/1; MG/1
15 TABLET ORAL DAILY
Qty: 30 TABLET | Refills: 0 | Status: SHIPPED | OUTPATIENT
Start: 2023-04-26 | End: 2023-04-27 | Stop reason: SDUPTHER

## 2023-04-27 RX ORDER — DEXTROAMPHETAMINE SACCHARATE, AMPHETAMINE ASPARTATE, DEXTROAMPHETAMINE SULFATE AND AMPHETAMINE SULFATE 3.75; 3.75; 3.75; 3.75 MG/1; MG/1; MG/1; MG/1
15 TABLET ORAL DAILY
Qty: 60 TABLET | Refills: 0 | Status: SHIPPED | OUTPATIENT
Start: 2023-04-27 | End: 2023-05-04 | Stop reason: SDUPTHER

## 2023-05-03 ENCOUNTER — PATIENT MESSAGE (OUTPATIENT)
Dept: PSYCHIATRY | Facility: CLINIC | Age: 43
End: 2023-05-03
Payer: COMMERCIAL

## 2023-05-03 DIAGNOSIS — F90.0 ATTENTION DEFICIT HYPERACTIVITY DISORDER (ADHD), PREDOMINANTLY INATTENTIVE TYPE: ICD-10-CM

## 2023-05-04 ENCOUNTER — PATIENT MESSAGE (OUTPATIENT)
Dept: PSYCHIATRY | Facility: CLINIC | Age: 43
End: 2023-05-04
Payer: COMMERCIAL

## 2023-05-04 DIAGNOSIS — F90.0 ATTENTION DEFICIT HYPERACTIVITY DISORDER (ADHD), PREDOMINANTLY INATTENTIVE TYPE: ICD-10-CM

## 2023-05-04 RX ORDER — DEXTROAMPHETAMINE SACCHARATE, AMPHETAMINE ASPARTATE, DEXTROAMPHETAMINE SULFATE AND AMPHETAMINE SULFATE 3.75; 3.75; 3.75; 3.75 MG/1; MG/1; MG/1; MG/1
15 TABLET ORAL 2 TIMES DAILY
Qty: 60 TABLET | Refills: 0 | Status: SHIPPED | OUTPATIENT
Start: 2023-05-04 | End: 2023-06-12 | Stop reason: SDUPTHER

## 2023-05-08 ENCOUNTER — TELEPHONE (OUTPATIENT)
Dept: PSYCHIATRY | Facility: CLINIC | Age: 43
End: 2023-05-08
Payer: COMMERCIAL

## 2023-05-08 DIAGNOSIS — Z12.31 VISIT FOR SCREENING MAMMOGRAM: Primary | ICD-10-CM

## 2023-05-08 DIAGNOSIS — Z30.011 ENCOUNTER FOR INITIAL PRESCRIPTION OF CONTRACEPTIVE PILLS: ICD-10-CM

## 2023-05-08 RX ORDER — NORGESTIMATE AND ETHINYL ESTRADIOL 7DAYSX3 28
1 KIT ORAL DAILY
Qty: 28 TABLET | Refills: 1 | Status: CANCELLED | OUTPATIENT
Start: 2023-05-08 | End: 2024-05-07

## 2023-05-08 NOTE — TELEPHONE ENCOUNTER
----- Message from Dee Reeves, PhD sent at 4/25/2023  8:33 AM CDT -----  Regarding: Pt needs monthly appts  Pls contact pt and offer a monthly appt.  Thanks, SL

## 2023-05-09 RX ORDER — NORGESTIMATE AND ETHINYL ESTRADIOL 7DAYSX3 28
1 KIT ORAL DAILY
Qty: 28 TABLET | Refills: 1 | Status: SHIPPED | OUTPATIENT
Start: 2023-05-09 | End: 2023-06-01

## 2023-05-13 RX ORDER — FLUOXETINE HYDROCHLORIDE 20 MG/1
CAPSULE ORAL
Qty: 90 CAPSULE | Refills: 1 | Status: SHIPPED | OUTPATIENT
Start: 2023-05-13 | End: 2023-10-05 | Stop reason: SDUPTHER

## 2023-05-17 ENCOUNTER — OFFICE VISIT (OUTPATIENT)
Dept: PSYCHIATRY | Facility: CLINIC | Age: 43
End: 2023-05-17
Payer: COMMERCIAL

## 2023-05-17 DIAGNOSIS — F90.0 ATTENTION DEFICIT HYPERACTIVITY DISORDER (ADHD), PREDOMINANTLY INATTENTIVE TYPE: Chronic | ICD-10-CM

## 2023-05-17 DIAGNOSIS — F33.1 MODERATE EPISODE OF RECURRENT MAJOR DEPRESSIVE DISORDER: Primary | ICD-10-CM

## 2023-05-17 DIAGNOSIS — F41.1 GAD (GENERALIZED ANXIETY DISORDER): ICD-10-CM

## 2023-05-17 PROCEDURE — 90834 PR PSYCHOTHERAPY W/PATIENT, 45 MIN: ICD-10-PCS | Mod: S$GLB,,, | Performed by: SOCIAL WORKER

## 2023-05-17 PROCEDURE — 99999 PR PBB SHADOW E&M-EST. PATIENT-LVL I: CPT | Mod: PBBFAC,,, | Performed by: SOCIAL WORKER

## 2023-05-17 PROCEDURE — 99999 PR PBB SHADOW E&M-EST. PATIENT-LVL I: ICD-10-PCS | Mod: PBBFAC,,, | Performed by: SOCIAL WORKER

## 2023-05-17 PROCEDURE — 1159F MED LIST DOCD IN RCRD: CPT | Mod: CPTII,S$GLB,, | Performed by: SOCIAL WORKER

## 2023-05-17 PROCEDURE — 1159F PR MEDICATION LIST DOCUMENTED IN MEDICAL RECORD: ICD-10-PCS | Mod: CPTII,S$GLB,, | Performed by: SOCIAL WORKER

## 2023-05-17 PROCEDURE — 90834 PSYTX W PT 45 MINUTES: CPT | Mod: S$GLB,,, | Performed by: SOCIAL WORKER
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
## 2023-06-01 DIAGNOSIS — Z30.011 ENCOUNTER FOR INITIAL PRESCRIPTION OF CONTRACEPTIVE PILLS: ICD-10-CM

## 2023-06-01 RX ORDER — NORGESTIMATE AND ETHINYL ESTRADIOL 7DAYSX3 28
KIT ORAL
Qty: 28 TABLET | Refills: 1 | Status: SHIPPED | OUTPATIENT
Start: 2023-06-01 | End: 2023-07-03

## 2023-06-12 ENCOUNTER — OFFICE VISIT (OUTPATIENT)
Dept: PSYCHIATRY | Facility: CLINIC | Age: 43
End: 2023-06-12
Payer: COMMERCIAL

## 2023-06-12 DIAGNOSIS — F90.0 ATTENTION DEFICIT HYPERACTIVITY DISORDER (ADHD), PREDOMINANTLY INATTENTIVE TYPE: Primary | ICD-10-CM

## 2023-06-12 DIAGNOSIS — F33.42 RECURRENT MAJOR DEPRESSIVE DISORDER, IN FULL REMISSION: ICD-10-CM

## 2023-06-12 DIAGNOSIS — F41.1 GAD (GENERALIZED ANXIETY DISORDER): ICD-10-CM

## 2023-06-12 PROCEDURE — 99214 OFFICE O/P EST MOD 30 MIN: CPT | Mod: 95,,, | Performed by: NURSE PRACTITIONER

## 2023-06-12 PROCEDURE — 99214 PR OFFICE/OUTPT VISIT, EST, LEVL IV, 30-39 MIN: ICD-10-PCS | Mod: 95,,, | Performed by: NURSE PRACTITIONER

## 2023-06-12 RX ORDER — DEXTROAMPHETAMINE SACCHARATE, AMPHETAMINE ASPARTATE, DEXTROAMPHETAMINE SULFATE AND AMPHETAMINE SULFATE 3.75; 3.75; 3.75; 3.75 MG/1; MG/1; MG/1; MG/1
15 TABLET ORAL 2 TIMES DAILY
Qty: 60 TABLET | Refills: 0 | Status: SHIPPED | OUTPATIENT
Start: 2023-06-12 | End: 2023-07-31 | Stop reason: SDUPTHER

## 2023-06-12 NOTE — PROGRESS NOTES
Outpatient Psychiatry Follow-Up Visit (MD/NP)    6/12/2023      The patient location is: [place], [place]  The chief complaint leading to consultation is: inattention, depression and anxiety    Visit type: audiovisual  Thank you for your consult to [facility]. We have reviewed the patient chart. This patient     Face to Face time with patient:  10 minutes  15 minutes of total time spent on the encounter, which includes face to face time and non-face to face time preparing to see the patient (eg, review of tests), Obtaining and/or reviewing separately obtained history, Documenting clinical information in the electronic or other health record, Independently interpreting results (not separately reported) and communicating results to the patient/family/caregiver, or Care coordination (not separately reported).         Each patient to whom he or she provides medical services by telemedicine is:  (1) informed of the relationship between the physician and patient and the respective role of any other health care provider with respect to management of the patient; and (2) notified that he or she may decline to receive medical services by telemedicine and may withdraw from such care at any time.    Notes:       Clinical Status of Patient:  Outpatient (Ambulatory)    Chief Complaint:  Stormy Carlisle is a 43 y.o. female who presents today for follow-up of depression, anxiety and attention problems. She is taking  20 mg Prozac & adderall 10 mg BID for ADHD.    Current Psychiatric Medications:  Prozac 20 mg daily for mood  Adderall 10/15 mg PO BID  Xanax 0.25 mg po RPN (rarely takes for severe anxiety)    Interval History and Content of Current Session:    Patient stated that she has been doing well since her last visit. She stated her and her family are doing well. She continues to report manageable anxiety with prozac 20 mg po daily and her effective coping strategies. She denied feeling depressed and when she  "feels stressed she continues to utilize healthy coping skills like swimming, gardening, and playing with her kids. She described her mood as "good, fine, and steady" and her affect was appropriate and mood congruent. She continues to report adderall 10-15 mg po BID is still very helpful in increasing her focus, attention and productivity. She continues to report good sleep and appetite. She continues to report managed depression and anxiety with Prozac 20 mg po daily and coping strategies. She denied SE and AE including jitteriness, CP, or palpitations. She denied SI/HI/AVH. She denied hopelessness, amotvation, or anhedonia. No objective signs or symptoms of jonny or psychosis. Patient VS are within normal range and no history of hypertension. She is seeing Dee, Ph.D. and finds it helpful.    Past med trials: Wellbutrin (helped mood/motivation, but caused her to pass out, have decrease appetite, weight loss), Paxil was very hard to come off of and does not remember how much it helped. Prozac worked but was insufficiently helpful, venlafaxine (brain zaps and interfered with sleep) and vistaril (too groggy, sedating and made her feel tired that caused her to feel anxious and depressed)    Psychiatric Review Of Systems - Is patient experiencing or having changes in:  sleep: no  appetite: no  weight: no  energy/anergy: no  interest/pleasure/anhedonia: no  somatic symptoms: no  anxiety/panic: yes but manageable  guilty/hopelessness: no  concentration: no. Improved with adderall  S.I.B.s/risky behavior: no  Irritability: no  Racing thoughts: no  Impulsive behaviors: no  Paranoia:no  AVH:no  Suicidal thoughts/plan/intent: no  SE: no  ETOH: no  Drugs: no    Review of Systems     MEDICAL REVIEW OF SYSTEMS  History obtained from the patient  General : NO chills or fever  Eyes: NO  visual changes  ENT: NO  nasal discharge or sore throat  Endocrine: NO weight changes  Dermatological: NO rashes  Respiratory: NO cough, " "shortness of breath  Cardiovascular: NO chest pain, palpitations or racing heart  Gastrointestinal: NO nausea, vomiting, constipation or diarrhea  Musculoskeletal: NO muscle pain or stiffness  Neurological: NO , dizziness, headaches or tremors  Psychiatric: please see HPI        Past Medical, Family and Social History: The patient's past medical, family and social history have been reviewed and updated as appropriate within the electronic medical record - see encounter notes.  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 Has been through CBT but not recently. Med trials as above plus ambien per chart in 2015    Compliance: yes    Side effects: None    Risk Parameters:  Patient reports no suicidal ideation  Patient reports no homicidal ideation  Patient reports no self-injurious behavior  Patient reports no violent behavior    Exam (detailed: at least 9 elements; comprehensive: all 15 elements)   Constitutional  Vitals:  Most recent vital signs, dated greater than 90 days prior to this appointment, were reviewed.   There were no vitals filed for this visit.     General:  unremarkable, age appropriate     Musculoskeletal  Muscle Strength/Tone:  no dystonia, no tremor, no tic   Gait & Station:  Normal gait     Psychiatric  Speech:  no latency; no press, spontaneous,    Mood & Affect:  "Good, fine, and steady"  Mood congruent and appropriate   Thought Process:  normal and logical, logical   Associations:  intact   Thought Content:  normal, no suicidality, no homicidality, delusions, or paranoia   Insight:  intact, has awareness of illness   Judgement: behavior is adequate to circumstances, age appropriate   Orientation:  grossly intact   Memory: intact for content of interview, able to remember recent events- yes, able to remember remote events- yes   Language: grossly intact, able to name, able to repeat   Attention Span & Concentration:  able to " focus with adderall   Fund of Knowledge:  intact and appropriate to age and level of education     Assessment and Diagnosis   Status/Progress: Based on the examination today, the patient's problem(s) is/are not well controlled.  New problems have not been presented today.   Co-morbidities, Diagnostic uncertainty and Lack of compliance are not complicating management of the primary condition.  There are no active rule-out diagnoses for this patient at this time.     General Impression: Doing well on her current medication regimen and no changes in dosages due to their effectiveness.       ICD-10-CM ICD-9-CM   1. Attention deficit hyperactivity disorder (ADHD), predominantly inattentive type  F90.0 314.00   2. JAMEY (generalized anxiety disorder)  F41.1 300.02   3. Recurrent major depressive disorder, in full remission  F33.42 296.36              Intervention/Counseling/Treatment Plan     Depression & Anxiety  Continue prozac 20 mg to help with depressive sx  Continue xanax 0.25 mg po PRN daily for severe anxiety and panic attacks (rarely takes it)  Informed pt of the risks of continuous Benzodiazepine use including tolerance, dependence, dizziness, drowsiness, memory problems, and withdrawals that may be life threatening upon abrupt cessation. Also advised not to take Benzodiazepines with Opiates or other sedatives and also not to drive or operate heavy machinery while using Benzodiazepines. Avoid alcohol with Benzodiazapine. Patient agreed to take it and verbalized understanding    Reviewed patient's most recent vitals     ADHD, inattentive type  Continue Adderall IR 10 mg PO BID. Checked  and no signs of misuse. Patent filled adderall 15 mg po daily due to stimulant shortage       -Recommended psychotherapy     Previously discussed risks, benefits, AE's (incl serotonin syndrome) SE's (including but not limited to GI sx, insomnia, agitation/anxiety/activation, appetite changes, HA, hypotension, others) inherent  unpredictability of illness and treatment option    Discussed informed consent, diagnosis, risks and benefits of proposed treatment above vs alternative treatments vs no treatment, and potential side effects of these treatments. The patient expresses understanding of the above and displays the capacity to agree with this treatment given said understanding. Patient also agrees that, currently, the benefits outweigh the risks and would like to pursue treatment at this time. Answered all questions and discussed follow up. Encouraged patient to contact us with any questions or concerns.    Encouraged Patient to keep future appointment    Take medications as prescribed     Patient to present to Emergency Department for thoughts to harm herself or others      Return to Clinic: 3 months or sooner if needed    Gina Murphy, JOSE MANUELP-BC

## 2023-06-26 ENCOUNTER — PATIENT MESSAGE (OUTPATIENT)
Dept: PSYCHIATRY | Facility: CLINIC | Age: 43
End: 2023-06-26
Payer: COMMERCIAL

## 2023-06-26 ENCOUNTER — OFFICE VISIT (OUTPATIENT)
Dept: PSYCHIATRY | Facility: CLINIC | Age: 43
End: 2023-06-26
Payer: COMMERCIAL

## 2023-06-26 DIAGNOSIS — F90.0 ATTENTION DEFICIT HYPERACTIVITY DISORDER (ADHD), PREDOMINANTLY INATTENTIVE TYPE: Chronic | ICD-10-CM

## 2023-06-26 DIAGNOSIS — F41.1 GAD (GENERALIZED ANXIETY DISORDER): ICD-10-CM

## 2023-06-26 DIAGNOSIS — F33.1 MODERATE EPISODE OF RECURRENT MAJOR DEPRESSIVE DISORDER: Primary | ICD-10-CM

## 2023-06-26 PROCEDURE — 90834 PR PSYCHOTHERAPY W/PATIENT, 45 MIN: ICD-10-PCS | Mod: 95,,, | Performed by: SOCIAL WORKER

## 2023-06-26 PROCEDURE — 1159F PR MEDICATION LIST DOCUMENTED IN MEDICAL RECORD: ICD-10-PCS | Mod: CPTII,95,, | Performed by: SOCIAL WORKER

## 2023-06-26 PROCEDURE — 1159F MED LIST DOCD IN RCRD: CPT | Mod: CPTII,95,, | Performed by: SOCIAL WORKER

## 2023-06-26 PROCEDURE — 90834 PSYTX W PT 45 MINUTES: CPT | Mod: 95,,, | Performed by: SOCIAL WORKER

## 2023-07-02 DIAGNOSIS — Z30.011 ENCOUNTER FOR INITIAL PRESCRIPTION OF CONTRACEPTIVE PILLS: ICD-10-CM

## 2023-07-03 RX ORDER — NORGESTIMATE AND ETHINYL ESTRADIOL 7DAYSX3 LO
KIT ORAL
Qty: 28 TABLET | Refills: 1 | Status: SHIPPED | OUTPATIENT
Start: 2023-07-03 | End: 2023-07-20

## 2023-07-17 DIAGNOSIS — Z30.011 ENCOUNTER FOR INITIAL PRESCRIPTION OF CONTRACEPTIVE PILLS: ICD-10-CM

## 2023-07-17 NOTE — TELEPHONE ENCOUNTER
Refill Routing Note   Medication(s) are not appropriate for processing by [facility] for the following reason(s):      Patient not seen by provider within 15 months: future office visit 8/22/23  Mammography outdated    [facility] action(s):  Defer Care Due:  None identified          Appointments  past 12m or future 3m with PCP    Date Provider   Last Visit   8/19/2021 Araseli Toledo, DO   Next Visit   8/22/2023 Araseli Toledo, DO   ED visits in past 90 days: 0        Note composed:5:24 PM 07/17/2023

## 2023-07-20 RX ORDER — NORGESTIMATE AND ETHINYL ESTRADIOL 7DAYSX3 LO
KIT ORAL
Qty: 84 TABLET | Refills: 0 | Status: SHIPPED | OUTPATIENT
Start: 2023-07-20 | End: 2023-10-23 | Stop reason: SDUPTHER

## 2023-07-24 ENCOUNTER — OFFICE VISIT (OUTPATIENT)
Dept: PSYCHIATRY | Facility: CLINIC | Age: 43
End: 2023-07-24
Payer: COMMERCIAL

## 2023-07-24 DIAGNOSIS — F90.0 ATTENTION DEFICIT HYPERACTIVITY DISORDER (ADHD), PREDOMINANTLY INATTENTIVE TYPE: Chronic | ICD-10-CM

## 2023-07-24 DIAGNOSIS — F41.9 ANXIETY: Primary | Chronic | ICD-10-CM

## 2023-07-24 DIAGNOSIS — F33.1 MODERATE EPISODE OF RECURRENT MAJOR DEPRESSIVE DISORDER: Chronic | ICD-10-CM

## 2023-07-24 DIAGNOSIS — F41.1 GAD (GENERALIZED ANXIETY DISORDER): ICD-10-CM

## 2023-07-24 PROCEDURE — 90834 PR PSYCHOTHERAPY W/PATIENT, 45 MIN: ICD-10-PCS | Mod: 95,,, | Performed by: SOCIAL WORKER

## 2023-07-24 PROCEDURE — 90834 PSYTX W PT 45 MINUTES: CPT | Mod: 95,,, | Performed by: SOCIAL WORKER

## 2023-07-26 NOTE — PROGRESS NOTES
Individual Psychotherapy (PhD/LCSW)    7/24/2023    Site:  [facility]         Therapeutic Intervention: Met with patient.  Outpatient - Insight oriented psychotherapy 45 min - CPT code 85253, Outpatient - Behavior modifying psychotherapy 45 min - CPT code 61742, Outpatient - Supportive psychotherapy 45 min - CPT Code 81759, and Outpatient - Interactive psychotherapy 45 min - CPT code 64905    Chief complaint/reason for encounter: attention deficit, depression, anxiety, and behavior     Interval history and content of current session: Pt presents for follow up session. Pt was last seen 1-month ago.  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 Pt reports when she went to visit her family she decided to not have her  go on the trip and this did help and she was able to have a little better time (although pt discusses the challenges with her fly dynamics with her own fly of origin).  Pt continues to contemplate the pro's & con's of remaining her marriage. Pt continues to explore ways to reduce conflict and have more of strategy to handling the daily activities that generally lead to conflict with her .  Pt reports she also continues to contemplate her marriage and if she can remain in it for her family. Pt reports no other concerns. Pt is now focusing on getting her kids ready to return back to school in a few weeks.     Treatment plan:  Target symptoms: distractability, recurrent depression, anxiety , marital dynamics.   Why chosen therapy is appropriate versus another modality: relevant to diagnosis, patient responds to this modality, evidence based practice  Outcome monitoring methods: self-report, observation  Therapeutic intervention type:  insight oriented psychotherapy, behavior modifying psychotherapy, supportive psychotherapy, interactive psychotherapy    Risk parameters:  Patient reports no suicidal ideation  Patient reports no homicidal ideation  Patient reports no self-injurious behavior  Patient reports no violent behavior    Verbal deficits: None    Patient's response to intervention:  The patient's response to intervention is accepting, motivated.    Progress toward goals and other mental status changes:  The patient's progress toward goals is excellent.    Diagnosis:     ICD-10-CM ICD-9-CM   1. Anxiety  F41.9 300.00   2. Attention deficit hyperactivity disorder (ADHD), predominantly inattentive type  F90.0 314.00   3. Moderate episode of recurrent major depressive disorder  F33.1 296.32   4. JAMEY (generalized anxiety disorder)  F41.1 300.02       Plan:  individual psychotherapy    Return to clinic: 1 month    Length of Service (minutes): 45

## 2023-07-29 ENCOUNTER — PATIENT MESSAGE (OUTPATIENT)
Dept: PSYCHIATRY | Facility: CLINIC | Age: 43
End: 2023-07-29
Payer: COMMERCIAL

## 2023-07-29 DIAGNOSIS — F90.0 ATTENTION DEFICIT HYPERACTIVITY DISORDER (ADHD), PREDOMINANTLY INATTENTIVE TYPE: ICD-10-CM

## 2023-07-31 RX ORDER — DEXTROAMPHETAMINE SACCHARATE, AMPHETAMINE ASPARTATE, DEXTROAMPHETAMINE SULFATE AND AMPHETAMINE SULFATE 3.75; 3.75; 3.75; 3.75 MG/1; MG/1; MG/1; MG/1
15 TABLET ORAL 2 TIMES DAILY
Qty: 60 TABLET | Refills: 0 | Status: SHIPPED | OUTPATIENT
Start: 2023-07-31 | End: 2023-09-15 | Stop reason: SDUPTHER

## 2023-08-01 NOTE — PROGRESS NOTES
Individual Psychotherapy (PhD/LCSW)    6/26/2023    Site:  [facility]         Therapeutic Intervention: Met with patient.  Outpatient - Insight oriented psychotherapy 45 min - CPT code 01421, Outpatient - Behavior modifying psychotherapy 45 min - CPT code 18921, Outpatient - Supportive psychotherapy 45 min - CPT Code 15913, and Outpatient - Interactive psychotherapy 45 min - CPT code 57778    Chief complaint/reason for encounter: attention deficit, depression, anxiety, and behavior     Interval history and content of current session: Pt presents for follow up session. Pt was last seen 1-month ago for session.  Pt reports that she is now in the summer schedule with her kids. Pt discussed the recent trips they have coming up between her parents and visit her in-laws in [place] for 2-weeks.  Pt continues to  report she has been very busy with 2 separate shows one at the [employer] and the other at a Gallery in the [facility] off [address]. Pt discussed her worries over safety and the state of things in the [facility]. Pt continues to report is worried about safety and having her kids grow up in a tense environment were they a limited to explore and don't feel safe. Pt reports she also continues to contemplate her marriage and if she can remain in it for her family. Pt reports no other concerns. Pt states she has her summer planned for the kids with a few day camps- 2 famiily trips- then her children will be going to [place] to spend 2 weeks with her parents.     Treatment plan:  Target symptoms: distractability, recurrent depression, anxiety , marital dynamics.   Why chosen therapy is appropriate versus another modality: relevant to diagnosis, patient responds to this modality, evidence based practice  Outcome monitoring methods: self-report, observation  Therapeutic intervention type: insight oriented psychotherapy, behavior modifying psychotherapy, supportive psychotherapy, interactive psychotherapy    Risk  parameters:  Patient reports no suicidal ideation  Patient reports no homicidal ideation  Patient reports no self-injurious behavior  Patient reports no violent behavior    Verbal deficits: None    Patient's response to intervention:  The patient's response to intervention is accepting, motivated.    Progress toward goals and other mental status changes:  The patient's progress toward goals is excellent.    Diagnosis:     ICD-10-CM ICD-9-CM   1. Moderate episode of recurrent major depressive disorder  F33.1 296.32   2. JAMEY (generalized anxiety disorder)  F41.1 300.02   3. Attention deficit hyperactivity disorder (ADHD), predominantly inattentive type  F90.0 314.00       Plan:  individual psychotherapy    Return to clinic: 1 month    Length of Service (minutes): 45

## 2023-08-08 ENCOUNTER — PATIENT MESSAGE (OUTPATIENT)
Dept: OBSTETRICS AND GYNECOLOGY | Facility: CLINIC | Age: 43
End: 2023-08-08
Payer: COMMERCIAL

## 2023-08-30 ENCOUNTER — PATIENT MESSAGE (OUTPATIENT)
Dept: PSYCHIATRY | Facility: CLINIC | Age: 43
End: 2023-08-30
Payer: COMMERCIAL

## 2023-08-30 RX ORDER — ALPRAZOLAM 0.25 MG/1
0.25 TABLET ORAL DAILY PRN
Qty: 15 TABLET | Refills: 0 | Status: SHIPPED | OUTPATIENT
Start: 2023-08-30 | End: 2023-09-14

## 2023-09-11 ENCOUNTER — PATIENT MESSAGE (OUTPATIENT)
Dept: PSYCHIATRY | Facility: CLINIC | Age: 43
End: 2023-09-11
Payer: COMMERCIAL

## 2023-09-15 ENCOUNTER — PATIENT MESSAGE (OUTPATIENT)
Dept: PSYCHIATRY | Facility: CLINIC | Age: 43
End: 2023-09-15
Payer: COMMERCIAL

## 2023-09-15 DIAGNOSIS — F90.0 ATTENTION DEFICIT HYPERACTIVITY DISORDER (ADHD), PREDOMINANTLY INATTENTIVE TYPE: ICD-10-CM

## 2023-09-15 RX ORDER — DEXTROAMPHETAMINE SACCHARATE, AMPHETAMINE ASPARTATE, DEXTROAMPHETAMINE SULFATE AND AMPHETAMINE SULFATE 3.75; 3.75; 3.75; 3.75 MG/1; MG/1; MG/1; MG/1
15 TABLET ORAL 2 TIMES DAILY
Qty: 60 TABLET | Refills: 0 | Status: SHIPPED | OUTPATIENT
Start: 2023-09-15 | End: 2023-10-05 | Stop reason: SDUPTHER

## 2023-09-19 ENCOUNTER — OFFICE VISIT (OUTPATIENT)
Dept: PSYCHIATRY | Facility: CLINIC | Age: 43
End: 2023-09-19
Payer: COMMERCIAL

## 2023-09-19 ENCOUNTER — PATIENT MESSAGE (OUTPATIENT)
Dept: PSYCHIATRY | Facility: CLINIC | Age: 43
End: 2023-09-19
Payer: COMMERCIAL

## 2023-09-19 DIAGNOSIS — F41.1 GAD (GENERALIZED ANXIETY DISORDER): ICD-10-CM

## 2023-09-19 DIAGNOSIS — F90.0 ATTENTION DEFICIT HYPERACTIVITY DISORDER (ADHD), PREDOMINANTLY INATTENTIVE TYPE: Chronic | ICD-10-CM

## 2023-09-19 DIAGNOSIS — F33.1 MODERATE EPISODE OF RECURRENT MAJOR DEPRESSIVE DISORDER: Primary | Chronic | ICD-10-CM

## 2023-09-19 PROCEDURE — 1159F PR MEDICATION LIST DOCUMENTED IN MEDICAL RECORD: ICD-10-PCS | Mod: CPTII,95,, | Performed by: SOCIAL WORKER

## 2023-09-19 PROCEDURE — 1159F MED LIST DOCD IN RCRD: CPT | Mod: CPTII,95,, | Performed by: SOCIAL WORKER

## 2023-09-19 PROCEDURE — 90837 PR PSYCHOTHERAPY W/PATIENT, 60 MIN: ICD-10-PCS | Mod: 95,,, | Performed by: SOCIAL WORKER

## 2023-09-19 PROCEDURE — 90837 PSYTX W PT 60 MINUTES: CPT | Mod: 95,,, | Performed by: SOCIAL WORKER

## 2023-09-19 NOTE — PROGRESS NOTES
Individual Psychotherapy (PhD/LCSW)    9/19/2023    Site:  [facility]         Therapeutic Intervention: Met with patient.  Outpatient - Insight oriented psychotherapy 45 min - CPT code 90083, Outpatient - Behavior modifying psychotherapy 45 min - CPT code 30580, Outpatient - Supportive psychotherapy 45 min - CPT Code 53705, and Outpatient - Interactive psychotherapy 45 min - CPT code 96132    Chief complaint/reason for encounter: attention deficit, depression, anxiety, and behavior     Interval history and content of current session: Pt presents for follow up session. Pt was last seen 1-month ago.  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 Pt continues to contemplate the pro's & con's of remaining her marriage. Pt continues to explore ways to reduce conflict and have more of strategy to handling the daily activities that generally lead to conflict with her .  Pt reports she also continues to contemplate her marriage and if she can remain in it for her family. Pt reports no other concerns. Pt is now focusing on getting her kids ready to return back to school in a few weeks.     Treatment plan:  Target symptoms: distractability, recurrent depression, anxiety , marital dynamics.   Why chosen therapy is appropriate versus another modality: relevant to diagnosis, patient responds to this modality, evidence based practice  Outcome monitoring methods: self-report, observation  Therapeutic intervention type: insight oriented psychotherapy, behavior modifying psychotherapy, supportive psychotherapy, interactive psychotherapy    Risk parameters:  Patient reports no suicidal ideation  Patient reports no homicidal ideation  Patient reports no  self-injurious behavior  Patient reports no violent behavior    Verbal deficits: None    Patient's response to intervention:  The patient's response to intervention is accepting, motivated.    Progress toward goals and other mental status changes:  The patient's progress toward goals is excellent.    Diagnosis:     ICD-10-CM ICD-9-CM   1. Moderate episode of recurrent major depressive disorder  F33.1 296.32   2. JAMEY (generalized anxiety disorder)  F41.1 300.02   3. Attention deficit hyperactivity disorder (ADHD), predominantly inattentive type  F90.0 314.00       Plan:  individual psychotherapy    Return to clinic: 1 month    Length of Service (minutes): 45

## 2023-10-05 ENCOUNTER — OFFICE VISIT (OUTPATIENT)
Dept: PSYCHIATRY | Facility: CLINIC | Age: 43
End: 2023-10-05
Payer: COMMERCIAL

## 2023-10-05 DIAGNOSIS — F33.42 RECURRENT MAJOR DEPRESSIVE DISORDER, IN FULL REMISSION: ICD-10-CM

## 2023-10-05 DIAGNOSIS — F41.1 GAD (GENERALIZED ANXIETY DISORDER): Primary | ICD-10-CM

## 2023-10-05 DIAGNOSIS — F90.0 ATTENTION DEFICIT HYPERACTIVITY DISORDER (ADHD), PREDOMINANTLY INATTENTIVE TYPE: ICD-10-CM

## 2023-10-05 PROCEDURE — 99214 PR OFFICE/OUTPT VISIT, EST, LEVL IV, 30-39 MIN: ICD-10-PCS | Mod: 95,,, | Performed by: NURSE PRACTITIONER

## 2023-10-05 PROCEDURE — 99214 OFFICE O/P EST MOD 30 MIN: CPT | Mod: 95,,, | Performed by: NURSE PRACTITIONER

## 2023-10-05 RX ORDER — DEXTROAMPHETAMINE SACCHARATE, AMPHETAMINE ASPARTATE, DEXTROAMPHETAMINE SULFATE AND AMPHETAMINE SULFATE 3.75; 3.75; 3.75; 3.75 MG/1; MG/1; MG/1; MG/1
15 TABLET ORAL 2 TIMES DAILY
Qty: 60 TABLET | Refills: 0 | Status: SHIPPED | OUTPATIENT
Start: 2023-10-05 | End: 2023-11-30 | Stop reason: SDUPTHER

## 2023-10-05 RX ORDER — FLUOXETINE HYDROCHLORIDE 20 MG/1
20 CAPSULE ORAL DAILY
Qty: 90 CAPSULE | Refills: 1 | Status: SHIPPED | OUTPATIENT
Start: 2023-10-05

## 2023-10-05 NOTE — PROGRESS NOTES
Outpatient Psychiatry Follow-Up Visit (MD/NP)    10/5/2023      The patient location is: [place], [place]  The chief complaint leading to consultation is: inattention, depression and anxiety    Visit type: audiovisual  Thank you for your consult to [facility]. We have reviewed the patient chart. This patient     Face to Face time with patient: 13 minutes  18 minutes of total time spent on the encounter, which includes face to face time and non-face to face time preparing to see the patient (eg, review of tests), Obtaining and/or reviewing separately obtained history, Documenting clinical information in the electronic or other health record, Independently interpreting results (not separately reported) and communicating results to the patient/family/caregiver, or Care coordination (not separately reported).         Each patient to whom he or she provides medical services by telemedicine is:  (1) informed of the relationship between the physician and patient and the respective role of any other health care provider with respect to management of the patient; and (2) notified that he or she may decline to receive medical services by telemedicine and may withdraw from such care at any time.    Notes:       Clinical Status of Patient:  Outpatient (Ambulatory)    Chief Complaint:  Stormy Carlisle is a 43 y.o. female who presents today for follow-up of depression, anxiety and attention problems. She is taking  20 mg Prozac & adderall 15 mg BID for ADHD.    Current Psychiatric Medications:  Prozac 20 mg daily for mood  Adderall 15 mg PO BID  Xanax 0.25 mg po RPN (rarely takes for severe anxiety)    Interval History and Content of Current Session:    Patient stated that she has been doing well since her last visit. She stated her and her family are doing well. She stated her parent's visit went well. She continues to report manageable anxiety with prozac 20 mg po daily and her effective coping strategies. She  "denied feeling depressed and when she feels stressed she continues to utilize healthy coping skills like swimming, gardening, and playing with her kids. She described her mood as "fine" and her affect was appropriate and mood congruent. She continues to report adderall 15 mg po BID is still very helpful in increasing her focus, attention and productivity. She continues to report good sleep and appetite. She continues to report managed depression and anxiety with Prozac 20 mg po daily and coping strategies. She denied SE and AE including jitteriness, CP, or palpitations. She stated she takes xanax occasionally and only when she feels severely anxious or panicky.    She denied SI/HI/AVH. She denied hopelessness, amotvation, or anhedonia. No objective signs or symptoms of jonny or psychosis. Patient VS are within normal range and no history of hypertension. She denied alcohol or drug abuse.     Past med trials: Wellbutrin (helped mood/motivation, but caused her to pass out, have decrease appetite, weight loss), Paxil was very hard to come off of and does not remember how much it helped. Prozac worked but was insufficiently helpful, venlafaxine (brain zaps and interfered with sleep) and vistaril (too groggy, sedating and made her feel tired that caused her to feel anxious and depressed)    Psychiatric Review Of Systems - Is patient experiencing or having changes in:  sleep: no  appetite: no  weight: no  energy/anergy: no  interest/pleasure/anhedonia: no  somatic symptoms: no  anxiety/panic: yes but manageable  guilty/hopelessness: no  concentration: no. Improved with adderall  S.I.B.s/risky behavior: no  Irritability: no  Racing thoughts: no  Impulsive behaviors: no  Paranoia:no  AVH:no  Suicidal thoughts/plan/intent: no  SE: no  ETOH: no  Drugs: no    Review of Systems     MEDICAL REVIEW OF SYSTEMS  History obtained from the patient  General : NO chills or fever  Eyes: NO  visual changes  ENT: NO  nasal discharge or " "sore throat  Endocrine: NO weight changes  Dermatological: NO rashes  Respiratory: NO cough, shortness of breath  Cardiovascular: NO chest pain, palpitations or racing heart  Gastrointestinal: NO nausea, vomiting, constipation or diarrhea  Musculoskeletal: NO muscle pain or stiffness  Neurological: NO , dizziness, headaches or tremors  Psychiatric: please see HPI        Past Medical, Family and Social History: The patient's past medical, family and social history have been reviewed and updated as appropriate within the electronic medical record - see encounter notes.  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 Has been through CBT but not recently. Med trials as above plus ambien per chart in 2015    Compliance: yes    Side effects: None    Risk Parameters:  Patient reports no suicidal ideation  Patient reports no homicidal ideation  Patient reports no self-injurious behavior  Patient reports no violent behavior    Exam (detailed: at least 9 elements; comprehensive: all 15 elements)   Constitutional  Vitals:  Most recent vital signs, dated greater than 90 days prior to this appointment, were reviewed.   There were no vitals filed for this visit.     General:  unremarkable, age appropriate     Musculoskeletal  Muscle Strength/Tone:  no dystonia, no tremor, no tic   Gait & Station:  Normal gait     Psychiatric  Speech:  no latency; no press, spontaneous,    Mood & Affect:  "fine"  Mood congruent and appropriate   Thought Process:  normal and logical, logical   Associations:  intact   Thought Content:  normal, no suicidality, no homicidality, delusions, or paranoia   Insight:  intact, has awareness of illness   Judgement: behavior is adequate to circumstances, age appropriate   Orientation:  grossly intact   Memory: intact for content of interview, able to remember recent events- yes, able to remember remote events- yes   Language: grossly intact, " able to name, able to repeat   Attention Span & Concentration:  able to focus with adderall   Fund of Knowledge:  intact and appropriate to age and level of education     Assessment and Diagnosis   Status/Progress: Based on the examination today, the patient's problem(s) is/are not well controlled.  New problems have not been presented today.   Co-morbidities, Diagnostic uncertainty and Lack of compliance are not complicating management of the primary condition.  There are no active rule-out diagnoses for this patient at this time.     General Impression: Doing well on her current medication regimen of adderall 15 mg po BID and prozac 20 mg po daily. No changes in dosages due to their effectiveness in treating her ADHD, depression, and anxiety.      ICD-10-CM ICD-9-CM   1. JAMEY (generalized anxiety disorder)  F41.1 300.02   2. Attention deficit hyperactivity disorder (ADHD), predominantly inattentive type  F90.0 314.00   3. Recurrent major depressive disorder, in full remission  F33.42 296.36              Intervention/Counseling/Treatment Plan     Depression & Anxiety  Continue prozac 20 mg to help with depressive sx  Continue xanax 0.25 mg po PRN daily for severe anxiety and panic attacks (rarely takes it)  Informed pt of the risks of continuous Benzodiazepine use including tolerance, dependence, dizziness, drowsiness, memory problems, and withdrawals that may be life threatening upon abrupt cessation. Also advised not to take Benzodiazepines with Opiates or other sedatives and also not to drive or operate heavy machinery while using Benzodiazepines. Avoid alcohol with Benzodiazapine. Patient agreed to take it and verbalized understanding    Reviewed patient's most recent vitals     ADHD, inattentive type  Continue Adderall IR 15 mg PO BID. Checked  and no signs of misuse.      -Continue psychotherapy     Previously discussed risks, benefits, AE's (incl serotonin syndrome) SE's (including but not limited to GI  sx, insomnia, agitation/anxiety/activation, appetite changes, HA, hypotension, others) inherent unpredictability of illness and treatment option    Discussed informed consent, diagnosis, risks and benefits of proposed treatment above vs alternative treatments vs no treatment, and potential side effects of these treatments. The patient expresses understanding of the above and displays the capacity to agree with this treatment given said understanding. Patient also agrees that, currently, the benefits outweigh the risks and would like to pursue treatment at this time. Answered all questions and discussed follow up. Encouraged patient to contact us with any questions or concerns.    Encouraged Patient to keep future appointment    Take medications as prescribed     Patient to present to Emergency Department for thoughts to harm herself or others      Return to Clinic: 3 months or sooner if needed    BREANNA Slade-BC

## 2023-10-23 DIAGNOSIS — Z30.011 ENCOUNTER FOR INITIAL PRESCRIPTION OF CONTRACEPTIVE PILLS: ICD-10-CM

## 2023-10-23 RX ORDER — NORGESTIMATE AND ETHINYL ESTRADIOL 7DAYSX3 LO
1 KIT ORAL DAILY
Qty: 84 TABLET | Refills: 0 | Status: SHIPPED | OUTPATIENT
Start: 2023-10-23 | End: 2024-01-12

## 2023-10-23 RX ORDER — NORGESTIMATE AND ETHINYL ESTRADIOL 7DAYSX3 LO
KIT ORAL
Qty: 84 TABLET | Refills: 0 | OUTPATIENT
Start: 2023-10-23

## 2023-10-23 NOTE — TELEPHONE ENCOUNTER
Refill Decision Note   Storym Greeneseverino  is requesting a refill authorization.  Brief Assessment and Rationale for Refill:  Quick Discontinue     Medication Therapy Plan:  Waiting for provider's review on duplicate request      Comments:     Note composed:9:43 AM 10/23/2023

## 2023-10-23 NOTE — TELEPHONE ENCOUNTER
Refill Routing Note   Medication(s) are not appropriate for processing by [facility] for the following reason(s):      Patient not seen by provider within 15 months: future office visit 1/17/24    [facility] action(s):  Defer Care Due:  None identified          Appointments  past 12m or future 3m with PCP    Date Provider   Last Visit   8/19/2021 Araseli Toledo, DO   Next Visit   1/17/2024 Araseli Toledo, DO   ED visits in past 90 days: 0        Note composed:9:43 AM 10/23/2023

## 2023-10-23 NOTE — TELEPHONE ENCOUNTER
----- Message from Rufino Bonilla sent at 10/23/2023  1:29 PM CDT -----  Patient called in about prescription refill for her birth control. Please give patient a call to advise.

## 2023-10-24 ENCOUNTER — OFFICE VISIT (OUTPATIENT)
Dept: PSYCHIATRY | Facility: CLINIC | Age: 43
End: 2023-10-24
Payer: COMMERCIAL

## 2023-10-24 DIAGNOSIS — F33.1 MODERATE EPISODE OF RECURRENT MAJOR DEPRESSIVE DISORDER: Primary | Chronic | ICD-10-CM

## 2023-10-24 DIAGNOSIS — F41.1 GAD (GENERALIZED ANXIETY DISORDER): ICD-10-CM

## 2023-10-24 DIAGNOSIS — F90.0 ATTENTION DEFICIT HYPERACTIVITY DISORDER (ADHD), PREDOMINANTLY INATTENTIVE TYPE: Chronic | ICD-10-CM

## 2023-10-24 PROCEDURE — 90847 PR FAMILY PSYCHOTHERAPY W/ PT, 50 MIN: ICD-10-PCS | Mod: 95,,, | Performed by: SOCIAL WORKER

## 2023-10-24 PROCEDURE — 1159F PR MEDICATION LIST DOCUMENTED IN MEDICAL RECORD: ICD-10-PCS | Mod: CPTII,95,, | Performed by: SOCIAL WORKER

## 2023-10-24 PROCEDURE — 90847 FAMILY PSYTX W/PT 50 MIN: CPT | Mod: 95,,, | Performed by: SOCIAL WORKER

## 2023-10-24 PROCEDURE — 1159F MED LIST DOCD IN RCRD: CPT | Mod: CPTII,95,, | Performed by: SOCIAL WORKER

## 2023-10-25 NOTE — PROGRESS NOTES
Individual Psychotherapy (PhD/LCSW)    10/24/2023    Site:  [facility]         Therapeutic Intervention: Met with patient.  Outpatient - Insight oriented psychotherapy 45 min - CPT code 52665, Outpatient - Behavior modifying psychotherapy 45 min - CPT code 56465, Outpatient - Supportive psychotherapy 45 min - CPT Code 96848, and Outpatient - Interactive psychotherapy 45 min - CPT code 49084    Chief complaint/reason for encounter: attention deficit, depression, anxiety, and behavior     Interval history and content of current session: Pt presents for follow up session. Pt was last seen 1-month ago.  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 Pt continues to contemplate the pro's & con's of remaining her marriage. Pt continues to explore ways to reduce conflict and have more of strategy to handling the daily activities that generally lead to conflict with her .  Pt reports she also continues to contemplate her marriage and if she can remain in it for her family. Pt reports no other concerns. Pt is now focusing on getting her kids ready to return back to school in a few weeks.     Treatment plan:  Target symptoms: distractability, recurrent depression, anxiety , marital dynamics.   Why chosen therapy is appropriate versus another modality: relevant to diagnosis, patient responds to this modality, evidence based practice  Outcome monitoring methods: self-report, observation  Therapeutic intervention type: insight oriented psychotherapy, behavior modifying psychotherapy, supportive psychotherapy, interactive psychotherapy    Risk parameters:  Patient reports no suicidal ideation  Patient reports no homicidal ideation  Patient reports no  self-injurious behavior  Patient reports no violent behavior    Verbal deficits: None    Patient's response to intervention:  The patient's response to intervention is accepting, motivated.    Progress toward goals and other mental status changes:  The patient's progress toward goals is excellent.    Diagnosis:     ICD-10-CM ICD-9-CM   1. Moderate episode of recurrent major depressive disorder  F33.1 296.32   2. JAMEY (generalized anxiety disorder)  F41.1 300.02   3. Attention deficit hyperactivity disorder (ADHD), predominantly inattentive type  F90.0 314.00       Plan:  individual psychotherapy    Return to clinic: 1 month    Length of Service (minutes): 45

## 2023-11-30 ENCOUNTER — PATIENT MESSAGE (OUTPATIENT)
Dept: PSYCHIATRY | Facility: CLINIC | Age: 43
End: 2023-11-30
Payer: COMMERCIAL

## 2023-11-30 DIAGNOSIS — F90.0 ATTENTION DEFICIT HYPERACTIVITY DISORDER (ADHD), PREDOMINANTLY INATTENTIVE TYPE: ICD-10-CM

## 2023-11-30 RX ORDER — DEXTROAMPHETAMINE SACCHARATE, AMPHETAMINE ASPARTATE, DEXTROAMPHETAMINE SULFATE AND AMPHETAMINE SULFATE 3.75; 3.75; 3.75; 3.75 MG/1; MG/1; MG/1; MG/1
15 TABLET ORAL 2 TIMES DAILY
Qty: 60 TABLET | Refills: 0 | Status: SHIPPED | OUTPATIENT
Start: 2023-11-30 | End: 2024-01-16 | Stop reason: SDUPTHER

## 2024-01-12 DIAGNOSIS — Z30.011 ENCOUNTER FOR INITIAL PRESCRIPTION OF CONTRACEPTIVE PILLS: ICD-10-CM

## 2024-01-12 RX ORDER — NORGESTIMATE AND ETHINYL ESTRADIOL 7DAYSX3 LO
1 KIT ORAL
Qty: 84 TABLET | Refills: 0 | Status: SHIPPED | OUTPATIENT
Start: 2024-01-12

## 2024-01-12 NOTE — TELEPHONE ENCOUNTER
Refill Routing Note   Medication(s) are not appropriate for processing by [facility] for the following reason(s):        Patient not seen by provider within 15 months    [facility] action(s):  Defer               Appointments  past 12m or future 3m with PCP    Date Provider   Last Visit   8/19/2021 Araseli Toledo, DO   Next Visit   1/17/2024 Araseli Toledo, DO   ED visits in past 90 days: 0        Note composed:3:27 PM 01/12/2024

## 2024-01-16 ENCOUNTER — PATIENT MESSAGE (OUTPATIENT)
Dept: PSYCHIATRY | Facility: CLINIC | Age: 44
End: 2024-01-16
Payer: COMMERCIAL

## 2024-01-16 DIAGNOSIS — F90.0 ATTENTION DEFICIT HYPERACTIVITY DISORDER (ADHD), PREDOMINANTLY INATTENTIVE TYPE: ICD-10-CM

## 2024-01-16 RX ORDER — DEXTROAMPHETAMINE SACCHARATE, AMPHETAMINE ASPARTATE, DEXTROAMPHETAMINE SULFATE AND AMPHETAMINE SULFATE 3.75; 3.75; 3.75; 3.75 MG/1; MG/1; MG/1; MG/1
15 TABLET ORAL 2 TIMES DAILY
Qty: 60 TABLET | Refills: 0 | Status: SHIPPED | OUTPATIENT
Start: 2024-01-16 | End: 2024-02-01 | Stop reason: SDUPTHER

## 2024-01-17 ENCOUNTER — TELEPHONE (OUTPATIENT)
Dept: OBSTETRICS AND GYNECOLOGY | Facility: CLINIC | Age: 44
End: 2024-01-17
Payer: COMMERCIAL

## 2024-01-17 NOTE — TELEPHONE ENCOUNTER
----- Message from Carol Segal sent at 1/17/2024  8:27 AM CST -----  Name of Who is Calling:SERGE TAPIA [[id number]]                   What is the request in detail: PT needs to reschedule her appt. I tried and couldn't pull up an appt. Please assist                   Can the clinic reply by [facility]: No                   What Number to Call Back if not in [facility]:[phone]

## 2024-02-01 ENCOUNTER — OFFICE VISIT (OUTPATIENT)
Dept: PSYCHIATRY | Facility: CLINIC | Age: 44
End: 2024-02-01
Payer: COMMERCIAL

## 2024-02-01 DIAGNOSIS — F90.0 ATTENTION DEFICIT HYPERACTIVITY DISORDER (ADHD), PREDOMINANTLY INATTENTIVE TYPE: ICD-10-CM

## 2024-02-01 DIAGNOSIS — F41.1 GAD (GENERALIZED ANXIETY DISORDER): Primary | ICD-10-CM

## 2024-02-01 DIAGNOSIS — F33.42 RECURRENT MAJOR DEPRESSIVE DISORDER, IN FULL REMISSION: ICD-10-CM

## 2024-02-01 PROCEDURE — 99214 OFFICE O/P EST MOD 30 MIN: CPT | Mod: 95,,, | Performed by: NURSE PRACTITIONER

## 2024-02-01 RX ORDER — DEXTROAMPHETAMINE SACCHARATE, AMPHETAMINE ASPARTATE, DEXTROAMPHETAMINE SULFATE AND AMPHETAMINE SULFATE 3.75; 3.75; 3.75; 3.75 MG/1; MG/1; MG/1; MG/1
15 TABLET ORAL 2 TIMES DAILY
Qty: 60 TABLET | Refills: 0 | Status: SHIPPED | OUTPATIENT
Start: 2024-04-16 | End: 2024-05-19 | Stop reason: SDUPTHER

## 2024-02-01 RX ORDER — DEXTROAMPHETAMINE SACCHARATE, AMPHETAMINE ASPARTATE, DEXTROAMPHETAMINE SULFATE AND AMPHETAMINE SULFATE 3.75; 3.75; 3.75; 3.75 MG/1; MG/1; MG/1; MG/1
15 TABLET ORAL 2 TIMES DAILY
Qty: 60 TABLET | Refills: 0 | Status: SHIPPED | OUTPATIENT
Start: 2024-02-16 | End: 2024-03-17

## 2024-02-01 RX ORDER — DEXTROAMPHETAMINE SACCHARATE, AMPHETAMINE ASPARTATE, DEXTROAMPHETAMINE SULFATE AND AMPHETAMINE SULFATE 3.75; 3.75; 3.75; 3.75 MG/1; MG/1; MG/1; MG/1
15 TABLET ORAL 2 TIMES DAILY
Qty: 60 TABLET | Refills: 0 | Status: SHIPPED | OUTPATIENT
Start: 2024-03-16 | End: 2024-04-15

## 2024-02-01 NOTE — PROGRESS NOTES
Outpatient Psychiatry Follow-Up Visit (MD/NP)    2/1/2024      The patient location is: [place], [place]  The chief complaint leading to consultation is: inattention, depression and anxiety    Visit type: audiovisual  Thank you for your consult to [facility]. We have reviewed the patient chart. This patient     Face to Face time with patient: 15 minutes  20 minutes of total time spent on the encounter, which includes face to face time and non-face to face time preparing to see the patient (eg, review of tests), Obtaining and/or reviewing separately obtained history, Documenting clinical information in the electronic or other health record, Independently interpreting results (not separately reported) and communicating results to the patient/family/caregiver, or Care coordination (not separately reported).         Each patient to whom he or she provides medical services by telemedicine is:  (1) informed of the relationship between the physician and patient and the respective role of any other health care provider with respect to management of the patient; and (2) notified that he or she may decline to receive medical services by telemedicine and may withdraw from such care at any time.    Notes:       Clinical Status of Patient:  Outpatient (Ambulatory)    Chief Complaint:  Stormy Carlisle is a 43 y.o. female who presents today for follow-up of depression, anxiety and attention problems. She is taking  20 mg Prozac & adderall 15 mg BID for ADHD.    Current Psychiatric Medications:  Prozac 20 mg daily for mood  Adderall 15 mg PO BID  Xanax 0.25 mg po RPN (rarely takes for severe anxiety)    Interval History and Content of Current Session:    Patient stated that she has been doing well since her last visit. She stated her and her family are doing well. She stated she is participating in mini parade for [employer]. She continues to report well managed anxiety with prozac 20 mg po daily and her effective  "coping strategies. She denied feeling depressed and when she feels stressed she continues to utilize healthy coping skills like swimming, gardening, and playing with her kids. She described her mood as "fine and pretty even" and her affect was appropriate and mood congruent. She continues to report adderall 15 mg po BID is still very helpful in increasing her focus, attention and productivity. She continues to report good sleep and appetite. She continues to report managed depression and anxiety with Prozac 20 mg po daily and coping strategies. She denied SE and AE including jitteriness, CP, or palpitations. She stated she takes xanax occasionally and only when she feels severely anxious or panicky.    She denied SI/HI/AVH. She denied suicidal ideation and no suicide plan or intent. She denied hopelessness, amotvation, or anhedonia. No objective signs or symptoms of jnony or psychosis. Patient VS are within normal range and no history of hypertension. She denied alcohol or drug abuse.     Past med trials: Wellbutrin (helped mood/motivation, but caused her to pass out, have decrease appetite, weight loss), Paxil was very hard to come off of and does not remember how much it helped. Prozac worked but was insufficiently helpful, venlafaxine (brain zaps and interfered with sleep) and vistaril (too groggy, sedating and made her feel tired that caused her to feel anxious and depressed)    Psychiatric Review Of Systems - Is patient experiencing or having changes in:  sleep: no  appetite: no  weight: no  energy/anergy: no  interest/pleasure/anhedonia: no  somatic symptoms: no  anxiety/panic: yes but manageable  guilty/hopelessness: no  concentration: no. Improved with adderall  S.I.B.s/risky behavior: no  Irritability: no  Racing thoughts: no  Impulsive behaviors: no  Paranoia:no  AVH:no  Suicidal thoughts/plan/intent: no  SE: no  ETOH: no  Drugs: no    Review of Systems     MEDICAL REVIEW OF SYSTEMS  History obtained from " "the patient  General : NO chills or fever  Eyes: NO  visual changes  ENT: NO  nasal discharge or sore throat  Endocrine: NO weight changes  Dermatological: NO rashes  Respiratory: NO cough, shortness of breath  Cardiovascular: NO chest pain, palpitations or racing heart  Gastrointestinal: NO nausea, vomiting, constipation or diarrhea  Musculoskeletal: NO muscle pain or stiffness  Neurological: NO , dizziness, headaches or tremors  Psychiatric: please see HPI        Past Medical, Family and Social History: The patient's past medical, family and social history have been reviewed and updated as appropriate within the electronic medical record - see encounter notes.  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 Has been through CBT but not recently. Med trials as above plus ambien per chart in 2015    Compliance: yes    Side effects: None    Risk Parameters:  Patient reports no suicidal ideation  Patient reports no homicidal ideation  Patient reports no self-injurious behavior  Patient reports no violent behavior    Exam (detailed: at least 9 elements; comprehensive: all 15 elements)   Constitutional  Vitals:  Most recent vital signs, dated greater than 90 days prior to this appointment, were reviewed.   There were no vitals filed for this visit.     General:  unremarkable, age appropriate     Musculoskeletal  Muscle Strength/Tone:  no dystonia, no tremor, no tic   Gait & Station:  Normal gait     Psychiatric  Speech:  no latency; no press, spontaneous,    Mood & Affect:  "Fine and pretty even"  Mood congruent and appropriate   Thought Process:  normal and logical, logical   Associations:  intact   Thought Content:  normal, no suicidality, no homicidality, delusions, or paranoia   Insight:  intact, has awareness of illness   Judgement: behavior is adequate to circumstances, age appropriate   Orientation:  grossly intact   Memory: intact for content " of interview, able to remember recent events- yes, able to remember remote events- yes   Language: grossly intact, able to name, able to repeat   Attention Span & Concentration:  able to focus with adderall   Fund of Knowledge:  intact and appropriate to age and level of education     Assessment and Diagnosis   Status/Progress: Based on the examination today, the patient's problem(s) is/are not well controlled.  New problems have not been presented today.   Co-morbidities, Diagnostic uncertainty and Lack of compliance are not complicating management of the primary condition.  There are no active rule-out diagnoses for this patient at this time.     General Impression: Doing well on her current medication regimen of adderall 15 mg po BID and prozac 20 mg po daily. No changes in dosages due to their effectiveness in treating her ADHD, depression, and anxiety.      ICD-10-CM ICD-9-CM   1. JAMEY (generalized anxiety disorder)  F41.1 300.02   2. Attention deficit hyperactivity disorder (ADHD), predominantly inattentive type  F90.0 314.00   3. Recurrent major depressive disorder, in full remission  F33.42 296.36                Intervention/Counseling/Treatment Plan     Depression & Anxiety  Continue prozac 20 mg to help with depressive sx  Continue xanax 0.25 mg po PRN daily for severe anxiety and panic attacks (rarely takes it)  Informed pt of the risks of continuous Benzodiazepine use including tolerance, dependence, dizziness, drowsiness, memory problems, and withdrawals that may be life threatening upon abrupt cessation. Also advised not to take Benzodiazepines with Opiates or other sedatives and also not to drive or operate heavy machinery while using Benzodiazepines. Avoid alcohol with Benzodiazapine. Patient agreed to take it and verbalized understanding    Reviewed patient's most recent vitals     ADHD, inattentive type  Continue Adderall IR 15 mg PO BID. Checked  and no signs of misuse.      -Continue  psychotherapy     Previously discussed risks, benefits, AE's (incl serotonin syndrome) SE's (including but not limited to GI sx, insomnia, agitation/anxiety/activation, appetite changes, HA, hypotension, others) inherent unpredictability of illness and treatment option    Discussed informed consent, diagnosis, risks and benefits of proposed treatment above vs alternative treatments vs no treatment, and potential side effects of these treatments. The patient expresses understanding of the above and displays the capacity to agree with this treatment given said understanding. Patient also agrees that, currently, the benefits outweigh the risks and would like to pursue treatment at this time. Answered all questions and discussed follow up. Encouraged patient to contact us with any questions or concerns.    Encouraged Patient to keep future appointment    Take medications as prescribed     Patient to present to Emergency Department for thoughts to harm herself or others      Return to Clinic: 3 months or sooner if needed    Gina Murphy, JOSE MANUELP-BC

## 2024-03-13 ENCOUNTER — PATIENT MESSAGE (OUTPATIENT)
Dept: OBSTETRICS AND GYNECOLOGY | Facility: CLINIC | Age: 44
End: 2024-03-13
Payer: COMMERCIAL

## 2024-03-13 DIAGNOSIS — Z12.11 COLON CANCER SCREENING: Primary | ICD-10-CM

## 2024-03-19 ENCOUNTER — PATIENT MESSAGE (OUTPATIENT)
Dept: PSYCHIATRY | Facility: CLINIC | Age: 44
End: 2024-03-19
Payer: COMMERCIAL

## 2024-03-20 ENCOUNTER — OFFICE VISIT (OUTPATIENT)
Dept: OBSTETRICS AND GYNECOLOGY | Facility: CLINIC | Age: 44
End: 2024-03-20
Payer: COMMERCIAL

## 2024-03-20 ENCOUNTER — HOSPITAL ENCOUNTER (OUTPATIENT)
Dept: RADIOLOGY | Facility: OTHER | Age: 44
Discharge: HOME | End: 2024-03-20
Attending: OBSTETRICS & GYNECOLOGY
Payer: COMMERCIAL

## 2024-03-20 VITALS
WEIGHT: 127.19 LBS | BODY MASS INDEX: 24.04 KG/M2 | DIASTOLIC BLOOD PRESSURE: 63 MMHG | SYSTOLIC BLOOD PRESSURE: 107 MMHG

## 2024-03-20 DIAGNOSIS — Z12.4 PAP SMEAR FOR CERVICAL CANCER SCREENING: ICD-10-CM

## 2024-03-20 DIAGNOSIS — Z12.31 VISIT FOR SCREENING MAMMOGRAM: ICD-10-CM

## 2024-03-20 DIAGNOSIS — Z01.419 WELL WOMAN EXAM WITH ROUTINE GYNECOLOGICAL EXAM: Primary | ICD-10-CM

## 2024-03-20 DIAGNOSIS — Z30.011 ENCOUNTER FOR INITIAL PRESCRIPTION OF CONTRACEPTIVE PILLS: ICD-10-CM

## 2024-03-20 DIAGNOSIS — N95.1 PERIMENOPAUSAL: ICD-10-CM

## 2024-03-20 PROBLEM — Z23 NEED FOR HPV VACCINATION: Status: RESOLVED | Noted: 2022-10-31 | Resolved: 2024-03-20

## 2024-03-20 PROCEDURE — 87624 HPV HI-RISK TYP POOLED RSLT: CPT | Performed by: NURSE PRACTITIONER

## 2024-03-20 PROCEDURE — 77067 SCR MAMMO BI INCL CAD: CPT | Mod: 26,,, | Performed by: RADIOLOGY

## 2024-03-20 PROCEDURE — 77067 SCR MAMMO BI INCL CAD: CPT | Mod: TC

## 2024-03-20 PROCEDURE — 3074F SYST BP LT 130 MM HG: CPT | Mod: CPTII,S$GLB,, | Performed by: NURSE PRACTITIONER

## 2024-03-20 PROCEDURE — 1159F MED LIST DOCD IN RCRD: CPT | Mod: CPTII,S$GLB,, | Performed by: NURSE PRACTITIONER

## 2024-03-20 PROCEDURE — 99396 PREV VISIT EST AGE 40-64: CPT | Mod: S$GLB,,, | Performed by: NURSE PRACTITIONER

## 2024-03-20 PROCEDURE — 3008F BODY MASS INDEX DOCD: CPT | Mod: CPTII,S$GLB,, | Performed by: NURSE PRACTITIONER

## 2024-03-20 PROCEDURE — 88175 CYTOPATH C/V AUTO FLUID REDO: CPT | Performed by: NURSE PRACTITIONER

## 2024-03-20 PROCEDURE — 3078F DIAST BP <80 MM HG: CPT | Mod: CPTII,S$GLB,, | Performed by: NURSE PRACTITIONER

## 2024-03-20 PROCEDURE — 77063 BREAST TOMOSYNTHESIS BI: CPT | Mod: 26,,, | Performed by: RADIOLOGY

## 2024-03-20 PROCEDURE — 99999 PR PBB SHADOW E&M-EST. PATIENT-LVL III: CPT | Mod: PBBFAC,,, | Performed by: NURSE PRACTITIONER

## 2024-03-20 RX ORDER — NORGESTIMATE AND ETHINYL ESTRADIOL 7DAYSX3 LO
1 KIT ORAL DAILY
Qty: 84 TABLET | Refills: 4 | Status: SHIPPED | OUTPATIENT
Start: 2024-03-20

## 2024-03-20 NOTE — PROGRESS NOTES
CC: Annual  HPI: Pt is a 44 y.o.  female who presents for routine annual exam. She uses vasectomy for contraception. She does not want STD screening. Stopped ocps last month. Periods are heavier and more cramping then on the pills. Mood changes are severe the week prior to and during menses. In the past she took continuous ocps which really helped.     FH:   Breast cancer: paternal aunt  Colon cancer: paternal grandmother  Ovarian cancer: none  Uterine cancer: none    HPV vaccine: no  Last pap smear:  nilm, hpv neg  History of abnormal pap smears: no    Colonoscopy: due (referral placed)  DEXA: na  Mammogram: results pending  STD history: no  Birth control: none  OB history:   Tobacco use: no    ROS:  GENERAL: Feeling well overall. Denies fever or chills.   SKIN: Denies rash or lesions.   HEAD: Denies head injury or headache.   NODES: Denies enlarged lymph nodes.   CHEST: Denies chest pain or shortness of breath.   CARDIOVASCULAR: Denies palpitations or left sided chest pain.   ABDOMEN: No abdominal pain, constipation, diarrhea, nausea, vomiting or rectal bleeding.   URINARY: No dysuria, hematuria, or burning on urination.  REPRODUCTIVE: See HPI.   BREASTS: Denies pain, lumps, or nipple discharge.   HEMATOLOGIC: No easy bruisability or excessive bleeding.   MUSCULOSKELETAL: Denies joint pain or swelling.   NEUROLOGIC: Denies syncope or weakness.   PSYCHIATRIC: Denies depression, anxiety or mood swings.    PE:   APPEARANCE: Well nourished, well developed, White female in no acute distress.  NODES: no cervical, supraclavicular, or inguinal lymphadenopathy  BREASTS: Symmetrical, no skin changes or visible lesions. No palpable masses, nipple discharge or adenopathy bilaterally.  ABDOMEN: Soft. No tenderness or masses. No distention. No hernias palpated. No CVA tenderness.  VULVA: No lesions. Normal external female genitalia.  URETHRAL MEATUS: Normal size and location, no lesions, no prolapse.  URETHRA: No  masses, tenderness, or prolapse.  VAGINA: Moist. No lesions or lacerations noted. No abnormal discharge present. No odor present.   CERVIX: No lesions or discharge. No cervical motion tenderness. Nabothian cyst noted @ 12 o'clock position  UTERUS: Normal size, regular shape, mobile, non-tender.  ADNEXA: No tenderness. No fullness or masses palpated in the adnexal regions.   ANUS PERINEUM: Normal.      Diagnosis:  1. Well woman exam with routine gynecological exam    2. Pap smear for cervical cancer screening    3. Perimenopausal    4. Encounter for initial prescription of contraceptive pills        Plan:     Orders Placed This Encounter    HPV High Risk Genotypes, PCR    Liquid-Based Pap Smear, Screening    norgestimate-ethinyl estradioL (TRI-LO-TERRELL) 0.18/0.215/0.25 mg-25 mcg tablet     Pap updated  Declines std screening  Mammogram results pending  Cscopy to be scheduled  DXA na    Patient was counseled today on the new ACS guidelines for cervical cytology screening as well as the current recommendations for breast cancer screening. She was counseled to follow up with her PCP for other routine health maintenance. Counseling session lasted approximately 10 minutes, and all her questions were answered.  For women over the age of 65, you can stop having cervical cancer screenings if you have never had abnormal cervical cells or cervical cancer, and youve had three negative Pap tests in a row. (You also can stop screening if youve had two negative Pap and HPV tests in a row in the past 10 years, with at least one test in the past 5 years.),    Follow-up with me in 1 year for routine exam; pap in 3 years.     I spent a total of  minutes on the day of the visit.This includes face to face time and non-face to face time preparing to see the patient (eg, review of tests), obtaining and/or reviewing separately obtained history, documenting clinical information in the electronic or other health record, independently  interpreting results and communicating results to the patient/family/caregiver, or care coordinator.    As of April 1, 2021, the Cures Act has been passed nationally. This new law requires that all doctors progress notes, lab results, pathology reports and radiology reports be released IMMEDIATELY to the patient in the patient portal. That means that the results are released to you at the EXACT same time they are released to me. Therefore, with all of the patients that I have I am not able to reply to each patient exactly when the results come in. So there will be a delay from when you see the results to when I see them and have time to come up with a response to send you. Also I only see these results when I am on the computer at work. So if the results come in over the weekend or after 5 pm of a work day, I will not see them until the next business day. As you can tell, this is a challenge as a provider to give every patient the quick response they hope for and deserve. So please be patient!     Thanks for your understanding and patience.

## 2024-03-27 LAB
FINAL PATHOLOGIC DIAGNOSIS: NORMAL
Lab: NORMAL

## 2024-05-14 ENCOUNTER — TELEPHONE (OUTPATIENT)
Dept: ENDOSCOPY | Facility: HOSPITAL | Age: 44
End: 2024-05-14
Payer: COMMERCIAL

## 2024-05-14 DIAGNOSIS — Z12.11 SPECIAL SCREENING FOR MALIGNANT NEOPLASMS, COLON: Primary | ICD-10-CM

## 2024-05-14 NOTE — TELEPHONE ENCOUNTER
Contacted patient re: scheduling colonoscopy. Patient wants to schedule procedure at [facility]. No slots available at this time. PAT appt scheduled. Patient verbalized  understanding.

## 2024-05-19 DIAGNOSIS — F90.0 ATTENTION DEFICIT HYPERACTIVITY DISORDER (ADHD), PREDOMINANTLY INATTENTIVE TYPE: ICD-10-CM

## 2024-05-20 RX ORDER — DEXTROAMPHETAMINE SACCHARATE, AMPHETAMINE ASPARTATE, DEXTROAMPHETAMINE SULFATE AND AMPHETAMINE SULFATE 3.75; 3.75; 3.75; 3.75 MG/1; MG/1; MG/1; MG/1
15 TABLET ORAL 2 TIMES DAILY
Qty: 60 TABLET | Refills: 0 | Status: SHIPPED | OUTPATIENT
Start: 2024-05-20 | End: 2024-06-03 | Stop reason: SDUPTHER

## 2024-06-03 ENCOUNTER — OFFICE VISIT (OUTPATIENT)
Dept: PSYCHIATRY | Facility: CLINIC | Age: 44
End: 2024-06-03
Payer: COMMERCIAL

## 2024-06-03 DIAGNOSIS — F33.42 RECURRENT MAJOR DEPRESSIVE DISORDER, IN FULL REMISSION: ICD-10-CM

## 2024-06-03 DIAGNOSIS — F90.0 ATTENTION DEFICIT HYPERACTIVITY DISORDER (ADHD), PREDOMINANTLY INATTENTIVE TYPE: Primary | ICD-10-CM

## 2024-06-03 DIAGNOSIS — F41.1 GAD (GENERALIZED ANXIETY DISORDER): ICD-10-CM

## 2024-06-03 PROCEDURE — 99214 OFFICE O/P EST MOD 30 MIN: CPT | Mod: 95,,, | Performed by: NURSE PRACTITIONER

## 2024-06-03 RX ORDER — DEXTROAMPHETAMINE SACCHARATE, AMPHETAMINE ASPARTATE MONOHYDRATE, DEXTROAMPHETAMINE SULFATE AND AMPHETAMINE SULFATE 3.75; 3.75; 3.75; 3.75 MG/1; MG/1; MG/1; MG/1
15 CAPSULE, EXTENDED RELEASE ORAL 2 TIMES DAILY
Qty: 60 CAPSULE | Refills: 0 | Status: SHIPPED | OUTPATIENT
Start: 2024-08-20 | End: 2024-06-03

## 2024-06-03 RX ORDER — DEXTROAMPHETAMINE SACCHARATE, AMPHETAMINE ASPARTATE MONOHYDRATE, DEXTROAMPHETAMINE SULFATE AND AMPHETAMINE SULFATE 3.75; 3.75; 3.75; 3.75 MG/1; MG/1; MG/1; MG/1
15 CAPSULE, EXTENDED RELEASE ORAL 2 TIMES DAILY
Qty: 60 CAPSULE | Refills: 0 | Status: SHIPPED | OUTPATIENT
Start: 2024-07-20 | End: 2024-06-03

## 2024-06-03 RX ORDER — FLUOXETINE HYDROCHLORIDE 20 MG/1
20 CAPSULE ORAL DAILY
Qty: 90 CAPSULE | Refills: 1 | Status: SHIPPED | OUTPATIENT
Start: 2024-06-03

## 2024-06-03 RX ORDER — DEXTROAMPHETAMINE SACCHARATE, AMPHETAMINE ASPARTATE, DEXTROAMPHETAMINE SULFATE AND AMPHETAMINE SULFATE 3.75; 3.75; 3.75; 3.75 MG/1; MG/1; MG/1; MG/1
15 TABLET ORAL 2 TIMES DAILY
Qty: 60 TABLET | Refills: 0 | Status: SHIPPED | OUTPATIENT
Start: 2024-06-20 | End: 2024-07-20

## 2024-06-03 RX ORDER — DEXTROAMPHETAMINE SACCHARATE, AMPHETAMINE ASPARTATE, DEXTROAMPHETAMINE SULFATE AND AMPHETAMINE SULFATE 3.75; 3.75; 3.75; 3.75 MG/1; MG/1; MG/1; MG/1
15 TABLET ORAL 2 TIMES DAILY
Qty: 60 TABLET | Refills: 0 | Status: SHIPPED | OUTPATIENT
Start: 2024-08-20 | End: 2024-09-19

## 2024-06-03 RX ORDER — DEXTROAMPHETAMINE SACCHARATE, AMPHETAMINE ASPARTATE, DEXTROAMPHETAMINE SULFATE AND AMPHETAMINE SULFATE 3.75; 3.75; 3.75; 3.75 MG/1; MG/1; MG/1; MG/1
15 TABLET ORAL 2 TIMES DAILY
Qty: 60 TABLET | Refills: 0 | Status: SHIPPED | OUTPATIENT
Start: 2024-07-20 | End: 2024-08-19

## 2024-06-03 NOTE — PROGRESS NOTES
Outpatient Psychiatry Follow-Up Visit (MD/NP)    6/3/2024      The patient location is: [place], [place]  The chief complaint leading to consultation is: inattention, depression and anxiety    Visit type: audiovisual  Thank you for your consult to [facility]. We have reviewed the patient chart. This patient     Face to Face time with patient: 17 minutes  22 minutes of total time spent on the encounter, which includes face to face time and non-face to face time preparing to see the patient (eg, review of tests), Obtaining and/or reviewing separately obtained history, Documenting clinical information in the electronic or other health record, Independently interpreting results (not separately reported) and communicating results to the patient/family/caregiver, or Care coordination (not separately reported).         Each patient to whom he or she provides medical services by telemedicine is:  (1) informed of the relationship between the physician and patient and the respective role of any other health care provider with respect to management of the patient; and (2) notified that he or she may decline to receive medical services by telemedicine and may withdraw from such care at any time.    Notes:       Clinical Status of Patient:  Outpatient (Ambulatory)    Chief Complaint:  Stormy Carlisle is a 44 y.o. female who presents today for follow-up of depression, anxiety and attention problems. She is taking  20 mg Prozac & adderall 15 mg BID for ADHD.    Current Psychiatric Medications:  Prozac 20 mg daily for mood  Adderall 15 mg PO BID  Xanax 0.25 mg po RPN (rarely takes for severe anxiety)    Interval History and Content of Current Session:    Patient stated that she has been doing well since her last visit. She stated she has been doing well since her last visit. She reported her children are in an art camp this summer and they doing well. She stated she continues to work on her art and enjoys it. She  "continues to report well managed anxiety with prozac 20 mg po daily and her effective coping strategies. She denied feeling depressed and when she feels stressed she continues to utilize healthy coping skills like swimming, gardening, her art, and playing with her kids. She described her mood as "good" and her affect was appropriate and mood congruent with full range.    She continues to report adderall 15 mg po BID is still very helpful in increasing her focus, attention and productivity. She continues to report good sleep and appetite. She continues to report managed depression and anxiety with Prozac 20 mg po daily and coping strategies. She denied SE and AE including jitteriness, CP, or palpitations. She stated she takes xanax occasionally and only when she feels severely anxious or panicky.    She denied SI/HI/AVH. She denied suicidal ideation and no suicide plan or intent. She denied suicide attempts.  She denied hopelessness, amotvation, or anhedonia. No objective signs or symptoms of jonny or psychosis. Patient VS are within normal range and no history of hypertension. She denied alcohol or drug abuse.     Past med trials: Wellbutrin (helped mood/motivation, but caused her to pass out, have decrease appetite, weight loss), Paxil was very hard to come off of and does not remember how much it helped. Prozac worked but was insufficiently helpful, venlafaxine (brain zaps and interfered with sleep) and vistaril (too groggy, sedating and made her feel tired that caused her to feel anxious and depressed)    Psychiatric Review Of Systems - Is patient experiencing or having changes in:  sleep: no  appetite: no  weight: no  energy/anergy: no  interest/pleasure/anhedonia: no  somatic symptoms: no  anxiety/panic: yes but manageable  guilty/hopelessness: no  concentration: yes Improved with adderall  S.I.B.s/risky behavior: no  Irritability: no  Racing thoughts: no  Impulsive behaviors: no  Paranoia:no  AVH:no  Suicidal " "thoughts/plan/intent: no  SE: no  ETOH: no  Drugs: no    Review of Systems     MEDICAL REVIEW OF SYSTEMS  History obtained from the patient  General : NO chills or fever  Eyes: NO  visual changes  ENT: NO  nasal discharge or sore throat  Endocrine: NO weight changes  Dermatological: NO rashes  Respiratory: NO cough, shortness of breath  Cardiovascular: NO chest pain, palpitations or racing heart  Gastrointestinal: NO nausea, vomiting, constipation or diarrhea  Musculoskeletal: NO muscle pain or stiffness  Neurological: NO , dizziness, headaches or tremors  Psychiatric: please see HPI        Past Medical, Family and Social History: The patient's past medical, family and social history have been reviewed and updated as appropriate within the electronic medical record - see encounter notes.  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 Has been through CBT but not recently. Med trials as above plus ambien per chart in 2015    Compliance: yes    Side effects: None    Risk Parameters:  Patient reports no suicidal ideation  Patient reports no homicidal ideation  Patient reports no self-injurious behavior  Patient reports no violent behavior    Exam (detailed: at least 9 elements; comprehensive: all 15 elements)   Constitutional  Vitals:  Most recent vital signs, dated greater than 90 days prior to this appointment, were reviewed.   There were no vitals filed for this visit.     General:  unremarkable, age appropriate     Musculoskeletal  Muscle Strength/Tone:  no dystonia, no tremor, no tic   Gait & Station:  Normal gait     Psychiatric  Speech:  no latency; no press, spontaneous,    Mood & Affect:  "Good"  Mood congruent and appropriate   Thought Process:  normal and logical, logical   Associations:  intact   Thought Content:  normal, no suicidality, no homicidality, delusions, or paranoia   Insight:  intact, has awareness of illness   Judgement: " behavior is adequate to circumstances, age appropriate   Orientation:  grossly intact   Memory: intact for content of interview, able to remember recent events- yes, able to remember remote events- yes   Language: grossly intact, able to name, able to repeat   Attention Span & Concentration:  able to focus with adderall   Fund of Knowledge:  intact and appropriate to age and level of education     Assessment and Diagnosis   Status/Progress: Based on the examination today, the patient's problem(s) is/are not well controlled.  New problems have not been presented today.   Co-morbidities, Diagnostic uncertainty and Lack of compliance are not complicating management of the primary condition.  There are no active rule-out diagnoses for this patient at this time.     General Impression: Doing well on her current medication regimen of adderall 15 mg po BID and prozac 20 mg po daily. No changes in dosages due to their effectiveness in treating her ADHD, depression, and anxiety.      ICD-10-CM ICD-9-CM   1. Attention deficit hyperactivity disorder (ADHD), predominantly inattentive type  F90.0 314.00   2. JAMEY (generalized anxiety disorder)  F41.1 300.02   3. Recurrent major depressive disorder, in full remission  F33.42 296.36                  Intervention/Counseling/Treatment Plan     Depression & Anxiety  Continue prozac 20 mg to help with depressive sx  Continue xanax 0.25 mg po PRN daily for severe anxiety and panic attacks (rarely takes it)  Informed pt of the risks of continuous Benzodiazepine use including tolerance, dependence, dizziness, drowsiness, memory problems, and withdrawals that may be life threatening upon abrupt cessation. Also advised not to take Benzodiazepines with Opiates or other sedatives and also not to drive or operate heavy machinery while using Benzodiazepines. Avoid alcohol with Benzodiazapine. Patient agreed to take it and verbalized understanding    Reviewed patient's most recent vitals      ADHD, inattentive type  Continue Adderall IR 15 mg PO BID. Checked  and no signs of misuse.  Reviewed patient's vitals and encouraged new vitals  Encouraged in-person visit and she agreed      -Continue psychotherapy     Previously discussed risks, benefits, AE's (incl serotonin syndrome) SE's (including but not limited to GI sx, insomnia, agitation/anxiety/activation, appetite changes, HA, hypotension, others) inherent unpredictability of illness and treatment option    Discussed informed consent, diagnosis, risks and benefits of proposed treatment above vs alternative treatments vs no treatment, and potential side effects of these treatments. The patient expresses understanding of the above and displays the capacity to agree with this treatment given said understanding. Patient also agrees that, currently, the benefits outweigh the risks and would like to pursue treatment at this time. Answered all questions and discussed follow up. Encouraged patient to contact us with any questions or concerns.    Encouraged Patient to keep future appointment    Take medications as prescribed     Patient to present to Emergency Department for thoughts to harm herself or others      Return to Clinic: 3 months or sooner if needed    Gina Murphy, BREANNA-BC

## 2024-07-09 ENCOUNTER — TELEPHONE (OUTPATIENT)
Dept: ENDOSCOPY | Facility: HOSPITAL | Age: 44
End: 2024-07-09

## 2024-07-09 NOTE — TELEPHONE ENCOUNTER
PAT appt to schedule  colonoscopy with no answer x2.  Unable to leave voicemail.  Portal message sent.  New refendo placed.

## 2024-07-17 ENCOUNTER — TELEPHONE (OUTPATIENT)
Dept: PSYCHIATRY | Facility: CLINIC | Age: 44
End: 2024-07-17
Payer: COMMERCIAL

## 2024-08-15 ENCOUNTER — OFFICE VISIT (OUTPATIENT)
Dept: PSYCHIATRY | Facility: CLINIC | Age: 44
End: 2024-08-15
Payer: COMMERCIAL

## 2024-08-15 ENCOUNTER — PATIENT MESSAGE (OUTPATIENT)
Dept: PSYCHIATRY | Facility: CLINIC | Age: 44
End: 2024-08-15

## 2024-08-15 VITALS — DIASTOLIC BLOOD PRESSURE: 64 MMHG | HEART RATE: 76 BPM | RESPIRATION RATE: 18 BRPM | SYSTOLIC BLOOD PRESSURE: 94 MMHG

## 2024-08-15 DIAGNOSIS — F41.1 GAD (GENERALIZED ANXIETY DISORDER): ICD-10-CM

## 2024-08-15 DIAGNOSIS — F90.0 ATTENTION DEFICIT HYPERACTIVITY DISORDER (ADHD), PREDOMINANTLY INATTENTIVE TYPE: Primary | ICD-10-CM

## 2024-08-15 RX ORDER — DEXTROAMPHETAMINE SACCHARATE, AMPHETAMINE ASPARTATE, DEXTROAMPHETAMINE SULFATE AND AMPHETAMINE SULFATE 3.75; 3.75; 3.75; 3.75 MG/1; MG/1; MG/1; MG/1
15 TABLET ORAL 2 TIMES DAILY
Qty: 60 TABLET | Refills: 0 | Status: SHIPPED | OUTPATIENT
Start: 2024-08-20 | End: 2024-09-19

## 2024-08-15 RX ORDER — DEXTROAMPHETAMINE SACCHARATE, AMPHETAMINE ASPARTATE, DEXTROAMPHETAMINE SULFATE AND AMPHETAMINE SULFATE 3.75; 3.75; 3.75; 3.75 MG/1; MG/1; MG/1; MG/1
15 TABLET ORAL 2 TIMES DAILY
Qty: 60 TABLET | Refills: 0 | Status: SHIPPED | OUTPATIENT
Start: 2024-08-15 | End: 2024-09-14

## 2024-08-15 RX ORDER — FLUOXETINE HYDROCHLORIDE 20 MG/1
20 CAPSULE ORAL DAILY
Qty: 90 CAPSULE | Refills: 1 | Status: SHIPPED | OUTPATIENT
Start: 2024-08-15

## 2024-08-15 NOTE — PROGRESS NOTES
Outpatient Psychiatry Follow-Up Visit (MD/NP)    8/15/2024      The patient location is: [place], [place]  The chief complaint leading to consultation is: inattention, depression and anxiety    Visit type: audiovisual  Thank you for your consult to [facility]. We have reviewed the patient chart. This patient     Face to Face time with patient: 15 minutes  20 minutes of total time spent on the encounter, which includes face to face time and non-face to face time preparing to see the patient (eg, review of tests), Obtaining and/or reviewing separately obtained history, Documenting clinical information in the electronic or other health record, Independently interpreting results (not separately reported) and communicating results to the patient/family/caregiver, or Care coordination (not separately reported).         Each patient to whom he or she provides medical services by telemedicine is:  (1) informed of the relationship between the physician and patient and the respective role of any other health care provider with respect to management of the patient; and (2) notified that he or she may decline to receive medical services by telemedicine and may withdraw from such care at any time.    Notes:       Clinical Status of Patient:  Outpatient (Ambulatory)    Chief Complaint:  Stormy Carlisle is a 44 y.o. female who presents today for follow-up of depression, anxiety and attention problems. She is taking  20 mg Prozac & adderall 15 mg BID for ADHD.    Current Psychiatric Medications:  Prozac 20 mg daily for mood  Adderall 15 mg PO BID  Xanax 0.25 mg po RPN (rarely takes for severe anxiety)    Interval History and Content of Current Session:    Patient stated that she has been doing well since her last visit. She reported her children are back in school and they doing well. She stated she continues to work on her art and enjoys it. She continues to report well managed anxiety with prozac 20 mg po daily and  "her effective coping strategies. She denied feeling depressed and when she feels stressed she continues to utilize healthy coping skills like swimming, gardening, her art, and playing with her kids. She described her mood as "good" and her affect was appropriate, bright, and mood congruent.    She continues to report adderall 15 mg po BID is still very helpful in increasing her focus, attention and productivity. She continues to report good sleep and appetite. She continues to report managed depression and anxiety with Prozac 20 mg po daily and coping strategies. She denied SE and AE including jitteriness, CP, or palpitations. She stated she takes xanax occasionally and only when she feels severely anxious or panicky.    She denied SI/HI/AVH. She denied suicidal ideation and no suicide plan or intent. She denied suicide attempts.  She denied hopelessness, amotvation, or anhedonia. No objective signs or symptoms of jonny or psychosis. Patient VS are within normal range and no history of hypertension. She denied alcohol or drug abuse.     Past med trials: Wellbutrin (helped mood/motivation, but caused her to pass out, have decrease appetite, weight loss), Paxil was very hard to come off of and does not remember how much it helped. Prozac worked but was insufficiently helpful, venlafaxine (brain zaps and interfered with sleep) and vistaril (too groggy, sedating and made her feel tired that caused her to feel anxious and depressed)    Psychiatric Review Of Systems - Is patient experiencing or having changes in:  sleep: no  appetite: no  weight: no  energy/anergy: no  interest/pleasure/anhedonia: no  somatic symptoms: no  anxiety/panic: yes but manageable  guilty/hopelessness: no  concentration: yes Improved with adderall  S.I.B.s/risky behavior: no  Irritability: no  Racing thoughts: no  Impulsive behaviors: no  Paranoia:no  AVH:no  Suicidal thoughts/plan/intent: no  SE: no  ETOH: no  Drugs: no    Review of Systems " "    MEDICAL REVIEW OF SYSTEMS  History obtained from the patient  General : NO chills or fever  Eyes: NO  visual changes  ENT: NO  nasal discharge or sore throat  Endocrine: NO weight changes  Dermatological: NO rashes  Respiratory: NO cough, shortness of breath  Cardiovascular: NO chest pain, palpitations or racing heart  Gastrointestinal: NO nausea, vomiting, constipation or diarrhea  Musculoskeletal: NO muscle pain or stiffness  Neurological: NO , dizziness, headaches or tremors  Psychiatric: please see HPI        Past Medical, Family and Social History: The patient's past medical, family and social history have been reviewed and updated as appropriate within the electronic medical record - see encounter notes.  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 Has been through CBT but not recently. Med trials as above plus ambien per chart in 2015    Compliance: yes    Side effects: None    Risk Parameters:  Patient reports no suicidal ideation  Patient reports no homicidal ideation  Patient reports no self-injurious behavior  Patient reports no violent behavior    Exam (detailed: at least 9 elements; comprehensive: all 15 elements)   Constitutional  Vitals:  Most recent vital signs, dated greater than 90 days prior to this appointment, were reviewed.   Vitals:    08/15/24 1150   BP: 94/64   Pulse: 76   Resp: 18        General:  unremarkable, age appropriate     Musculoskeletal  Muscle Strength/Tone:  no dystonia, no tremor, no tic   Gait & Station:  Normal gait     Psychiatric  Speech:  no latency; no press, spontaneous,    Mood & Affect:  "Good"  Mood congruent, bright, and appropriate   Thought Process:  normal and logical, logical   Associations:  intact   Thought Content:  normal, no suicidality, no homicidality, delusions, or paranoia   Insight:  intact, has awareness of illness   Judgement: behavior is adequate to circumstances, age " appropriate   Orientation:  grossly intact   Memory: intact for content of interview, able to remember recent events- yes, able to remember remote events- yes   Language: grossly intact, able to name, able to repeat   Attention Span & Concentration:  able to focus with adderall   Fund of Knowledge:  intact and appropriate to age and level of education     Assessment and Diagnosis   Status/Progress: Based on the examination today, the patient's problem(s) is/are not well controlled.  New problems have not been presented today.   Co-morbidities, Diagnostic uncertainty and Lack of compliance are not complicating management of the primary condition.  There are no active rule-out diagnoses for this patient at this time.     General Impression: Doing well on her current medication regimen of adderall 15 mg po BID and prozac 20 mg po daily. No changes in dosages due to their effectiveness in treating her ADHD, depression, and anxiety.      ICD-10-CM ICD-9-CM   1. Attention deficit hyperactivity disorder (ADHD), predominantly inattentive type  F90.0 314.00   2. JAMEY (generalized anxiety disorder)  F41.1 300.02              Intervention/Counseling/Treatment Plan     Depression & Anxiety  Continue prozac 20 mg to help with depressive sx  Continue xanax 0.25 mg po PRN daily for severe anxiety and panic attacks (rarely takes it)  Informed pt of the risks of continuous Benzodiazepine use including tolerance, dependence, dizziness, drowsiness, memory problems, and withdrawals that may be life threatening upon abrupt cessation. Also advised not to take Benzodiazepines with Opiates or other sedatives and also not to drive or operate heavy machinery while using Benzodiazepines. Avoid alcohol with Benzodiazapine. Patient agreed to take it and verbalized understanding    Reviewed patient's most recent vitals     ADHD, inattentive type  Continue Adderall IR 15 mg PO BID. Checked  and no signs of misuse.  Reviewed patient's vitals and  encouraged new vitals ASAP and she agreed  Encouraged in-person visit and she is scheduled today for in person follow up      -Continue psychotherapy     Previously discussed risks, benefits, AE's (incl serotonin syndrome) SE's (including but not limited to GI sx, insomnia, agitation/anxiety/activation, appetite changes, HA, hypotension, others) inherent unpredictability of illness and treatment option    Discussed informed consent, diagnosis, risks and benefits of proposed treatment above vs alternative treatments vs no treatment, and potential side effects of these treatments. The patient expresses understanding of the above and displays the capacity to agree with this treatment given said understanding. Patient also agrees that, currently, the benefits outweigh the risks and would like to pursue treatment at this time. Answered all questions and discussed follow up. Encouraged patient to contact us with any questions or concerns.    Encouraged Patient to keep future appointment    Take medications as prescribed     Patient to present to Emergency Department for thoughts to harm herself or others      Return to Clinic: 3 months or sooner if needed    Gina Murphy, JOSE MANUELP-BC

## 2024-08-26 ENCOUNTER — PATIENT MESSAGE (OUTPATIENT)
Dept: OBSTETRICS AND GYNECOLOGY | Facility: CLINIC | Age: 44
End: 2024-08-26
Payer: COMMERCIAL

## 2024-08-26 ENCOUNTER — LAB VISIT (OUTPATIENT)
Dept: LAB | Facility: OTHER | Age: 44
End: 2024-08-26
Attending: NURSE PRACTITIONER
Payer: COMMERCIAL

## 2024-08-26 ENCOUNTER — OFFICE VISIT (OUTPATIENT)
Dept: OBSTETRICS AND GYNECOLOGY | Facility: CLINIC | Age: 44
End: 2024-08-26
Payer: COMMERCIAL

## 2024-08-26 VITALS — SYSTOLIC BLOOD PRESSURE: 93 MMHG | BODY MASS INDEX: 24.29 KG/M2 | WEIGHT: 128.5 LBS | DIASTOLIC BLOOD PRESSURE: 62 MMHG

## 2024-08-26 DIAGNOSIS — N93.9 ABNORMAL UTERINE BLEEDING: Primary | ICD-10-CM

## 2024-08-26 DIAGNOSIS — N88.8 NABOTHIAN CYST: ICD-10-CM

## 2024-08-26 DIAGNOSIS — N95.1 MENOPAUSAL SYMPTOM: ICD-10-CM

## 2024-08-26 DIAGNOSIS — N93.9 ABNORMAL UTERINE BLEEDING: ICD-10-CM

## 2024-08-26 LAB
BASOPHILS # BLD AUTO: 0.07 K/UL (ref 0–0.2)
BASOPHILS NFR BLD: 1.1 % (ref 0–1.9)
DIFFERENTIAL METHOD BLD: ABNORMAL
EOSINOPHIL # BLD AUTO: 0.3 K/UL (ref 0–0.5)
EOSINOPHIL NFR BLD: 4.6 % (ref 0–8)
ERYTHROCYTE [DISTWIDTH] IN BLOOD BY AUTOMATED COUNT: 12.9 % (ref 11.5–14.5)
HCT VFR BLD AUTO: 38.5 % (ref 37–48.5)
HGB BLD-MCNC: 12.3 G/DL (ref 12–16)
IMM GRANULOCYTES # BLD AUTO: 0.02 K/UL (ref 0–0.04)
IMM GRANULOCYTES NFR BLD AUTO: 0.3 % (ref 0–0.5)
LYMPHOCYTES # BLD AUTO: 1.9 K/UL (ref 1–4.8)
LYMPHOCYTES NFR BLD: 29.3 % (ref 18–48)
MCH RBC QN AUTO: 29.8 PG (ref 27–31)
MCHC RBC AUTO-ENTMCNC: 31.9 G/DL (ref 32–36)
MCV RBC AUTO: 93 FL (ref 82–98)
MONOCYTES # BLD AUTO: 0.5 K/UL (ref 0.3–1)
MONOCYTES NFR BLD: 8.1 % (ref 4–15)
NEUTROPHILS # BLD AUTO: 3.7 K/UL (ref 1.8–7.7)
NEUTROPHILS NFR BLD: 56.6 % (ref 38–73)
NRBC BLD-RTO: 0 /100 WBC
PLATELET # BLD AUTO: 234 K/UL (ref 150–450)
PMV BLD AUTO: 12 FL (ref 9.2–12.9)
RBC # BLD AUTO: 4.13 M/UL (ref 4–5.4)
T4 FREE SERPL-MCNC: 0.85 NG/DL (ref 0.71–1.51)
TSH SERPL DL<=0.005 MIU/L-ACNC: 1.41 UIU/ML (ref 0.4–4)
WBC # BLD AUTO: 6.58 K/UL (ref 3.9–12.7)

## 2024-08-26 PROCEDURE — 3074F SYST BP LT 130 MM HG: CPT | Mod: CPTII,S$GLB,, | Performed by: NURSE PRACTITIONER

## 2024-08-26 PROCEDURE — 1160F RVW MEDS BY RX/DR IN RCRD: CPT | Mod: CPTII,S$GLB,, | Performed by: NURSE PRACTITIONER

## 2024-08-26 PROCEDURE — 3078F DIAST BP <80 MM HG: CPT | Mod: CPTII,S$GLB,, | Performed by: NURSE PRACTITIONER

## 2024-08-26 PROCEDURE — 99999 PR PBB SHADOW E&M-EST. PATIENT-LVL III: CPT | Mod: PBBFAC,,, | Performed by: NURSE PRACTITIONER

## 2024-08-26 PROCEDURE — 99213 OFFICE O/P EST LOW 20 MIN: CPT | Mod: S$GLB,,, | Performed by: NURSE PRACTITIONER

## 2024-08-26 PROCEDURE — 3008F BODY MASS INDEX DOCD: CPT | Mod: CPTII,S$GLB,, | Performed by: NURSE PRACTITIONER

## 2024-08-26 PROCEDURE — 36415 COLL VENOUS BLD VENIPUNCTURE: CPT | Performed by: NURSE PRACTITIONER

## 2024-08-26 PROCEDURE — 1159F MED LIST DOCD IN RCRD: CPT | Mod: CPTII,S$GLB,, | Performed by: NURSE PRACTITIONER

## 2024-08-26 PROCEDURE — 84443 ASSAY THYROID STIM HORMONE: CPT | Performed by: NURSE PRACTITIONER

## 2024-08-26 PROCEDURE — 85025 COMPLETE CBC W/AUTO DIFF WBC: CPT | Performed by: NURSE PRACTITIONER

## 2024-08-26 PROCEDURE — 84439 ASSAY OF FREE THYROXINE: CPT | Performed by: NURSE PRACTITIONER

## 2024-08-26 NOTE — PROGRESS NOTES
Stormy Carlisle is a 44 y.o. female  presents with several complaints today.     AUB- on ocps since march. Usually takes them continuously to not have a period. Denies missing any pills in the pack recently. The last 4-6 weeks she has been having AUB, at least spotting daily sometimes like a regular period. Today the bleeding is very light, she is on her second week of pills. No new partners. Does not want std screening. Pap is current. More cramping than normal and when period does come seems to be heavier in flow than normal.    Mood changes- followed by a therapist and psychiatrist. Currently on prozac daily and has been on this for a while. Gets irritated very easily and yells. Feels bad. Some depression. History of Effexor use but had serious negative effects from stopping. Some anxiety about starting new meds due to this. Unsure how much of her mood is perimenopausal?    Mom went through menopause at age 45 then had a hysterectomy at age 46. She is open to starting HRT if it would make her feel better. Open to finishing current pill pack and then stopped in order to get her estradiol levels checked in future.    History of a nabothian cyst around 12 o'clock position. Sometimes feels discomfort with sex (not actual pain) and questions relation?    Past Medical History:   Diagnosis Date    Abnormal Pap smear 2002    Colposcopy    ADHD (attention deficit hyperactivity disorder)     Anxiety     Depression      Past Surgical History:   Procedure Laterality Date    Breast augmentation      INDUCED        Social History     Tobacco Use    Smoking status: Never    Smokeless tobacco: Never   Substance Use Topics    Alcohol use: No     Alcohol/week: 5.8 standard drinks of alcohol     Types: 7 Standard drinks or equivalent per week    Drug use: No     Family History   Problem Relation Name Age of Onset    Breast cancer Paternal Aunt      No Known Problems Mother      Valvular heart disease Father           s/p replacement    Colon cancer Paternal Grandmother  62    Ovarian cancer Neg Hx       OB History    Para Term  AB Living   4 2 2   2 2   SAB IAB Ectopic Multiple Live Births   0 1   0 2      # Outcome Date GA Lbr Caio/2nd Weight Sex Type Anes PTL Lv   4 Term 01/15/15 40w4d / 00:35 3.175 kg (7 lb) F Vag-Spont EPI N DIVINE      Birth Comments: Hep B given   High intermediate bilirubin at discharge     3 AB 08/15/13 6w0d     Gen     2 Term 11 39w0d  3.515 kg (7 lb 12 oz) F Vag-Spont EPI N DIVINE      Birth Comments: System Generated. Please review and update pregnancy details.      Complications: Abruptio Placenta   1 IAB                ROS:  GENERAL: No fever, chills, fatigability or weight loss. Mood changes  VULVAR: No pain, no lesions and no itching.  VAGINAL: No relaxation, no itching, no discharge, pos abnormal bleeding and no lesions.  ABDOMEN: No abdominal pain. Denies nausea. Denies vomiting. No diarrhea. No constipation  BREAST: Denies pain. No lumps. No discharge.  URINARY: No incontinence, no nocturia, no frequency and no dysuria.  CARDIOVASCULAR: No chest pain. No shortness of breath. No leg cramps.  NEUROLOGICAL: No headaches. No vision changes.    PHYSICAL EXAM:  VULVA: normal appearing vulva with no masses, tenderness or lesions   VAGINA: normal appearing vagina with normal color. No abnormal discharge, no lesions. No active bleeding noted  CERVIX: normal appearing cervix without discharge or lesions nabothian (?) cyst noted on cervix at 12 o'clock position. No friability, translucent. Appears to be larger than last exam, protrudes from cervix by 1 cm.  UTERUS: uterus is normal size, shape, consistency and nontender   ADNEXA: normal adnexa in size, nontender and no masses    ASSESSMENT and PLAN:    ICD-10-CM ICD-9-CM    1. Abnormal uterine bleeding  N93.9 626.9 US Pelvis Comp with Transvag NON-OB (xpd      TSH      T4, FREE      CBC Auto Differential      2. Menopausal symptom  N95.1  627.2 Ambulatory referral/consult to [facility]      TSH      T4, FREE      CBC Auto Differential      3. Nabothian cyst  N88.8 616.0             Labs today for thyroid and cbc  Continue current pill pack and will allow cycle then stop. Labs in 2-3 months  Menopause clinic referral placed.   Pelvic US  Consult with MD regarding cyst.    FOLLOW UP: PRN lack of improvement.    As of April 1, 2021, the Cures Act has been passed nationally. This new law requires that all doctors progress notes, lab results, pathology reports and radiology reports be released IMMEDIATELY to the patient in the patient portal. That means that the results are released to you at the EXACT same time they are released to me. Therefore, with all of the patients that I have I am not able to reply to each patient exactly when the results come in. So there will be a delay from when you see the results to when I see them and have time to come up with a response to send you. Also I only see these results when I am on the computer at work. So if the results come in over the weekend or after 5 pm of a work day, I will not see them until the next business day. As you can tell, this is a challenge as a provider to give every patient the quick response they hope for and deserve. So please be patient!   Thanks for your understanding and patience.

## 2024-08-27 ENCOUNTER — HOSPITAL ENCOUNTER (OUTPATIENT)
Dept: RADIOLOGY | Facility: OTHER | Age: 44
Discharge: HOME | End: 2024-08-27
Attending: NURSE PRACTITIONER
Payer: COMMERCIAL

## 2024-08-27 DIAGNOSIS — N93.9 ABNORMAL UTERINE BLEEDING: ICD-10-CM

## 2024-08-27 PROCEDURE — 76856 US EXAM PELVIC COMPLETE: CPT | Mod: 26,,, | Performed by: RADIOLOGY

## 2024-08-27 PROCEDURE — 76830 TRANSVAGINAL US NON-OB: CPT | Mod: 26,,, | Performed by: RADIOLOGY

## 2024-08-27 PROCEDURE — 76830 TRANSVAGINAL US NON-OB: CPT | Mod: TC

## 2024-09-09 ENCOUNTER — PATIENT MESSAGE (OUTPATIENT)
Dept: ENDOSCOPY | Facility: HOSPITAL | Age: 44
End: 2024-09-09

## 2024-09-09 ENCOUNTER — TELEPHONE (OUTPATIENT)
Dept: ENDOSCOPY | Facility: HOSPITAL | Age: 44
End: 2024-09-09

## 2024-09-09 ENCOUNTER — CLINICAL SUPPORT (OUTPATIENT)
Dept: ENDOSCOPY | Facility: HOSPITAL | Age: 44
End: 2024-09-09
Attending: OBSTETRICS & GYNECOLOGY
Payer: COMMERCIAL

## 2024-09-09 DIAGNOSIS — Z12.11 COLON CANCER SCREENING: ICD-10-CM

## 2024-09-09 DIAGNOSIS — Z12.11 COLON CANCER SCREENING: Primary | ICD-10-CM

## 2024-09-09 RX ORDER — SODIUM, POTASSIUM,MAG SULFATES 17.5-3.13G
SOLUTION, RECONSTITUTED, ORAL ORAL
Qty: 1 KIT | Refills: 0 | Status: SHIPPED | OUTPATIENT
Start: 2024-09-09

## 2024-09-09 NOTE — TELEPHONE ENCOUNTER
Spoke to patient to schedule procedure(s) Colonoscopy       Physician to perform procedure(s) Dr. MELLISSA Jones  Date of Procedure (s) 11/13/24  Arrival Time 7:00 AM  Time of Procedure(s) 8:00 AM   Location of Procedure(s) [facility]  Type of Rx Prep sent to patient: Suprep  Instructions provided to patient via [facility]    Patient was informed on the following information and verbalized understanding. Screening questionnaire reviewed with patient and complete. If procedure requires anesthesia, a responsible adult needs to be present to accompany the patient home, patient cannot drive after receiving anesthesia. Appointment details are tentative, especially check-in time. Patient will receive a prep-op call 7 days prior to confirm check-in time for procedure. If applicable the patient should contact their pharmacy to verify Rx for procedure prep is ready for pick-up. Patient was advised to call the scheduling department at [phone] if pharmacy states no Rx is available. Patient was advised to call the endoscopy scheduling department if any questions or concerns arise.      [facility] Scheduling Department

## 2024-09-10 ENCOUNTER — CLINICAL SUPPORT (OUTPATIENT)
Dept: OBSTETRICS AND GYNECOLOGY | Facility: CLINIC | Age: 44
End: 2024-09-10
Payer: COMMERCIAL

## 2024-09-10 DIAGNOSIS — N95.1 MENOPAUSAL SYMPTOMS: Primary | ICD-10-CM

## 2024-09-11 NOTE — PROGRESS NOTES
Ms. Carlisle is a 44 y.o. female that is seeking care to help manage menopause symptoms and lifestyle changes. Pt states that she stopped taking the birth control from instruction from her provider, to allow for baseline labs to check her hormones. She does have a family history breast and colon cancer on the paternal side.   And   How do you rate the current quality of life?   Rate: 6  Are you still menstruating?  Yes/No: Yes,  If Yes: I am increasingly irregular, or the timing/heaviness of my periods has changed. Pt states that she has been bleeding for the past 6 weeks, she states the flow is moderate to light.   What's your ethnicity?   Ethnicity:    What kind of support do you need?   Support: Products that support your personal menopause type.   Which symptoms are you currently experiencing? (Prompt lets you select all that apply and asks you questions about each symptom as shown below)  Hot flashes  Hot Flashes: No  Indicate the impact hot flashes have on your quality of life? (rate 1-7, 7 being highest)   Rate: N/A   Sleep disturbances outside of night sweats  Sleep Disturbances: N/A  Indicate the impact of the sleep disturbances have on your quality of life? (rate 1-7, 7 being highest)   Rate: N/A   Vaginal symptoms  Symptoms: N/A  Indicate the impact of vaginal symptoms have on your quality of life? (rate 1-7, 7 being highest)   Rate: N/A  Urinary symptoms  Symptoms: N/A   Indicate the impact of the urinary symptoms have on your quality of life? (rate 1-7, 7 being highest)   Rate: N/A  Changes in body weight  Body Weight: N/A   Indicate the impact of body weight changes have on your quality of life? (rate 1-7, 7 being highest)   Rate: N/A  Musculoskeletal issue, joint pain  Musculoskeletal/Joint: N/A  Indicate the impact of the musculoskeletal issues or joint pain on your quality of life? (rate 1-7, 7 being highest)  Rate: N/A  Gastrointestinal changes  Gastro: Gas/bloating & constipation  Indicate  the impact of the impact of these gastrointestinal symptoms on your quality of life? (rate 1-7, 7 being highest)  Rate: N/A  Night sweats   Night Sweats: No  Indicate the impact hot flashes have on your quality of life? (rate 1-7, 7 being highest)   Rate: N/A   Mood changes  Mood: Anxiety  Depression,and Irritability. She takes Fluoxetine 20 mg and Adderall 15 mg daily  Indicate the impact of the mood issues on your quality of life (rate 1-7, 7 being highest)   Rate: N/A  Breast discomfort  Breast Discomfort: N/A   Indicate the impact of the breast discomfort issues on your quality of life (rate 1-7, 7 being highest)   Rate: N/A  Changes in libido/sex drive  Libido: N/A  Indicate the impact of changes in sex drive on your quality of life (rate 1-7, 7 being highest)   Rate: N/A  Changes in skin, hair, or nails  Skin/Hair/Nails: Itchy skin   Indicate the impact changes of the skin, nails, or hair symptoms has on your quality of life (rate 1-7, 7 being highest)  Rate: N/A  Neurologic changes or head pain  Neuro: Brain fog or loss of focus , Fatigue , and Memory changes   Indicate the impact of neurologic changes or head pain has on your quality of life (rate 1-7, 7 being highest)  Rate: N/A

## 2024-10-30 ENCOUNTER — OFFICE VISIT (OUTPATIENT)
Dept: PSYCHIATRY | Facility: CLINIC | Age: 44
End: 2024-10-30
Payer: COMMERCIAL

## 2024-10-30 VITALS
DIASTOLIC BLOOD PRESSURE: 69 MMHG | HEART RATE: 75 BPM | BODY MASS INDEX: 23.83 KG/M2 | WEIGHT: 126.13 LBS | SYSTOLIC BLOOD PRESSURE: 103 MMHG

## 2024-10-30 DIAGNOSIS — F41.1 GAD (GENERALIZED ANXIETY DISORDER): ICD-10-CM

## 2024-10-30 DIAGNOSIS — F90.0 ATTENTION DEFICIT HYPERACTIVITY DISORDER (ADHD), PREDOMINANTLY INATTENTIVE TYPE: Primary | Chronic | ICD-10-CM

## 2024-10-30 PROCEDURE — 3078F DIAST BP <80 MM HG: CPT | Mod: CPTII,S$GLB,, | Performed by: NURSE PRACTITIONER

## 2024-10-30 PROCEDURE — 3008F BODY MASS INDEX DOCD: CPT | Mod: CPTII,S$GLB,, | Performed by: NURSE PRACTITIONER

## 2024-10-30 PROCEDURE — 99999 PR PBB SHADOW E&M-EST. PATIENT-LVL II: CPT | Mod: PBBFAC,,, | Performed by: NURSE PRACTITIONER

## 2024-10-30 PROCEDURE — G2211 COMPLEX E/M VISIT ADD ON: HCPCS | Mod: S$GLB,,, | Performed by: NURSE PRACTITIONER

## 2024-10-30 PROCEDURE — 3074F SYST BP LT 130 MM HG: CPT | Mod: CPTII,S$GLB,, | Performed by: NURSE PRACTITIONER

## 2024-10-30 PROCEDURE — 99214 OFFICE O/P EST MOD 30 MIN: CPT | Mod: S$GLB,,, | Performed by: NURSE PRACTITIONER

## 2024-10-30 RX ORDER — DEXTROAMPHETAMINE SACCHARATE, AMPHETAMINE ASPARTATE, DEXTROAMPHETAMINE SULFATE AND AMPHETAMINE SULFATE 3.75; 3.75; 3.75; 3.75 MG/1; MG/1; MG/1; MG/1
15 TABLET ORAL 2 TIMES DAILY
Qty: 60 TABLET | Refills: 0 | Status: SHIPPED | OUTPATIENT
Start: 2024-10-30 | End: 2024-11-29

## 2024-10-30 RX ORDER — DEXTROAMPHETAMINE SACCHARATE, AMPHETAMINE ASPARTATE, DEXTROAMPHETAMINE SULFATE AND AMPHETAMINE SULFATE 3.75; 3.75; 3.75; 3.75 MG/1; MG/1; MG/1; MG/1
15 TABLET ORAL 2 TIMES DAILY
Qty: 60 TABLET | Refills: 0 | Status: SHIPPED | OUTPATIENT
Start: 2024-11-30 | End: 2024-12-30

## 2024-11-06 ENCOUNTER — TELEPHONE (OUTPATIENT)
Dept: ENDOSCOPY | Facility: HOSPITAL | Age: 44
End: 2024-11-06
Payer: COMMERCIAL

## 2024-11-06 DIAGNOSIS — Z12.11 SPECIAL SCREENING FOR MALIGNANT NEOPLASMS, COLON: Primary | ICD-10-CM

## 2024-11-06 RX ORDER — SODIUM, POTASSIUM,MAG SULFATES 17.5-3.13G
1 SOLUTION, RECONSTITUTED, ORAL ORAL DAILY
Qty: 1 KIT | Refills: 0 | Status: SHIPPED | OUTPATIENT
Start: 2024-11-06 | End: 2024-11-08

## 2024-11-06 NOTE — TELEPHONE ENCOUNTER
Spoke to patient for pre-call to confirm scheduled Colonoscopy and patient verbalized understanding of the following:       Date of Procedure (s)  verified 11/13/24  Arrival Time 7:15 AM verified.  [facility] of [facility] verified.  NPO status reinforced. Ok to continue clear liquids up until 2 hours prior to the Endoscopy procedure.   Patient denies use of blood thinners, GLP-1 medications, and weight loss medications.  Pt confirmed receipt of prep instructions and Rx prep (if applicable).  Instructions provided to patient via [facility]  Pt confirmed ride home after procedure if procedure requires anesthesia.   Pre-call screening questionnaire reviewed and completed with patient.   Appointment details are tentative, including check-in time.  If the patient begins taking any blood thinning medications, injectable weight loss/diabetes medications (other than insulin), or Adipex (phentermine) patient was instructed to contact the endoscopy scheduling department as soon as possible.  Patient was advised to call the endoscopy scheduling department if any questions or concerns arise.       [facility] Scheduling Department

## 2024-11-08 ENCOUNTER — ANESTHESIA EVENT (OUTPATIENT)
Dept: ENDOSCOPY | Facility: HOSPITAL | Age: 44
End: 2024-11-08
Payer: COMMERCIAL

## 2024-11-08 NOTE — ANESTHESIA PREPROCEDURE EVALUATION
11/08/2024  Stormy Carlisle is a 44 y.o., female.  [facility]  Anesthesia Pre-Operative Evaluation       Patient Name: Stormy Carlisle  YOB: 1980  MRN: [id number]  [facility]: [id number]      Code Status: Prior   Date of Procedure: 11/13/2024  Anesthesia: Choice Procedure: Procedure(s) (LRB):  COLONOSCOPY (N/A)  Pre-Operative Diagnosis: Colon cancer screening [Z12.11]  Proceduralist: Surgeons and Role:     * Andre Jones MD - Primary Nurse: (Unknown)      SUBJECTIVE:   Stormy Carlisle is a 44 y.o. female who  has a past medical history of Abnormal Pap smear (2002), ADHD (attention deficit hyperactivity disorder), Anxiety, and Depression..     she has a current medication list which includes the following long-term medication(s): alprazolam, clobetasol 0.05%, dextroamphetamine-amphetamine, [START ON 11/30/2024] dextroamphetamine-amphetamine, fluoxetine, and norgestimate-ethinyl estradiol.     ALLERGIES:     Review of patient's allergies indicates:   Allergen Reactions    Neoporacin [neomycin-bacitracnzn-polymyxnb] Rash     LDA:          Lines/Drains/Airways       Airway  Duration                  Airway - Non-Surgical 08/15/13 LMA 4102 days                   Anesthesia Evaluation      Airway   Dental      Pulmonary    Cardiovascular     Neuro/Psych  Psychiatric history: ADHD.    GI/Hepatic/Renal      Endo/Other    Abdominal                     MEDICATIONS:     Antibiotics (From admission, onward)      None          VTE Risk Mitigation (From admission, onward)      None              No current facility-administered medications for this encounter.     Current Outpatient Medications   Medication Sig Dispense Refill    ALPRAZolam (XANAX) 0.25 MG tablet Take 1 tablet (0.25 mg total) by mouth daily as needed for Anxiety. 15 tablet 0    clobetasol 0.05% (TEMOVATE) 0.05 % Oint Apply topically 2  (two) times daily. 45 g 0    dextroamphetamine-amphetamine (ADDERALL) 15 mg tablet Take 1 tablet (15 mg total) by mouth 2 (two) times a day. 60 tablet 0    [START ON 2024] dextroamphetamine-amphetamine (ADDERALL) 15 mg tablet Take 1 tablet (15 mg total) by mouth 2 (two) times a day. 60 tablet 0    FLUoxetine 20 MG capsule Take 1 capsule (20 mg total) by mouth once daily. 90 capsule 1    norgestimate-ethinyl estradioL (TRI-LO-TERRELL) 0.18/0.215/0.25 mg-25 mcg tablet Take 1 tablet by mouth once daily. 84 tablet 4    sodium,potassium,mag sulfates (SUPREP BOWEL PREP KIT) 17.5-3.13-1.6 gram SolR Follow instructions given by Endoscopy scheduling nurse 1 kit 0    sodium,potassium,mag sulfates (SUPREP BOWEL PREP KIT) 17.5-3.13-1.6 gram SolR Take 177 mLs by mouth once daily. for 2 days 1 kit 0          History:   There are no hospital problems to display for this patient.    Surgical History:    has a past surgical history that includes Breast augmentation and Induced  ().   Social History:    reports being sexually active and has had partner(s) who are male. She reports using the following method of birth control/protection: OCP.  reports that she has never smoked. She has never used smokeless tobacco. She reports that she does not drink alcohol and does not use drugs.     OBJECTIVE:     Vital Signs (Most Recent):    Vital Signs Range (Last 24H):          There is no height or weight on file to calculate BMI.   Wt Readings from Last 4 Encounters:   24 58.3 kg (128 lb 8.5 oz)   24 57.7 kg (127 lb 3.3 oz)   22 55.8 kg (123 lb)   21 56.2 kg (123 lb 14.4 oz)       Significant Labs:  Lab Results   Component Value Date    WBC 6.58 2024    HGB 12.3 2024    HCT 38.5 2024     2024     2021    K 3.9 2021     2021    CREATININE 0.8 2021    BUN 8 2021    CO2 25 2021    GLU 87 2021    CALCIUM 9.4 2021     "ALKPHOS 64 08/19/2021    ALT 11 08/19/2021    AST 20 08/19/2021    ALBUMIN 4.4 08/19/2021    INR 0.9 08/19/2011    APTT 25.2 08/19/2011    HGBA1C 4.9 08/19/2021    HCG See Comments 04/11/2011    HCGQUANT 11,886.8 (H) 07/11/2013     No LMP recorded.  No results found for this or any previous visit (from the past 72 hours).    EKG:   No results found for this or any previous visit.    TTE:  No results found for this or any previous visit.  No results found for: "EF"   No results found for this or any previous visit.  SAHRA:  No results found for this or any previous visit.  Stress Test:  No results found for this or any previous visit.     LHC:  No results found for this or any previous visit.     PFT:  No results found for: "FEV1", "FVC", "XVR7AEN", "TLC", "DLCO"     ASSESSMENT/PLAN:        Pre-op Assessment    I have reviewed the Patient Summary Reports.     I have reviewed the Nursing Notes.    I have reviewed the Medications.     Review of Systems  Anesthesia Hx:  No problems with previous Anesthesia             Denies Family Hx of Anesthesia complications.    Denies Personal Hx of Anesthesia complications.                    Hematology/Oncology:  Hematology Normal   Oncology Normal                                   EENT/Dental:  EENT/Dental Normal           Cardiovascular:  Cardiovascular Normal                                              Pulmonary:  Pulmonary Normal                       Renal/:  Renal/ Normal                 Hepatic/GI:  Hepatic/GI Normal                    Musculoskeletal:  Musculoskeletal Normal                Neurological:  Neurology Normal                                      Endocrine:  Endocrine Normal            Dermatological:  Skin Normal    Psych:  Psychiatric history: ADHD. anxiety (JAMEY) depression                   Anesthesia Plan  Type of Anesthesia, risks & benefits discussed:    Anesthesia Type: Gen Natural Airway  Intra-op Monitoring Plan: Standard ASA Monitors  Post Op Pain " Control Plan: multimodal analgesia  Induction:  IV  Informed Consent: Informed consent signed with the Patient and all parties understand the risks and agree with anesthesia plan.  All questions answered. Patient consented to blood products? No  ASA Score: 2  Day of Surgery Review of History & Physical: H&P Update referred to the surgeon/provider.    Ready For Surgery From Anesthesia Perspective.     .

## 2024-11-13 ENCOUNTER — HOSPITAL ENCOUNTER (OUTPATIENT)
Facility: HOSPITAL | Age: 44
Discharge: HOME | End: 2024-11-13
Attending: INTERNAL MEDICINE | Admitting: INTERNAL MEDICINE
Payer: COMMERCIAL

## 2024-11-13 ENCOUNTER — ANESTHESIA (OUTPATIENT)
Dept: ENDOSCOPY | Facility: HOSPITAL | Age: 44
End: 2024-11-13
Payer: COMMERCIAL

## 2024-11-13 VITALS
BODY MASS INDEX: 23 KG/M2 | DIASTOLIC BLOOD PRESSURE: 70 MMHG | HEIGHT: 62 IN | HEART RATE: 102 BPM | SYSTOLIC BLOOD PRESSURE: 96 MMHG | RESPIRATION RATE: 20 BRPM | OXYGEN SATURATION: 100 % | TEMPERATURE: 98 F | WEIGHT: 125 LBS

## 2024-11-13 DIAGNOSIS — Z12.11 COLON CANCER SCREENING: Primary | ICD-10-CM

## 2024-11-13 DIAGNOSIS — Z12.11 SCREEN FOR COLON CANCER: ICD-10-CM

## 2024-11-13 LAB
B-HCG UR QL: NEGATIVE
CTP QC/QA: YES

## 2024-11-13 PROCEDURE — 99900035 HC TECH TIME PER 15 MIN (STAT)

## 2024-11-13 PROCEDURE — 63600175 PHARM REV CODE 636 W HCPCS: Performed by: NURSE ANESTHETIST, CERTIFIED REGISTERED

## 2024-11-13 PROCEDURE — 81025 URINE PREGNANCY TEST: CPT | Performed by: INTERNAL MEDICINE

## 2024-11-13 PROCEDURE — 37000009 HC ANESTHESIA EA ADD 15 MINS: Performed by: INTERNAL MEDICINE

## 2024-11-13 PROCEDURE — 25000003 PHARM REV CODE 250: Performed by: NURSE ANESTHETIST, CERTIFIED REGISTERED

## 2024-11-13 PROCEDURE — 94761 N-INVAS EAR/PLS OXIMETRY MLT: CPT

## 2024-11-13 PROCEDURE — 45385 COLONOSCOPY W/LESION REMOVAL: CPT | Mod: 33 | Performed by: INTERNAL MEDICINE

## 2024-11-13 PROCEDURE — 45385 COLONOSCOPY W/LESION REMOVAL: CPT | Mod: 33,,, | Performed by: INTERNAL MEDICINE

## 2024-11-13 PROCEDURE — 88305 TISSUE EXAM BY PATHOLOGIST: CPT | Performed by: STUDENT IN AN ORGANIZED HEALTH CARE EDUCATION/TRAINING PROGRAM

## 2024-11-13 PROCEDURE — 88305 TISSUE EXAM BY PATHOLOGIST: CPT | Mod: 26,,, | Performed by: STUDENT IN AN ORGANIZED HEALTH CARE EDUCATION/TRAINING PROGRAM

## 2024-11-13 PROCEDURE — A4216 STERILE WATER/SALINE, 10 ML: HCPCS | Performed by: NURSE ANESTHETIST, CERTIFIED REGISTERED

## 2024-11-13 PROCEDURE — 27201089 HC SNARE, DISP (ANY): Performed by: INTERNAL MEDICINE

## 2024-11-13 PROCEDURE — 37000008 HC ANESTHESIA 1ST 15 MINUTES: Performed by: INTERNAL MEDICINE

## 2024-11-13 RX ORDER — PROPOFOL 10 MG/ML
VIAL (ML) INTRAVENOUS CONTINUOUS PRN
Status: DISCONTINUED | OUTPATIENT
Start: 2024-11-13 | End: 2024-11-13

## 2024-11-13 RX ORDER — SODIUM CHLORIDE 0.9 % (FLUSH) 0.9 %
SYRINGE (ML) INJECTION
Status: DISCONTINUED | OUTPATIENT
Start: 2024-11-13 | End: 2024-11-13

## 2024-11-13 RX ORDER — LIDOCAINE HYDROCHLORIDE 20 MG/ML
INJECTION INTRAVENOUS
Status: DISCONTINUED | OUTPATIENT
Start: 2024-11-13 | End: 2024-11-13

## 2024-11-13 RX ORDER — PROPOFOL 10 MG/ML
VIAL (ML) INTRAVENOUS
Status: DISCONTINUED | OUTPATIENT
Start: 2024-11-13 | End: 2024-11-13

## 2024-11-13 RX ORDER — SODIUM CHLORIDE 9 MG/ML
INJECTION, SOLUTION INTRAVENOUS CONTINUOUS
Status: DISCONTINUED | OUTPATIENT
Start: 2024-11-13 | End: 2024-11-13 | Stop reason: HOSPADM

## 2024-11-13 RX ADMIN — Medication 10 ML: at 08:11

## 2024-11-13 RX ADMIN — PROPOFOL 150 MCG/KG/MIN: 10 INJECTION, EMULSION INTRAVENOUS at 08:11

## 2024-11-13 RX ADMIN — PROPOFOL 100 MG: 10 INJECTION, EMULSION INTRAVENOUS at 08:11

## 2024-11-13 RX ADMIN — LIDOCAINE HYDROCHLORIDE 50 MG: 20 INJECTION INTRAVENOUS at 08:11

## 2024-11-13 NOTE — TRANSFER OF CARE
"Anesthesia Transfer of Care Note    Patient: Stormy Carlisle    Procedure(s) Performed: Procedure(s) (LRB):  COLONOSCOPY (N/A)    Patient location: PACU    Anesthesia Type: general    Transport from OR: Transported from OR on 6-10 L/min O2 by face mask with adequate spontaneous ventilation    Post pain: adequate analgesia    Post assessment: no apparent anesthetic complications    Post vital signs: stable    Level of consciousness: sedated    Nausea/Vomiting: no nausea/vomiting    Complications: none    Transfer of care protocol was followed      Last vitals: Visit Vitals  /63   Pulse 75   Temp 36.6 °C (97.9 °F) (Temporal)   Resp 19   Ht 5' 2" (1.575 m)   Wt 56.7 kg (125 lb)   LMP 11/10/2024   SpO2 98%   Breastfeeding No   BMI 22.86 kg/m²     "

## 2024-11-13 NOTE — PLAN OF CARE
Pt Resting in bed no s/s of distress RR even and unlabored VSS. Discharge instructions given and pt verbalized. IV D/C. Pt ready for discharge home with family reports given to patient. Pt tolerated po. Denies N/V. Denies pain. Pt ambu with steady gait to bathrrom.

## 2024-11-13 NOTE — H&P
Short Stay Endoscopy History and Physical    PCP - No, Primary Doctor    Procedure - Colonoscopy  Sedation: GA  ASA - per anesthesia  Mallampati - per anesthesia  History of Anesthesia problems - no  Family history Anesthesia problems -  no     HPI:  This is a 44 y.o. female here for evaluation of : Screening for CRC    Reflux - no  Dysphagia - no  Abdominal pain - no  Diarrhea - no    ROS:  Constitutional: No fevers, chills, No weight loss  ENT: No allergies  CV: No chest pain  Pulm: No cough, No shortness of breath  Ophtho: No vision changes  GI: see HPI  Medical History:  has a past medical history of Abnormal Pap smear (), ADHD (attention deficit hyperactivity disorder), Anxiety, and Depression.    Surgical History:  has a past surgical history that includes Breast augmentation and Induced  ().    Family History: family history includes Breast cancer in her paternal aunt; Colon cancer (age of onset: 62) in her paternal grandmother; No Known Problems in her mother; Valvular heart disease in her father.. Otherwise no colon cancer, inflammatory bowel disease, or GI malignancies.    Social History:  reports that she has never smoked. She has never used smokeless tobacco. She reports that she does not drink alcohol and does not use drugs.    Review of patient's allergies indicates:   Allergen Reactions    Neoporacin [neomycin-bacitracnzn-polymyxnb] Rash       Medications:   Medications Prior to Admission   Medication Sig Dispense Refill Last Dose/Taking    ALPRAZolam (XANAX) 0.25 MG tablet Take 1 tablet (0.25 mg total) by mouth daily as needed for Anxiety. 15 tablet 0     clobetasol 0.05% (TEMOVATE) 0.05 % Oint Apply topically 2 (two) times daily. 45 g 0     dextroamphetamine-amphetamine (ADDERALL) 15 mg tablet Take 1 tablet (15 mg total) by mouth 2 (two) times a day. 60 tablet 0 2024    [START ON 2024] dextroamphetamine-amphetamine (ADDERALL) 15 mg tablet Take 1 tablet (15 mg total) by  mouth 2 (two) times a day. 60 tablet 0 11/12/2024    FLUoxetine 20 MG capsule Take 1 capsule (20 mg total) by mouth once daily. 90 capsule 1 11/12/2024    norgestimate-ethinyl estradioL (TRI-LO-TERRELL) 0.18/0.215/0.25 mg-25 mcg tablet Take 1 tablet by mouth once daily. 84 tablet 4     sodium,potassium,mag sulfates (SUPREP BOWEL PREP KIT) 17.5-3.13-1.6 gram SolR Follow instructions given by Endoscopy scheduling nurse 1 kit 0 Past Week       Objective Findings:    Vital Signs: Per nursing notes.    Physical Exam:  General Appearance: Well appearing in no acute distress  Head:   Normocephalic, without obvious abnormality  Eyes:    No scleral icterus  Airway: Open  Neck: No restriction in mobility  Lungs: CTA bilaterally in anterior and posterior fields, no wheezes, no crackles.  Heart:  Regular rate and rhythm, S1, S2 normal, no murmurs heard  Abdomen: Soft, non tender, non distended      Labs:  Lab Results   Component Value Date    WBC 6.58 08/26/2024    HGB 12.3 08/26/2024    HCT 38.5 08/26/2024     08/26/2024    ALT 11 08/19/2021    AST 20 08/19/2021     08/19/2021    K 3.9 08/19/2021     08/19/2021    CREATININE 0.8 08/19/2021    BUN 8 08/19/2021    CO2 25 08/19/2021    TSH 1.409 08/26/2024    INR 0.9 08/19/2011    HGBA1C 4.9 08/19/2021         I have explained the risks and benefits of endoscopy procedures to the patient including but not limited to bleeding, perforation, infection, and death.    Thank you so much for allowing me to participate in the care of Stormy Jones MD

## 2024-11-13 NOTE — ANESTHESIA POSTPROCEDURE EVALUATION
Anesthesia Post Evaluation    Patient: Stormy Carlisle    Procedure(s) Performed: Procedure(s) (LRB):  COLONOSCOPY (N/A)    Final Anesthesia Type: general      Patient location during evaluation: PACU  Patient participation: Yes- Able to Participate  Level of consciousness: awake and alert  Post-procedure vital signs: reviewed and stable  Pain management: adequate  Airway patency: patent    PONV status at discharge: No PONV  Anesthetic complications: no      Cardiovascular status: blood pressure returned to baseline  Respiratory status: unassisted  Hydration status: euvolemic  Follow-up not needed.          Vitals Value Taken Time   BP 93/64 11/13/24 0917   Temp 36.5 °C (97.7 °F) 11/13/24 0900   Pulse 102 11/13/24 0921   Resp 20 11/13/24 0921   SpO2 100 % 11/13/24 0921   Vitals shown include unfiled device data.      Event Time   Out of Recovery 09:17:00         Pain/Nick Score: Nick Score: 10 (11/13/2024  9:17 AM)

## 2024-11-13 NOTE — PROVATION PATIENT INSTRUCTIONS
Discharge Summary/Instructions after an Endoscopic Procedure  Patient Name: Stormy Carlisle  Patient MRN: [id number]  Patient YOB: 1980  Wednesday, November 13, 2024  Andre Jones MD  Dear patient,  As a result of recent federal legislation (The Federal Cures Act), you may   receive lab or pathology results from your procedure in your [facility]   account before your physician is able to contact you. Your physician or   their representative will relay the results to you with their   recommendations at their soonest availability.  Thank you,  RESTRICTIONS:  During your procedure today, you received medications for sedation.  These   medications may affect your judgment, balance and coordination.  Therefore,   for 24 hours, you have the following restrictions:   - DO NOT drive a car, operate machinery, make legal/financial decisions,   sign important papers or drink alcohol.    ACTIVITY:  Today: no heavy lifting, straining or running due to procedural   sedation/anesthesia.  The following day: return to full activity including work.  DIET:  Eat and drink normally unless instructed otherwise.     TREATMENT FOR COMMON SIDE EFFECTS:  - Mild abdominal pain, nausea, belching, bloating or excessive gas:  rest,   eat lightly and use a heating pad.  - Sore Throat: treat with throat lozenges and/or gargle with warm salt   water.  - Because air was used during the procedure, expelling large amounts of air   from your rectum or belching is normal.  - If a bowel prep was taken, you may not have a bowel movement for 1-3 days.    This is normal.  SYMPTOMS TO WATCH FOR AND REPORT TO YOUR PHYSICIAN:  1. Abdominal pain or bloating, other than gas cramps.  2. Chest pain.  3. Back pain.  4. Signs of infection such as: chills or fever occurring within 24 hours   after the procedure.  5. Rectal bleeding, which would show as bright red, maroon, or black stools.   (A tablespoon of blood from the rectum is not serious, especially  if   hemorrhoids are present.)  6. Vomiting.  7. Weakness or dizziness.  GO DIRECTLY TO THE NEAREST EMERGENCY ROOM IF YOU HAVE ANY OF THE FOLLOWING:      Difficulty breathing              Chills and/or fever over 101 F   Persistent vomiting and/or vomiting blood   Severe abdominal pain   Severe chest pain   Black, tarry stools   Bleeding- more than one tablespoon   Any other symptom or condition that you feel may need urgent attention  Your doctor recommends these additional instructions:  If any biopsies were taken, your doctors clinic will contact you in 1 to 2   weeks with any results.  - Patient has a contact number available for emergencies.  The signs and   symptoms of potential delayed complications were discussed with the   patient.  Return to normal activities tomorrow.  Written discharge   instructions were provided to the patient.   - Discharge patient to home.   - Resume previous diet.   - Continue present medications.   - Await pathology results.   - Repeat colonoscopy in 10 years for surveillance.   For questions, problems or results please call your physician - Andre Jones MD at Work:  [phone].  [facility], EMERGENCY ROOM PHONE NUMBER: [phone]  IF A COMPLICATION OR EMERGENCY SITUATION ARISES AND YOU ARE UNABLE TO REACH   YOUR PHYSICIAN - GO DIRECTLY TO THE EMERGENCY ROOM.  Andre Jones MD  11/13/2024 8:58:25 AM  This report has been verified and signed electronically.  Dear patient,  As a result of recent federal legislation (The Federal Cures Act), you may   receive lab or pathology results from your procedure in your [facility]   account before your physician is able to contact you. Your physician or   their representative will relay the results to you with their   recommendations at their soonest availability.  Thank you,  PROVATION

## 2024-11-13 NOTE — PLAN OF CARE
Pt in preop bay 4, VSS, meds given and IV inserted. Pt denies any open wounds on body or the use of any weight loss injections. Pt needs procedural consents, otherwise ready to roll.

## 2024-11-16 LAB
FINAL PATHOLOGIC DIAGNOSIS: NORMAL
GROSS: NORMAL
Lab: NORMAL

## 2024-11-20 ENCOUNTER — TELEPHONE (OUTPATIENT)
Dept: GASTROENTEROLOGY | Facility: CLINIC | Age: 44
End: 2024-11-20
Payer: COMMERCIAL

## 2024-11-20 ENCOUNTER — PATIENT MESSAGE (OUTPATIENT)
Dept: GASTROENTEROLOGY | Facility: CLINIC | Age: 44
End: 2024-11-20
Payer: COMMERCIAL

## 2024-11-20 NOTE — TELEPHONE ENCOUNTER
----- Message from Andre Jones MD sent at 11/20/2024  9:44 AM CST -----  Please call and notify patient, the colon polyp was benign.

## 2024-11-27 ENCOUNTER — PATIENT MESSAGE (OUTPATIENT)
Dept: PSYCHIATRY | Facility: CLINIC | Age: 44
End: 2024-11-27
Payer: COMMERCIAL

## 2024-11-27 DIAGNOSIS — F90.0 ATTENTION DEFICIT HYPERACTIVITY DISORDER (ADHD), PREDOMINANTLY INATTENTIVE TYPE: Chronic | ICD-10-CM

## 2024-11-27 RX ORDER — DEXTROAMPHETAMINE SACCHARATE, AMPHETAMINE ASPARTATE, DEXTROAMPHETAMINE SULFATE AND AMPHETAMINE SULFATE 3.75; 3.75; 3.75; 3.75 MG/1; MG/1; MG/1; MG/1
15 TABLET ORAL 2 TIMES DAILY
Qty: 60 TABLET | Refills: 0 | Status: SHIPPED | OUTPATIENT
Start: 2024-11-27 | End: 2024-12-27

## 2024-12-02 DIAGNOSIS — F90.0 ATTENTION DEFICIT HYPERACTIVITY DISORDER (ADHD), PREDOMINANTLY INATTENTIVE TYPE: Chronic | ICD-10-CM

## 2024-12-02 RX ORDER — DEXTROAMPHETAMINE SACCHARATE, AMPHETAMINE ASPARTATE, DEXTROAMPHETAMINE SULFATE AND AMPHETAMINE SULFATE 3.75; 3.75; 3.75; 3.75 MG/1; MG/1; MG/1; MG/1
15 TABLET ORAL 2 TIMES DAILY
Qty: 60 TABLET | Refills: 0 | Status: SHIPPED | OUTPATIENT
Start: 2024-12-02 | End: 2025-01-01

## 2025-01-05 ENCOUNTER — PATIENT MESSAGE (OUTPATIENT)
Dept: PSYCHIATRY | Facility: CLINIC | Age: 45
End: 2025-01-05
Payer: COMMERCIAL

## 2025-01-05 DIAGNOSIS — F90.0 ATTENTION DEFICIT HYPERACTIVITY DISORDER (ADHD), PREDOMINANTLY INATTENTIVE TYPE: Chronic | ICD-10-CM

## 2025-01-06 RX ORDER — DEXTROAMPHETAMINE SACCHARATE, AMPHETAMINE ASPARTATE, DEXTROAMPHETAMINE SULFATE AND AMPHETAMINE SULFATE 3.75; 3.75; 3.75; 3.75 MG/1; MG/1; MG/1; MG/1
15 TABLET ORAL 2 TIMES DAILY
Qty: 60 TABLET | Refills: 0 | Status: SHIPPED | OUTPATIENT
Start: 2025-01-06 | End: 2025-01-07 | Stop reason: SDUPTHER

## 2025-01-07 RX ORDER — DEXTROAMPHETAMINE SACCHARATE, AMPHETAMINE ASPARTATE, DEXTROAMPHETAMINE SULFATE AND AMPHETAMINE SULFATE 3.75; 3.75; 3.75; 3.75 MG/1; MG/1; MG/1; MG/1
15 TABLET ORAL 2 TIMES DAILY
Qty: 60 TABLET | Refills: 0 | Status: SHIPPED | OUTPATIENT
Start: 2025-01-07 | End: 2025-01-08 | Stop reason: SDUPTHER

## 2025-01-08 RX ORDER — DEXTROAMPHETAMINE SACCHARATE, AMPHETAMINE ASPARTATE, DEXTROAMPHETAMINE SULFATE AND AMPHETAMINE SULFATE 3.75; 3.75; 3.75; 3.75 MG/1; MG/1; MG/1; MG/1
15 TABLET ORAL 2 TIMES DAILY
Qty: 60 TABLET | Refills: 0 | Status: SHIPPED | OUTPATIENT
Start: 2025-01-08 | End: 2025-01-09 | Stop reason: SDUPTHER

## 2025-01-09 ENCOUNTER — OFFICE VISIT (OUTPATIENT)
Dept: PSYCHIATRY | Facility: CLINIC | Age: 45
End: 2025-01-09
Payer: COMMERCIAL

## 2025-01-09 DIAGNOSIS — F90.0 ATTENTION DEFICIT HYPERACTIVITY DISORDER (ADHD), PREDOMINANTLY INATTENTIVE TYPE: Primary | Chronic | ICD-10-CM

## 2025-01-09 DIAGNOSIS — F41.1 GAD (GENERALIZED ANXIETY DISORDER): ICD-10-CM

## 2025-01-09 RX ORDER — DEXTROAMPHETAMINE SACCHARATE, AMPHETAMINE ASPARTATE, DEXTROAMPHETAMINE SULFATE AND AMPHETAMINE SULFATE 3.75; 3.75; 3.75; 3.75 MG/1; MG/1; MG/1; MG/1
15 TABLET ORAL 2 TIMES DAILY
Qty: 30 TABLET | Refills: 0 | Status: SHIPPED | OUTPATIENT
Start: 2025-03-20 | End: 2025-04-19

## 2025-01-09 RX ORDER — DEXTROAMPHETAMINE SACCHARATE, AMPHETAMINE ASPARTATE, DEXTROAMPHETAMINE SULFATE AND AMPHETAMINE SULFATE 3.75; 3.75; 3.75; 3.75 MG/1; MG/1; MG/1; MG/1
15 TABLET ORAL 2 TIMES DAILY
Qty: 60 TABLET | Refills: 0 | Status: SHIPPED | OUTPATIENT
Start: 2025-02-20 | End: 2025-03-22

## 2025-01-09 NOTE — PROGRESS NOTES
Outpatient Psychiatry Follow-Up Visit (MD/NP)    1/9/2025      The patient location is:  [place]    The chief complaint leading to consultation is: inattention, depression and anxiety    Visit type: audiovisual    Face to Face time with patient:  16 minutes  20 minutes of total time spent on the encounter, which includes face to face time and non-face to face time preparing to see the patient (eg, review of tests), Obtaining and/or reviewing separately obtained history, Documenting clinical information in the electronic or other health record, Independently interpreting results (not separately reported) and communicating results to the patient/family/caregiver, or Care coordination (not separately reported).         Each patient to whom he or she provides medical services by telemedicine is:  (1) informed of the relationship between the physician and patient and the respective role of any other health care provider with respect to management of the patient; and (2) notified that he or she may decline to receive medical services by telemedicine and may withdraw from such care at any time.    Notes:         Clinical Status of Patient:  Outpatient (Ambulatory)    Chief Complaint:  Stormy Carlisle is a 44 y.o. female who presents today for follow-up of depression, anxiety and attention problems. She is taking  20 mg Prozac & adderall 15 mg BID for ADHD.    Current Psychiatric Medications:  Prozac 20 mg daily for mood  Adderall 15 mg PO BID  Xanax 0.25 mg po RPN (rarely takes for severe anxiety)    Interval History and Content of Current Session:    Patient arrived for follow up today. She stated that she has been doing well since her last visit. She reported her children are doing well too. She stated her youngest daughter moved to a new school and likes it. She stated she continues to work on her art and enjoys it. She stated she went [place] soon for the project and it was a successful project. She continues to report  "well managed anxiety with prozac 20 mg po daily and her effective coping strategies. She denied feeling depressed and when she feels stressed she continues to utilize healthy coping skills like swimming, gardening, art, and spending time with her kids. She described her mood as "good" and her affect was appropriate and mood congruent.    She continues to report adderall 15 mg po BID is still very helpful in increasing her focus, attention, concentration, and productivity. She continues to report good sleep and appetite. She continues to report managed depression and anxiety with Prozac 20 mg po daily and coping strategies. She denied SE and AE including jitteriness, CP, or palpitations. She stated she takes xanax occasionally and only when she feels severely anxious or panicky.    She denied SI/HI/AVH. She denied suicidal ideation and no suicide plan or intent. She denied previous suicide attempts. She denied access to guns. She denied hopelessness, amotvation, or anhedonia. No objective signs or symptoms of jonny or psychosis. Patient VS are within normal range and no history of hypertension. She denied alcohol or drug abuse.     Past med trials: Wellbutrin (helped mood/motivation, but caused her to pass out, have decrease appetite, weight loss), Paxil was very hard to come off of and does not remember how much it helped. Prozac worked but was insufficiently helpful, venlafaxine (brain zaps and interfered with sleep) and vistaril (too groggy, sedating and made her feel tired that caused her to feel anxious and depressed)    Psychiatric Review Of Systems - Is patient experiencing or having changes in:  sleep: no  appetite: no  weight: no  energy/anergy: no  interest/pleasure/anhedonia: no  somatic symptoms: no  anxiety/panic: yes but manageable  guilty/hopelessness: no  concentration: yes Improved with adderall 15 mg po BID  S.I.B.s/risky behavior: no  Irritability: no  Racing thoughts: no  Impulsive behaviors: " "no  Paranoia:no  AVH:no  Suicidal thoughts/plan/intent: no  SE: no  ETOH: no  Drugs: no    Review of Systems     MEDICAL REVIEW OF SYSTEMS  History obtained from the patient  General : NO chills or fever  Eyes: NO  visual changes  ENT: NO  nasal discharge or sore throat  Endocrine: NO weight changes  Dermatological: NO rashes  Respiratory: NO cough, shortness of breath  Cardiovascular: NO chest pain, palpitations or racing heart  Gastrointestinal: NO nausea, vomiting, constipation or diarrhea  Musculoskeletal: NO muscle pain or stiffness  Neurological: NO , dizziness, headaches or tremors  Psychiatric: please see HPI        Past Medical, Family and Social History: The patient's past medical, family and social history have been reviewed and updated as appropriate within the electronic medical record - see encounter notes.  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 Has been through CBT but not recently. Med trials as above plus ambien per chart in 2015    Compliance: yes    Side effects: None    Risk Parameters:  Patient reports no suicidal ideation  Patient reports no homicidal ideation  Patient reports no self-injurious behavior  Patient reports no violent behavior    Exam (detailed: at least 9 elements; comprehensive: all 15 elements)   Constitutional  Vitals:  Most recent vital signs, dated greater than 90 days prior to this appointment, were reviewed.   There were no vitals filed for this visit.       General:  unremarkable, age appropriate     Musculoskeletal  Muscle Strength/Tone:  no dystonia, no tremor, no tic   Gait & Station:  Normal gait     Psychiatric  Speech:  no latency; no press, spontaneous,    Mood & Affect:  "Good"  Mood congruent and appropriate and with full range   Thought Process:  normal and logical, logical   Associations:  intact   Thought Content:  normal, no suicidality, no homicidality, delusions, or paranoia "   Insight:  intact, has awareness of illness   Judgement: behavior is adequate to circumstances, age appropriate   Orientation:  grossly intact   Memory: intact for content of interview, able to remember recent events- yes, able to remember remote events- yes   Language: grossly intact, able to name, able to repeat   Attention Span & Concentration:  able to focus with adderall   Fund of Knowledge:  intact and appropriate to age and level of education     Assessment and Diagnosis   Status/Progress: Based on the examination today, the patient's problem(s) is/are not well controlled.  New problems have not been presented today.   Co-morbidities, Diagnostic uncertainty and Lack of compliance are not complicating management of the primary condition.  There are no active rule-out diagnoses for this patient at this time.     General Impression: Doing well on her current medication regimen of adderall 15 mg po BID and prozac 20 mg po daily. No changes in dosages due to their effectiveness in treating her ADHD, depression, and anxiety.      ICD-10-CM ICD-9-CM   1. Attention deficit hyperactivity disorder (ADHD), predominantly inattentive type  F90.0 314.00   2. JAMEY (generalized anxiety disorder)  F41.1 300.02              Intervention/Counseling/Treatment Plan   In person visit 10/30/2024  Depression & Anxiety  Continue prozac 20 mg to help with depressive sx  Continue xanax 0.25 mg po PRN daily for severe anxiety and panic attacks (rarely takes it)  Informed pt of the risks of continuous Benzodiazepine use including tolerance, dependence, dizziness, drowsiness, memory problems, and withdrawals that may be life threatening upon abrupt cessation. Also advised not to take Benzodiazepines with Opiates or other sedatives and also not to drive or operate heavy machinery while using Benzodiazepines. Avoid alcohol with Benzodiazapine. Patient agreed to take it and verbalized understanding  -We discussed risk of Serotonin Syndrome  including symptoms in order of appearance: diarrhea, restlessness, extreme agitation, autonomic instability, hyperthermia, rigidity, coma, and death.  -Discussed black box warning of increased Suicidal thoughts in individuals younger than 24 years old and the steps to take if patient ever experiences suicide ideation (contact family, suicide hotline 988, call 911 or go the nearest emergency room       Reviewed patient's most recent vitals     ADHD, inattentive type  Continue Adderall IR 15 mg PO BID. Checked  and no signs of misuse.  Reviewed patient's vitals  Encouraged in-person visit and she is scheduled today for in person follow up      -Continue psychotherapy     Previously discussed risks, benefits, AE's (incl serotonin syndrome) SE's (including but not limited to GI sx, insomnia, agitation/anxiety/activation, appetite changes, HA, hypotension, others) inherent unpredictability of illness and treatment option    Discussed informed consent, diagnosis, risks and benefits of proposed treatment above vs alternative treatments vs no treatment, and potential side effects of these treatments. The patient expresses understanding of the above and displays the capacity to agree with this treatment given said understanding. Patient also agrees that, currently, the benefits outweigh the risks and would like to pursue treatment at this time. Answered all questions and discussed follow up. Encouraged patient to contact us with any questions or concerns.    Encouraged Patient to keep future appointment    Take medications as prescribed     Patient to present to Emergency Department for thoughts to harm herself or others      Return to Clinic: 3 months or sooner if needed    BREANNA Slade-BC

## 2025-01-22 ENCOUNTER — OFFICE VISIT (OUTPATIENT)
Facility: CLINIC | Age: 45
End: 2025-01-22
Attending: OBSTETRICS & GYNECOLOGY
Payer: COMMERCIAL

## 2025-01-22 DIAGNOSIS — N95.1 PERIMENOPAUSAL SYMPTOMS: Primary | ICD-10-CM

## 2025-01-22 DIAGNOSIS — N94.3 PMS (PREMENSTRUAL SYNDROME): ICD-10-CM

## 2025-01-22 DIAGNOSIS — N95.1 MENOPAUSAL SYMPTOM: ICD-10-CM

## 2025-01-22 PROCEDURE — 98006 SYNCH AUDIO-VIDEO EST MOD 30: CPT | Mod: 95,,, | Performed by: OBSTETRICS & GYNECOLOGY

## 2025-01-22 PROCEDURE — 1160F RVW MEDS BY RX/DR IN RCRD: CPT | Mod: CPTII,95,, | Performed by: OBSTETRICS & GYNECOLOGY

## 2025-01-22 PROCEDURE — 1159F MED LIST DOCD IN RCRD: CPT | Mod: CPTII,95,, | Performed by: OBSTETRICS & GYNECOLOGY

## 2025-01-22 NOTE — PROGRESS NOTES
"The patient location is: ***  The chief complaint leading to consultation is: ***    Visit type: {TELE AUDIOVISUAL:57927}    Face to Face time with patient: ***  *** minutes of total time spent on the encounter, which includes face to face time and non-face to face time preparing to see the patient (eg, review of tests), Obtaining and/or reviewing separately obtained history, Documenting clinical information in the electronic or other health record, Independently interpreting results (not separately reported) and communicating results to the patient/family/caregiver, or Care coordination (not separately reported).         Each patient to whom he or she provides medical services by telemedicine is:  (1) informed of the relationship between the physician and patient and the respective role of any other health care provider with respect to management of the patient; and (2) notified that he or she may decline to receive medical services by telemedicine and may withdraw from such care at any time.    Notes:    Stormy Carlisle is a 44 y.o. female who presents to discuss hormone replacement therapy.  Menarche occurred at age *** and the patient went into menopause at *** years of age, which was *** years ago. Patient is requesting hormone replacement therapy due to {hrt reasons:91021}, {menopause s ymptoms:27797}.The patient {is/is not:97496} taking hormone replacement therapy. {post-men bleed:81041::"Patient denies post-menopausal vaginal bleeding."} The patient {is/is not/has never been:77324} sexually active.  She denies the following contraindications to HRT:  Vaginal bleeding, history of VTE/PE, thrombophilia,  breast cancer, or active liver disease.       PCP: ***       Routine labs: ***  Pap smear: 3/27/2024  Mammogram: ***  DEXA: ***  Colonoscopy: ***    Admission on 11/13/2024, Discharged on 11/13/2024   Component Date Value Ref Range Status    POC Preg Test, Ur 11/13/2024 Negative  Negative Final    Quality " Control Acceptable 2024 Yes   Final    Final Pathologic Diagnosis 2024    Final                    Value:Rectum, polyp 2 mm, biopsy:   Hyperplastic polyp      Gross 2024    Final                    Value:Patient ID/MRN:  [id number]   Pathology label MRN:  [id number]    The specimen is received in formalin labeled &quot;rectum polyp&quot;. The specimen consists of 1 tan-yellow soft tissue fragment measuring 0.3 x 0.2 x 0.2 cm. The specimen is submitted entirely in cassette [facility]--1-A.    Clara Rothman  Grossing Technologist      Disclaimer 2024 Unless the case is a 'gross only' or additional testing only, the final diagnosis for each specimen is based on a microscopic examination of appropriate tissue sections.   Final       Past Medical History:   Diagnosis Date    Abnormal Pap smear     Colposcopy    ADHD (attention deficit hyperactivity disorder)     Anxiety     Depression      Past Surgical History:   Procedure Laterality Date    Breast augmentation      COLONOSCOPY N/A 2024    Procedure: COLONOSCOPY;  Surgeon: Andre Jones MD;  Location: [facility];  Service: Endoscopy;  Laterality: N/A;  ref by / maida inst portal-RB   precall complete-st    INDUCED   2001     Social History     Tobacco Use    Smoking status: Never    Smokeless tobacco: Never   Substance Use Topics    Alcohol use: Yes     Alcohol/week: 5.0 standard drinks of alcohol     Types: 5 Standard drinks or equivalent per week    Drug use: No     Family History   Problem Relation Name Age of Onset    Colon cancer Paternal Grandmother  62    Valvular heart disease Father          s/p replacement    No Known Problems Mother      Breast cancer Paternal Aunt  58    Ovarian cancer Neg Hx      Diabetes Neg Hx      Hypertension Neg Hx      Stroke Neg Hx       OB History    Para Term  AB Living   4 2 2   2 2   SAB IAB Ectopic Multiple Live Births   0 1   0 2      # Outcome Date  GA Lbr Caio/2nd Weight Sex Type Anes PTL Lv   4 Term 01/15/15 40w4d / 00:35 3.175 kg (7 lb) F Vag-Spont EPI N DIVINE      Birth Comments: Hep B given   High intermediate bilirubin at discharge     3 AB 08/15/13 6w0d     Gen     2 Term 08/19/11 39w0d  3.515 kg (7 lb 12 oz) F Vag-Spont EPI N DIVINE      Birth Comments: System Generated. Please review and update pregnancy details.      Complications: Abruptio Placenta   1 IAB 2001               Current Outpatient Medications:     clobetasol 0.05% (TEMOVATE) 0.05 % Oint, Apply topically 2 (two) times daily., Disp: 45 g, Rfl: 0    [START ON 2/20/2025] dextroamphetamine-amphetamine (ADDERALL) 15 mg tablet, Take 1 tablet (15 mg total) by mouth 2 (two) times a day., Disp: 60 tablet, Rfl: 0    [START ON 3/20/2025] dextroamphetamine-amphetamine (ADDERALL) 15 mg tablet, Take 1 tablet (15 mg total) by mouth 2 (two) times a day., Disp: 30 tablet, Rfl: 0    FLUoxetine 20 MG capsule, Take 1 capsule (20 mg total) by mouth once daily., Disp: 90 capsule, Rfl: 1    Review of Systems:  General: No fever, chills, or weight loss.  Chest: No chest pain, shortness of breath, or palpitations.  Breast: No pain, masses, or nipple discharge.  Vulva: No pain, lesions, or itching.  Vagina: No relaxation, itching, discharge, or lesions.  Abdomen: No pain, nausea, vomiting, diarrhea, or constipation.  Urinary: No incontinence, nocturia, frequency, or dysuria.  Extremities:  No leg cramps, edema, or calf pain.  Neurologic: No headaches, dizziness, or visual changes.    There were no vitals filed for this visit.  There is no height or weight on file to calculate BMI.    Assessment:    Perimenopausal symptoms    Menopausal symptom  -     Ambulatory referral/consult to [facility]  -     Follicle Stimulating Hormone; Future; Expected date: 01/22/2025  -     Estradiol; Future; Expected date: 01/22/2025  -     Progesterone; Future; Expected date: 01/22/2025  -     ANTIMULLERIAN HORMONE  ([facility]); Future; Expected date: 01/22/2025    PMS (premenstrual syndrome)        Plan:   Risks and benefits of hormone replacement therapy were discussed.  Hormone replacement therapy options, including bioidentical versus non-bioidentical hormones, as well as alternatives discussed.      Follow up in {1-10:57428} {time; units:45877}  Will recheck labs once on typical optimal dose or if having side effects.  Instructed patient to call if she experiences any side effects or has any questions.  I spent *** minutes with this patient today, >50% counseling.

## 2025-01-22 NOTE — PROGRESS NOTES
"The patient location is: Home  The chief complaint leading to consultation is: Perimenopause    Visit type: audiovisual    Face to Face time with patient: 25 minutes  35 minutes of total time spent on the encounter, which includes face to face time and non-face to face time preparing to see the patient (eg, review of tests), Obtaining and/or reviewing separately obtained history, Documenting clinical information in the electronic or other health record, Independently interpreting results (not separately reported) and communicating results to the patient/family/caregiver, or Care coordination (not separately reported).         Each patient to whom he or she provides medical services by telemedicine is:  (1) informed of the relationship between the physician and patient and the respective role of any other health care provider with respect to management of the patient; and (2) notified that he or she may decline to receive medical services by telemedicine and may withdraw from such care at any time.    Notes:    Stormy Carlisle is a 44 y.o. female who presents to discuss hormone replacement therapy.  Menarche occurred at age 11 and the patient is not yet menopausal, still having menstrual cycles every month with no intermenstrual bleeding. She first began OCP's around age 14 to control cycles, now Cycles have become heavier since stopping OCP's when her spouse had a Vasectomy a few years ago(4 years ago).  Patient is requesting hormone replacement therapy due to worsening PMS, Depression  and worsening mood swings during the week before her periods.  She has a long history of depression, and has been on Prozac for about the past 20 years, which she began in Graduate school.  She was tried on Wellbutrin and Effexor at different times in the past which were "Horrific", and worsened her anxiety.   Lately, she states she feels "different", with mood swings worse before the cycle, as well as Negative Self talk and even small " things making her feel unstable and unable to cope. She has decreased energy.  Also reports occasional Night sweats about twice a month for the past few months.  She reports that she has tried to manage the symptoms with Yoga every other day and walking a lot.    The patient is sexually active.  She denies the following contraindications to HRT:  Vaginal bleeding, history of VTE/PE, thrombophilia,  breast cancer, or active liver disease.       PCP: LISA       Routine labs: NA  Pap smear: 3/27/2024Normal, Neg HR HPV  Mammogram: 3-:BI-RADS Category 1: Negative   DEXA: NA  Colonoscopy: 11-    Admission on 11/13/2024, Discharged on 11/13/2024   Component Date Value Ref Range Status    POC Preg Test, Ur 11/13/2024 Negative  Negative Final     Acceptable 11/13/2024 Yes   Final    Final Pathologic Diagnosis 11/13/2024    Final                    Value:Rectum, polyp 2 mm, biopsy:   Hyperplastic polyp      Gross 11/13/2024    Final                    Value:Patient ID/MRN:  [id number]   Pathology label MRN:  [id number]    The specimen is received in formalin labeled &quot;rectum polyp&quot;. The specimen consists of 1 tan-yellow soft tissue fragment measuring 0.3 x 0.2 x 0.2 cm. The specimen is submitted entirely in cassette [facility]--1-A.    Clara Connors Technologist      Disclaimer 11/13/2024 Unless the case is a 'gross only' or additional testing only, the final diagnosis for each specimen is based on a microscopic examination of appropriate tissue sections.   Final       Past Medical History:   Diagnosis Date    Abnormal Pap smear 2002    Colposcopy    ADHD (attention deficit hyperactivity disorder)     Anxiety     Depression      Past Surgical History:   Procedure Laterality Date    Breast augmentation      COLONOSCOPY N/A 11/13/2024    Procedure: COLONOSCOPY;  Surgeon: Andre Jones MD;  Location: [facility] ENDOSCOPY;  Service: Endoscopy;  Laterality: N/A;  ref by / suprep inst  portal-RB   precall complete-st    INDUCED   2001     Social History     Tobacco Use    Smoking status: Never    Smokeless tobacco: Never   Substance Use Topics    Alcohol use: Yes     Alcohol/week: 5.0 standard drinks of alcohol     Types: 5 Standard drinks or equivalent per week    Drug use: No     Family History   Problem Relation Name Age of Onset    Colon cancer Paternal Grandmother  62    Valvular heart disease Father          s/p replacement    No Known Problems Mother      Breast cancer Paternal Aunt  58    Ovarian cancer Neg Hx      Diabetes Neg Hx      Hypertension Neg Hx      Stroke Neg Hx       OB History    Para Term  AB Living   4 2 2   2 2   SAB IAB Ectopic Multiple Live Births   0 1   0 2      # Outcome Date GA Lbr Caio/2nd Weight Sex Type Anes PTL Lv   4 Term 01/15/15 40w4d / 00:35 3.175 kg (7 lb) F Vag-Spont EPI N DIVINE      Birth Comments: Hep B given   High intermediate bilirubin at discharge     3 AB 08/15/13 6w0d     Gen     2 Term 11 39w0d  3.515 kg (7 lb 12 oz) F Vag-Spont EPI N DIVINE      Birth Comments: System Generated. Please review and update pregnancy details.      Complications: Abruptio Placenta   1 IAB                Current Outpatient Medications:     clobetasol 0.05% (TEMOVATE) 0.05 % Oint, Apply topically 2 (two) times daily., Disp: 45 g, Rfl: 0    [START ON 2025] dextroamphetamine-amphetamine (ADDERALL) 15 mg tablet, Take 1 tablet (15 mg total) by mouth 2 (two) times a day., Disp: 60 tablet, Rfl: 0    [START ON 3/20/2025] dextroamphetamine-amphetamine (ADDERALL) 15 mg tablet, Take 1 tablet (15 mg total) by mouth 2 (two) times a day., Disp: 30 tablet, Rfl: 0    FLUoxetine 20 MG capsule, Take 1 capsule (20 mg total) by mouth once daily., Disp: 90 capsule, Rfl: 1    Review of Systems:  General: No fever, chills, or weight loss.  Chest: No chest pain, shortness of breath, or palpitations.  Breast: No pain, masses, or nipple discharge.  Vulva:  No pain, lesions, or itching.  Vagina: No relaxation, itching, discharge, or lesions.  Abdomen: No pain, nausea, vomiting, diarrhea, or constipation.  Urinary: No incontinence, nocturia, frequency, or dysuria.  Extremities:  No leg cramps, edema, or calf pain.  Neurologic: No headaches, dizziness, or visual changes.    There were no vitals filed for this visit.  There is no height or weight on file to calculate BMI.    Assessment:    Perimenopausal symptoms    Menopausal symptom  -     Ambulatory referral/consult to [facility]  -     Follicle Stimulating Hormone; Future; Expected date: 01/22/2025  -     Estradiol; Future; Expected date: 01/22/2025  -     Progesterone; Future; Expected date: 01/22/2025  -     ANTIMULLERIAN HORMONE ([facility]); Future; Expected date: 01/22/2025    PMS (premenstrual syndrome)        Plan:   Risks and benefits of hormone replacement therapy were discussed.  Hormone replacement therapy options, including bioidentical versus non-bioidentical hormones, as well as alternatives discussed.  PLAN:   Consider use of low dose Progesterone to help with symptoms, and improve sleep.       Follow up in several months  Will recheck labs once on typical optimal dose or if having side effects.  Instructed patient to call if she experiences any side effects or has any questions.

## 2025-01-29 ENCOUNTER — PATIENT MESSAGE (OUTPATIENT)
Dept: PSYCHIATRY | Facility: CLINIC | Age: 45
End: 2025-01-29
Payer: COMMERCIAL

## 2025-01-29 ENCOUNTER — OFFICE VISIT (OUTPATIENT)
Dept: PSYCHIATRY | Facility: CLINIC | Age: 45
End: 2025-01-29
Payer: COMMERCIAL

## 2025-01-29 DIAGNOSIS — F41.1 GAD (GENERALIZED ANXIETY DISORDER): ICD-10-CM

## 2025-01-29 DIAGNOSIS — F90.0 ATTENTION DEFICIT HYPERACTIVITY DISORDER (ADHD), PREDOMINANTLY INATTENTIVE TYPE: Primary | Chronic | ICD-10-CM

## 2025-01-29 PROCEDURE — 99214 OFFICE O/P EST MOD 30 MIN: CPT | Mod: S$GLB,,, | Performed by: NURSE PRACTITIONER

## 2025-01-29 PROCEDURE — G2211 COMPLEX E/M VISIT ADD ON: HCPCS | Mod: S$GLB,,, | Performed by: NURSE PRACTITIONER

## 2025-01-29 PROCEDURE — 99999 PR PBB SHADOW E&M-EST. PATIENT-LVL I: CPT | Mod: PBBFAC,,, | Performed by: NURSE PRACTITIONER

## 2025-01-29 RX ORDER — DEXTROAMPHETAMINE SACCHARATE, AMPHETAMINE ASPARTATE, DEXTROAMPHETAMINE SULFATE AND AMPHETAMINE SULFATE 3.75; 3.75; 3.75; 3.75 MG/1; MG/1; MG/1; MG/1
15 TABLET ORAL 2 TIMES DAILY
Qty: 30 TABLET | Refills: 0 | Status: SHIPPED | OUTPATIENT
Start: 2025-03-30 | End: 2025-04-29

## 2025-01-29 RX ORDER — FLUOXETINE HYDROCHLORIDE 20 MG/1
20 CAPSULE ORAL DAILY
Qty: 90 CAPSULE | Refills: 1 | Status: SHIPPED | OUTPATIENT
Start: 2025-01-29

## 2025-01-29 RX ORDER — DEXTROAMPHETAMINE SACCHARATE, AMPHETAMINE ASPARTATE MONOHYDRATE, DEXTROAMPHETAMINE SULFATE AND AMPHETAMINE SULFATE 3.75; 3.75; 3.75; 3.75 MG/1; MG/1; MG/1; MG/1
15 CAPSULE, EXTENDED RELEASE ORAL EVERY MORNING
Qty: 30 CAPSULE | Refills: 0 | Status: SHIPPED | OUTPATIENT
Start: 2025-01-29 | End: 2025-02-28

## 2025-01-29 RX ORDER — DEXTROAMPHETAMINE SACCHARATE, AMPHETAMINE ASPARTATE, DEXTROAMPHETAMINE SULFATE AND AMPHETAMINE SULFATE 3.75; 3.75; 3.75; 3.75 MG/1; MG/1; MG/1; MG/1
15 TABLET ORAL 2 TIMES DAILY
Qty: 60 TABLET | Refills: 0 | Status: SHIPPED | OUTPATIENT
Start: 2025-02-15 | End: 2025-03-17

## 2025-01-29 NOTE — PROGRESS NOTES
Outpatient Psychiatry Follow-Up Visit (MD/NP)    1/29/2025      The patient location is:  [place]    The chief complaint leading to consultation is: inattention, depression and anxiety    Visit type: audio only    Time with patient:  15 minutes  20 minutes of total time spent on the encounter, which includes face to face time and non-face to face time preparing to see the patient (eg, review of tests), Obtaining and/or reviewing separately obtained history, Documenting clinical information in the electronic or other health record, Independently interpreting results (not separately reported) and communicating results to the patient/family/caregiver, or Care coordination (not separately reported).         Each patient to whom he or she provides medical services by telemedicine is:  (1) informed of the relationship between the physician and patient and the respective role of any other health care provider with respect to management of the patient; and (2) notified that he or she may decline to receive medical services by telemedicine and may withdraw from such care at any time.    Notes:         Clinical Status of Patient:  Outpatient (Ambulatory)    Chief Complaint:  Stormy Carlisle is a 44 y.o. female who presents today for follow-up of depression, anxiety and attention problems. She is taking  20 mg Prozac & adderall 15 mg BID for ADHD.    Current Psychiatric Medications:  Prozac 20 mg daily for mood  Adderall 15 mg PO BID  Xanax 0.25 mg po RPN (rarely takes for severe anxiety)    Interval History and Content of Current Session:    Patient presented for follow up today. She stated she is originally scheduled for in person follow up and wanted to be switched to a virtual visit. A message is sent to supportive staff to switch her visit. In the meantime, called patient and we had a phone visit. She stated that she has been doing well since her last visit. She reported her children are also doing well too. She stated  "her youngest daughter likes new school. She stated she continues to work on her art and still enjoys it. She continues to report well managed anxiety with prozac 20 mg po daily and her effective coping strategies. She denied feeling depressed and when she feels stressed she continues to utilize healthy coping skills like swimming, gardening, art, and spending time with her kids. She described her mood as "good".    She continues to report adderall 15 mg po BID is still very helpful in improving her focus, attention, concentration, and productivity. She continues to report good sleep and appetite. She continues to report managed depression and anxiety with Prozac 20 mg po daily and coping strategies. She denied SE and AE including jitteriness, CP, or palpitations. She stated she takes xanax occasionally and only when she feels severely anxious or panicky.    She denied SI/HI/AVH. She denied suicidal ideation and no suicide plan or intent. She denied previous suicide attempts. She denied access to guns. She denied hopelessness, amotvation, or anhedonia. No objective signs or symptoms of jonny or psychosis. Patient VS are within normal range and no history of hypertension. She denied alcohol or drug abuse.     Past med trials: Wellbutrin (helped mood/motivation, but caused her to pass out, have decrease appetite, weight loss), Paxil was very hard to come off of and does not remember how much it helped. Prozac worked but was insufficiently helpful, venlafaxine (brain zaps and interfered with sleep) and vistaril (too groggy, sedating and made her feel tired that caused her to feel anxious and depressed)    Psychiatric Review Of Systems - Is patient experiencing or having changes in:  sleep: no  appetite: no  weight: no  energy/anergy: no  interest/pleasure/anhedonia: no  somatic symptoms: no  anxiety/panic: yes but manageable  guilty/hopelessness: no  concentration: yes Improved with adderall 15 mg po BID  S.I.B.s/risky " "behavior: no  Irritability: no  Racing thoughts: no  Impulsive behaviors: no  Paranoia:no  AVH:no  Suicidal thoughts/plan/intent: no  SE: no  ETOH: no  Drugs: no    Review of Systems     MEDICAL REVIEW OF SYSTEMS  History obtained from the patient  General : NO chills or fever  Eyes: NO  visual changes  ENT: NO  nasal discharge or sore throat  Endocrine: NO weight changes  Dermatological: NO rashes  Respiratory: NO cough, shortness of breath  Cardiovascular: NO chest pain, palpitations or racing heart  Gastrointestinal: NO nausea, vomiting, constipation or diarrhea  Musculoskeletal: NO muscle pain or stiffness  Neurological: NO , dizziness, headaches or tremors  Psychiatric: please see HPI        Past Medical, Family and Social History: The patient's past medical, family and social history have been reviewed and updated as appropriate within the electronic medical record - see encounter notes.  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 Has been through CBT but not recently. Med trials as above plus ambien per chart in 2015    Compliance: yes    Side effects: None    Risk Parameters:  Patient reports no suicidal ideation  Patient reports no homicidal ideation  Patient reports no self-injurious behavior  Patient reports no violent behavior    Exam (detailed: at least 9 elements; comprehensive: all 15 elements)   Constitutional  Vitals:  Most recent vital signs, dated greater than 90 days prior to this appointment, were reviewed.   There were no vitals filed for this visit.       General:  unremarkable, age appropriate     Musculoskeletal  Muscle Strength/Tone:  no dystonia, no tremor, no tic   Gait & Station:  Normal gait     Psychiatric  Speech:  no latency; no press, spontaneous,    Mood & Affect:  "Good"  Mood congruent and appropriate and with full range   Thought Process:  normal and logical, logical   Associations:  intact   Thought " Content:  normal, no suicidality, no homicidality, delusions, or paranoia   Insight:  intact, has awareness of illness   Judgement: behavior is adequate to circumstances, age appropriate   Orientation:  grossly intact   Memory: intact for content of interview, able to remember recent events- yes, able to remember remote events- yes   Language: grossly intact, able to name, able to repeat   Attention Span & Concentration:  able to focus with adderall   Fund of Knowledge:  intact and appropriate to age and level of education     Assessment and Diagnosis   Status/Progress: Based on the examination today, the patient's problem(s) is/are not well controlled.  New problems have not been presented today.   Co-morbidities, Diagnostic uncertainty and Lack of compliance are not complicating management of the primary condition.  There are no active rule-out diagnoses for this patient at this time.     General Impression: Doing well on her current medication regimen of adderall 15 mg po BID and prozac 20 mg po daily. No changes in dosages due to their effectiveness in treating her ADHD, depression, and anxiety.      ICD-10-CM ICD-9-CM   1. Attention deficit hyperactivity disorder (ADHD), predominantly inattentive type  F90.0 314.00   2. JAMEY (generalized anxiety disorder)  F41.1 300.02                Intervention/Counseling/Treatment Plan   In person visit 10/30/2024  Depression & Anxiety  Continue prozac 20 mg to help with depressive sx  Continue xanax 0.25 mg po PRN daily for severe anxiety and panic attacks (rarely takes it)  Informed pt of the risks of continuous Benzodiazepine use including tolerance, dependence, dizziness, drowsiness, memory problems, and withdrawals that may be life threatening upon abrupt cessation. Also advised not to take Benzodiazepines with Opiates or other sedatives and also not to drive or operate heavy machinery while using Benzodiazepines. Avoid alcohol with Benzodiazapine. Patient agreed to take  it and verbalized understanding  -We discussed risk of Serotonin Syndrome including symptoms in order of appearance: diarrhea, restlessness, extreme agitation, autonomic instability, hyperthermia, rigidity, coma, and death.  -Discussed black box warning of increased Suicidal thoughts in individuals younger than 24 years old and the steps to take if patient ever experiences suicide ideation (contact family, suicide hotline [phone], call 911 or go the nearest emergency room       Reviewed patient's most recent vitals     ADHD, inattentive type  Continue Adderall IR 15 mg PO BID. Checked  and no signs of misuse.  Reviewed patient's vitals  Encouraged in-person visit      -Continue psychotherapy     Previously discussed risks, benefits, AE's (incl serotonin syndrome) SE's (including but not limited to GI sx, insomnia, agitation/anxiety/activation, appetite changes, HA, hypotension, others) inherent unpredictability of illness and treatment option    Discussed informed consent, diagnosis, risks and benefits of proposed treatment above vs alternative treatments vs no treatment, and potential side effects of these treatments. The patient expresses understanding of the above and displays the capacity to agree with this treatment given said understanding. Patient also agrees that, currently, the benefits outweigh the risks and would like to pursue treatment at this time. Answered all questions and discussed follow up. Encouraged patient to contact us with any questions or concerns.    Encouraged Patient to keep future appointment    Take medications as prescribed     Patient to present to Emergency Department for thoughts to harm herself or others      Return to Clinic: 3 months or sooner if needed    BREANNA Slade-BC

## 2025-02-05 ENCOUNTER — PATIENT MESSAGE (OUTPATIENT)
Dept: PSYCHIATRY | Facility: CLINIC | Age: 45
End: 2025-02-05
Payer: COMMERCIAL

## 2025-02-05 DIAGNOSIS — F90.0 ATTENTION DEFICIT HYPERACTIVITY DISORDER (ADHD), PREDOMINANTLY INATTENTIVE TYPE: Primary | ICD-10-CM

## 2025-02-05 RX ORDER — DEXTROAMPHETAMINE SACCHARATE, AMPHETAMINE ASPARTATE, DEXTROAMPHETAMINE SULFATE AND AMPHETAMINE SULFATE 3.75; 3.75; 3.75; 3.75 MG/1; MG/1; MG/1; MG/1
15 TABLET ORAL 2 TIMES DAILY
Qty: 60 TABLET | Refills: 0 | Status: SHIPPED | OUTPATIENT
Start: 2025-02-05 | End: 2025-03-07

## 2025-02-12 ENCOUNTER — OFFICE VISIT (OUTPATIENT)
Dept: URGENT CARE | Facility: CLINIC | Age: 45
End: 2025-02-12
Payer: COMMERCIAL

## 2025-02-12 VITALS
HEART RATE: 75 BPM | WEIGHT: 125 LBS | HEIGHT: 62 IN | DIASTOLIC BLOOD PRESSURE: 79 MMHG | RESPIRATION RATE: 18 BRPM | OXYGEN SATURATION: 98 % | TEMPERATURE: 99 F | BODY MASS INDEX: 23 KG/M2 | SYSTOLIC BLOOD PRESSURE: 108 MMHG

## 2025-02-12 DIAGNOSIS — J02.9 SORE THROAT: Primary | ICD-10-CM

## 2025-02-12 LAB
CTP QC/QA: YES
MOLECULAR STREP A: NEGATIVE
POC MOLECULAR INFLUENZA A AGN: NEGATIVE
POC MOLECULAR INFLUENZA B AGN: NEGATIVE
SARS CORONAVIRUS 2 ANTIGEN: NEGATIVE

## 2025-02-12 PROCEDURE — 99203 OFFICE O/P NEW LOW 30 MIN: CPT | Mod: S$GLB,,, | Performed by: NURSE PRACTITIONER

## 2025-02-12 PROCEDURE — 87651 STREP A DNA AMP PROBE: CPT | Mod: QW,S$GLB,, | Performed by: NURSE PRACTITIONER

## 2025-02-12 PROCEDURE — 87811 SARS-COV-2 COVID19 W/OPTIC: CPT | Mod: QW,S$GLB,, | Performed by: NURSE PRACTITIONER

## 2025-02-12 PROCEDURE — 87502 INFLUENZA DNA AMP PROBE: CPT | Mod: QW,S$GLB,, | Performed by: NURSE PRACTITIONER

## 2025-02-12 RX ORDER — ONDANSETRON 4 MG/1
TABLET, ORALLY DISINTEGRATING ORAL
COMMUNITY
Start: 2024-07-03

## 2025-02-12 NOTE — PROGRESS NOTES
"Subjective:      Patient ID: Stormy Carlisle is a 44 y.o. female.    Vitals:  height is 5' 2" (1.575 m) and weight is 56.7 kg (125 lb). Her oral temperature is 98.5 °F (36.9 °C). Her blood pressure is 108/79 and her pulse is 75. Her respiration is 18 and oxygen saturation is 98%.     Chief Complaint: Sore Throat (Sore throat and stuffy nose - Entered by patient)    Pt is a 45 yo female presenting with complaints of a sore throat and congestion. Symptoms started today are are unchanged and worse on rght side. Pain with swallowing. OTC ibuprofen taken with mild relief.  Pt reports Strep exposure from daughter.    Sore Throat   This is a new problem. The current episode started today. The problem has been unchanged. The pain is worse on the right side. There has been no fever. The pain is at a severity of 4/10. The pain is mild. Associated symptoms include congestion, swollen glands and trouble swallowing. Pertinent negatives include no abdominal pain, coughing, diarrhea, drooling, ear discharge, ear pain, headaches, hoarse voice, plugged ear sensation, neck pain, shortness of breath, stridor or vomiting. She has had exposure to strep. She has had no exposure to mono. She has tried acetaminophen for the symptoms. The treatment provided mild relief.     Constitution: Negative for chills, fatigue and fever.   HENT:  Positive for congestion, sore throat and trouble swallowing. Negative for ear pain, ear discharge, drooling and sinus pain.    Neck: Negative for neck pain.   Cardiovascular:  Negative for chest pain.   Respiratory:  Negative for cough, sputum production, shortness of breath and stridor.    Gastrointestinal:  Negative for abdominal pain, nausea, vomiting and diarrhea.   Musculoskeletal:  Negative for muscle ache.   Neurological:  Negative for headaches.      Objective:     Physical Exam   Constitutional: She is oriented to person, place, and time.   HENT:   Head: Normocephalic.   Ears:   Right Ear: Hearing and " "external ear normal. No no drainage, swelling or tenderness. Tympanic membrane is not erythematous, not retracted and not bulging. No middle ear effusion.   Left Ear: Hearing and external ear normal. No no drainage, swelling or tenderness. Tympanic membrane is not erythematous, not retracted and not bulging.  No middle ear effusion.   Nose: Nose normal. No mucosal edema, rhinorrhea or purulent discharge. Right sinus exhibits no maxillary sinus tenderness and no frontal sinus tenderness. Left sinus exhibits no maxillary sinus tenderness and no frontal sinus tenderness.   Mouth/Throat: Uvula is midline and mucous membranes are normal. No trismus in the jaw. No uvula swelling. Posterior oropharyngeal erythema present. No oropharyngeal exudate or posterior oropharyngeal edema.   Cardiovascular: Normal rate and regular rhythm.   Pulmonary/Chest: Effort normal and breath sounds normal.   Neurological: She is alert and oriented to person, place, and time.   Skin: Skin is warm and dry.   Nursing note and vitals reviewed.      Assessment:     1. Sore throat        Plan:       Sore throat  -     POCT Strep A, Molecular  -     POCT Influenza A/B MOLECULAR  -     SARS Coronavirus 2 Antigen, POCT Manual Read      Patient Instructions   Sore throat  -     POCT Strep A, Molecular  -     POCT Influenza A/B MOLECULAR  -     SARS Coronavirus 2 Antigen, POCT Manual Read      Viral URI (upper respiratory infection):    Your symptoms are viral in nature.  Viral upper respiratory infections typically run their course in 7-10 days.     - Rest at home.     - Drink plenty of fluids so you won't get dehydrated.      Avoid taking Decongestants such as pseudoephedrine (ex. Sudafed) or phenylephrine (ex. Mucinex FastMax, Dayquil, Nyquil, or any combo cold meds that say "cold," "sinus" or "-D").        - Cough recommendations:   Warm tea with honey can help with cough. Honey is a natural cough suppressant.  - Dextromethorphan (DM) is a cough " suppressant over the counter (ie. mucinex DM OR delsym).        - Congestion recommendations:      - Mucinex (guaifenesin) twice a day (or as directed) to help loosen mucous.       - Fever/Pain recommendations:  Alternate Tylenol or Ibuprofen as directed for fever/pain.   - Motrin/Ibuprofen every 6-8 hours for pain and inflammation. Do not take ibuprofen if you have a history of GI bleeding, kidney disease, or if you take blood thinners.    - Tylenol/acetaminophen every 6-8 hours for added pain relief.  Avoid tylenol if you have a history of liver disease.       -Sore throat recommendations: Warm fluids, warm salt water gargles, throat lozenges, tea, honey, soup, or drinking something cold or frozen.  Throat lozenges or sprays help reduce pain. Gargling with warm saltwater (1/4 teaspoon of salt in 1/2 cup of warm water) or an OTC anesthetic gargle may be useful for irritation.    When to seek medical advice  Call your healthcare provider right away if any of these occur:  Fever that is poorly controlled with OTC fever reducing medication  New or worsening ear pain, sinus pain, or headache  Stiff neck  You can't swallow liquids or you can't open your mouth wide because of throat pain  Signs of dehydration. These include very dark urine or no urine, sunken eyes, and dizziness.  Trouble breathing or noisy breathing  Muffled voice  Rash     If your symptoms worsen or fail to improve you should go to Emergency Department.

## 2025-02-12 NOTE — PATIENT INSTRUCTIONS
"Sore throat  -     POCT Strep A, Molecular  -     POCT Influenza A/B MOLECULAR  -     SARS Coronavirus 2 Antigen, POCT Manual Read      Viral URI (upper respiratory infection):    Your symptoms are viral in nature.  Viral upper respiratory infections typically run their course in 7-10 days.     - Rest at home.     - Drink plenty of fluids so you won't get dehydrated.      Avoid taking Decongestants such as pseudoephedrine (ex. Sudafed) or phenylephrine (ex. Mucinex FastMax, Dayquil, Nyquil, or any combo cold meds that say "cold," "sinus" or "-D").        - Cough recommendations:   Warm tea with honey can help with cough. Honey is a natural cough suppressant.  - Dextromethorphan (DM) is a cough suppressant over the counter (ie. mucinex DM OR delsym).        - Congestion recommendations:      - Mucinex (guaifenesin) twice a day (or as directed) to help loosen mucous.       - Fever/Pain recommendations:  Alternate Tylenol or Ibuprofen as directed for fever/pain.   - Motrin/Ibuprofen every 6-8 hours for pain and inflammation. Do not take ibuprofen if you have a history of GI bleeding, kidney disease, or if you take blood thinners.    - Tylenol/acetaminophen every 6-8 hours for added pain relief.  Avoid tylenol if you have a history of liver disease.       -Sore throat recommendations: Warm fluids, warm salt water gargles, throat lozenges, tea, honey, soup, or drinking something cold or frozen.  Throat lozenges or sprays help reduce pain. Gargling with warm saltwater (1/4 teaspoon of salt in 1/2 cup of warm water) or an OTC anesthetic gargle may be useful for irritation.    When to seek medical advice  Call your healthcare provider right away if any of these occur:  Fever that is poorly controlled with OTC fever reducing medication  New or worsening ear pain, sinus pain, or headache  Stiff neck  You can't swallow liquids or you can't open your mouth wide because of throat pain  Signs of dehydration. These include very " dark urine or no urine, sunken eyes, and dizziness.  Trouble breathing or noisy breathing  Muffled voice  Rash     If your symptoms worsen or fail to improve you should go to Emergency Department.

## 2025-02-13 ENCOUNTER — TELEPHONE (OUTPATIENT)
Dept: OBSTETRICS AND GYNECOLOGY | Facility: CLINIC | Age: 45
End: 2025-02-13
Payer: COMMERCIAL

## 2025-02-13 NOTE — TELEPHONE ENCOUNTER
Called patient to assist with scheduling her lab and follow up appointment with Dr Johnson. Patient states she will call when she is ready to schedule her appointment.

## 2025-02-24 ENCOUNTER — RESULTS FOLLOW-UP (OUTPATIENT)
Dept: OBSTETRICS AND GYNECOLOGY | Facility: CLINIC | Age: 45
End: 2025-02-24

## 2025-03-12 ENCOUNTER — PATIENT MESSAGE (OUTPATIENT)
Facility: CLINIC | Age: 45
End: 2025-03-12
Payer: COMMERCIAL

## 2025-03-18 DIAGNOSIS — N95.1 PERIMENOPAUSE: Primary | ICD-10-CM

## 2025-03-18 RX ORDER — PROGESTERONE 100 MG/1
100 CAPSULE ORAL NIGHTLY
Qty: 30 CAPSULE | Refills: 6 | Status: SHIPPED | OUTPATIENT
Start: 2025-03-18 | End: 2026-03-18

## 2025-04-16 ENCOUNTER — OFFICE VISIT (OUTPATIENT)
Dept: URGENT CARE | Facility: CLINIC | Age: 45
End: 2025-04-16
Payer: COMMERCIAL

## 2025-04-16 VITALS
RESPIRATION RATE: 19 BRPM | DIASTOLIC BLOOD PRESSURE: 64 MMHG | HEART RATE: 85 BPM | BODY MASS INDEX: 24.38 KG/M2 | SYSTOLIC BLOOD PRESSURE: 113 MMHG | WEIGHT: 132.5 LBS | OXYGEN SATURATION: 100 % | TEMPERATURE: 98 F | HEIGHT: 62 IN

## 2025-04-16 DIAGNOSIS — S69.92XA WRIST INJURY, LEFT, INITIAL ENCOUNTER: ICD-10-CM

## 2025-04-16 DIAGNOSIS — S52.502A CLOSED FRACTURE OF DISTAL END OF LEFT RADIUS, UNSPECIFIED FRACTURE MORPHOLOGY, INITIAL ENCOUNTER: Primary | ICD-10-CM

## 2025-04-16 PROCEDURE — 99203 OFFICE O/P NEW LOW 30 MIN: CPT | Mod: S$GLB,,,

## 2025-04-16 PROCEDURE — 73110 X-RAY EXAM OF WRIST: CPT | Mod: LT,S$GLB,, | Performed by: RADIOLOGY

## 2025-04-16 RX ORDER — DEXTROAMPHETAMINE SACCHARATE, AMPHETAMINE ASPARTATE MONOHYDRATE, DEXTROAMPHETAMINE SULFATE AND AMPHETAMINE SULFATE 3.75; 3.75; 3.75; 3.75 MG/1; MG/1; MG/1; MG/1
CAPSULE, EXTENDED RELEASE ORAL EVERY MORNING
COMMUNITY
Start: 2025-01-29

## 2025-04-16 RX ORDER — DEXTROAMPHETAMINE SACCHARATE, AMPHETAMINE ASPARTATE, DEXTROAMPHETAMINE SULFATE AND AMPHETAMINE SULFATE 3.75; 3.75; 3.75; 3.75 MG/1; MG/1; MG/1; MG/1
1 TABLET ORAL 2 TIMES DAILY
COMMUNITY

## 2025-04-16 RX ORDER — NORGESTIMATE AND ETHINYL ESTRADIOL 7DAYSX3 LO
1 KIT ORAL DAILY
COMMUNITY

## 2025-04-16 NOTE — PROGRESS NOTES
"Subjective:      Patient ID: Stromy Carlisle is a 45 y.o. female.    Vitals:  height is 5' 2" (1.575 m) and weight is 60.1 kg (132 lb 7.9 oz). Her oral temperature is 98.3 °F (36.8 °C). Her blood pressure is 113/64 and her pulse is 85. Her respiration is 19 and oxygen saturation is 100%.     Chief Complaint: Wrist Injury    Pt presents today w/ lt wrist from a fall, ; incident occurred yesterday 04/15/25. Pt reports she [place] the park and fell forward and bent lt back coulter. Pt c/o slight edema , slight numbness , decrease in ROM , decrease in ability to bear weight and aching/stabbing sensation of lt wrist. Pt denies head trauma , blood loss , syncope , fever and emesis. Pt tired ibuprofen ;mild relief.     Wrist Injury   Her dominant hand is their right hand. The incident occurred 12 to 24 hours ago. The incident occurred at the park. The injury mechanism was a burn. The pain is present in the left wrist. The quality of the pain is described as aching, cramping and stabbing. The pain does not radiate. The pain is at a severity of 7/10. The pain is severe. The pain has been Constant since the incident. Associated symptoms include muscle weakness and numbness. Pertinent negatives include no chest pain or tingling. The symptoms are aggravated by movement, lifting and palpation. Treatments tried: ibuprofen. The treatment provided mild relief.       Constitution: Negative for fever.   Cardiovascular:  Negative for chest pain.   Musculoskeletal:  Positive for pain, trauma, joint pain, joint swelling and abnormal ROM of joint.   Skin:  Negative for wound and erythema.   Neurological:  Positive for numbness.      Objective:     Physical Exam   Constitutional: She is oriented to person, place, and time. She appears well-developed.   HENT:   Head: Normocephalic and atraumatic. Head is without abrasion, without contusion and without laceration.   Ears:   Right Ear: External ear normal.   Left Ear: External ear " normal.   Nose: Nose normal.   Mouth/Throat: Oropharynx is clear and moist and mucous membranes are normal.   Eyes: Conjunctivae, EOM and lids are normal. Pupils are equal, round, and reactive to light.   Neck: Trachea normal and phonation normal. Neck supple.   Cardiovascular: Normal rate, regular rhythm and normal heart sounds.   Pulmonary/Chest: Effort normal and breath sounds normal. No stridor. No respiratory distress.   Musculoskeletal:      Left wrist: She exhibits decreased range of motion, tenderness and swelling (bruising).   Neurological: She is alert and oriented to person, place, and time.   Skin: Skin is warm, dry, intact and no rash. Capillary refill takes less than 2 seconds. No abrasion, No burn, No bruising, No erythema and No ecchymosis   Psychiatric: Her speech is normal and behavior is normal. Judgment and thought content normal.   Nursing note and vitals reviewed.      Assessment:     1. Closed fracture of distal end of left radius, unspecified fracture morphology, initial encounter    2. Wrist injury, left, initial encounter        Plan:   EXAMINATION:  XR WRIST COMPLETE 3 VIEWS LEFT     CLINICAL HISTORY:  Unspecified injury of left wrist, hand and finger(s), initial encounter     TECHNIQUE:  PA, lateral, and oblique views of the left wrist were performed.     COMPARISON:  None     FINDINGS:  Subtle acute nondisplaced hairline fractures are seen involving the distal radius centered in the metaphyseal region.  No additional acute displaced fracture or dislocation seen.  No radiopaque retained foreign body identified.     Impression:     Acute nondisplaced distal radius hairline fracture.        Electronically signed by:Kobi Loving MD  Date:                                            04/16/2025  Time:                                           18:50      Closed fracture of distal end of left radius, unspecified fracture morphology, initial encounter  -     SPLINT FOR HOME USE  -     SLING FOR  HOME USE  -     Ambulatory referral/consult to Orthopedics    Wrist injury, left, initial encounter  -     XR WRIST COMPLETE 3 VIEWS LEFT; Future; Expected date: 04/16/2025            Discussed results/diagnosis/plan with patient in clinic. Strict precautions given to patient to monitor for worsening signs and symptoms. Advised to follow up with PCP or specialist.  Explained side effects of medications prescribed with patient and informed him/her to discontinue use if he/she has any side effects and to inform UC or PCP if this occurs. All questions answered. Strict ED verses clinic return precautions stressed and given in depth. Advised if symptoms worsens of fail to improve he/she should go to the Emergency Room. Discharge and follow-up instructions given verbally/printed with the patient who expressed understanding and willingness to comply with my recommendations. Patient voiced understanding and in agreement with current treatment plan. Patient exits the exam room in no acute distress. Conversant and engaged during discharge discussion, verbalized understanding.

## 2025-04-17 ENCOUNTER — TELEPHONE (OUTPATIENT)
Dept: ORTHOPEDICS | Facility: CLINIC | Age: 45
End: 2025-04-17
Payer: COMMERCIAL

## 2025-04-17 ENCOUNTER — OFFICE VISIT (OUTPATIENT)
Dept: ORTHOPEDICS | Facility: CLINIC | Age: 45
End: 2025-04-17
Payer: COMMERCIAL

## 2025-04-17 DIAGNOSIS — S52.522A TRAUMATIC CLOSED NONDISP METAPHYSEAL TORUS FRACTURE OF DISTAL RADIUS, LEFT, INITIAL ENCOUNTER: Primary | ICD-10-CM

## 2025-04-17 PROCEDURE — 99999 PR PBB SHADOW E&M-EST. PATIENT-LVL III: CPT | Mod: PBBFAC,,, | Performed by: ORTHOPAEDIC SURGERY

## 2025-04-17 PROCEDURE — 1159F MED LIST DOCD IN RCRD: CPT | Mod: CPTII,S$GLB,, | Performed by: ORTHOPAEDIC SURGERY

## 2025-04-17 PROCEDURE — 99204 OFFICE O/P NEW MOD 45 MIN: CPT | Mod: S$GLB,,, | Performed by: ORTHOPAEDIC SURGERY

## 2025-04-17 NOTE — PATIENT INSTRUCTIONS
Keep the splint on for immobilization and compression.   Rest and elevate the affected painful area.    Apply cold compresses intermittently as needed.    Avoid activities that cause pain.   As pain recedes, begin normal activities slowly as tolerated.      Alternate tylenol and ibuprofen every 4 hours as needed for pain. Always take ibuprofen with food and water to avoid GI upset. Stop taking if you develop abdominal pain or blood in stool.     Follow up with ortho in a few days. A referral was placed for you, call [phone] to schedule appointment.

## 2025-04-17 NOTE — TELEPHONE ENCOUNTER
----- Message from Eva sent at 4/17/2025  9:32 AM CDT -----  Regarding: pt  Name of Who is Calling:Pt What is the request in detail: Pt scheduled appt today through Portal inquiring if its okay for her to come in on today. Also pt needs x-rays Can the clinic reply by [facility]: yes What Number to Call Back if not in [facility]:Telephone Information:Mobile          [phone]

## 2025-04-17 NOTE — PROGRESS NOTES
[facility]  History & Physical  Orthopedics    SUBJECTIVE:      History of Present Illness    CHIEF COMPLAINT:  - Left wrist injury    HPI:  Stormy presents with left wrist pain following a roller skating accident two days ago. Initially thought to be a sprain, the pain felt different this time. Pain is most severe when moving the thumb and also present in the back of the wrist. She rates the pain as 7/10. She can move fingers without significant pain but has considerable discomfort with thumb movement.    She visited urgent care the night before, where they applied a large splint and referred for further evaluation. XRs taken at urgent care revealed a fracture in the radius bone, with visible crack lines in multiple views. She expresses concern about potentially needing a splint up to the elbow and reports difficulty sleeping with the current splint.    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      ROS:  Constitutional: +sleep disturbances, +sleep difficulty  Musculoskeletal: +joint pain, +limb pain, +pain with movement         Past Medical History:   Diagnosis Date    Abnormal Pap smear 2002    Colposcopy    ADHD (attention deficit hyperactivity disorder)     Anxiety     Depression      Past Surgical History:   Procedure Laterality Date    Breast augmentation      COLONOSCOPY N/A 2024    Procedure: COLONOSCOPY;  Surgeon: Andre Jones MD;  Location: [facility] ENDOSCOPY;  Service: Endoscopy;  Laterality: N/A;  ref by / maida inst portal-RB   precall complete-st    INDUCED   2001     Review of patient's allergies indicates:   Allergen Reactions    Neomycin-bacitracnzn-polymyxnb Rash    Neomycin-bacitracin-polymyxin Rash     Social  History     Social History Narrative    Born and raised in [place].    Moved to [facility] in 2005.    Graduate school at [employer].     for 6 years.    2 children; recently post partum 1/15/15     is an     Works as an artist.     Family History   Problem Relation Name Age of Onset    Colon cancer Paternal Grandmother  62    Valvular heart disease Father          s/p replacement    No Known Problems Mother      Breast cancer Paternal Aunt  58    Ovarian cancer Neg Hx      Diabetes Neg Hx      Hypertension Neg Hx      Stroke Neg Hx         Current Medications[1]    OBJECTIVE:      Vital Signs (Most Recent):  There were no vitals filed for this visit.  There is no height or weight on file to calculate BMI.    Physical Exam    Musculoskeletal: Able to make a fist.         Left Hand/Wrist Examination:    Observation/Inspection:  Swelling  left wrist, mild    Deformity  none  Discoloration  mild ecchymosis left wrist    Scars   none    Atrophy  None  Patient with tenderness palpation about the left distal radius    Neurovascular Exam:  Digits WWP, brisk CR < 3s throughout  NVI motor/LTS to M/R/U nerves, radial pulse 2+    ROM hand full, painless    ROM wrist full, painless    ROM elbow full, painless    Abdomen not guarded  Respirations nonlabored  Perfusion intact    Diagnostic Results:     Imaging - I independently viewed the patient's imaging as well as the radiology report.  Xrays of the patient's left wrist  demonstrate a nondisplaced distal radius fracture  EMG - none    ASSESSMENT/PLAN:      45 y.o. yo female with nondisplaced left distal radius fracture  Plan: The patient and I had a thorough discussion today.  We discussed the working diagnosis as well as several other potential alternative diagnoses.  Treatment options were discussed, both conservative and surgical.  Conservative treatment options would include things such as activity modifications, workplace modifications, a period of rest, oral vs  topical OTC and prescription anti-inflammatory medications, occupational therapy, splinting/bracing, immobilization, corticosteroid injections, and others.  Surgical options were discussed as well.     Assessment & Plan    PROCEDURES:  - # Procedures  - Apply cast to left wrist today.    FOLLOW UP:  - Follow up in 1 week for cast check and possible XRs.  - Follow up every few weeks for XRs to monitor healing.    PATIENT INSTRUCTIONS:  - Avoid lifting with the injured wrist.  - Move fingers and thumb as much as desired to prevent stiffness.  - Can use hand for light activities like typing.  - No weight-bearing on the wrist.  - Avoid yoga practice.         Should the patient's symptoms worsen, persist, or fail to improve they should return for reevaluation and I would be happy to see them back anytime.        Go Guerra M.D.    Please be aware that this note has been generated with the assistance of [employer] voice-to-text.  Please excuse any spelling or grammatical errors.    Thank you for choosing Dr. Go Guerra for your orthopedic hand and upper extremity care. It is our goal to provide you with exceptional care that will help keep you healthy, active, and get you back in the game.     If you felt that you received exemplary care today, please consider leaving feedback for Dr. Guerra on [email or web address] at [email or web address].    Please do not hesitate to reach out to us via email, phone, or MyChart with any questions, concerns, or feedback.           [1]   Current Outpatient Medications:     dextroamphetamine-amphetamine (ADDERALL XR) 15 MG 24 hr capsule, Take by mouth every morning., Disp: , Rfl:     dextroamphetamine-amphetamine (ADDERALL) 15 mg tablet, Take 1 tablet (15 mg total) by mouth 2 (two) times a day., Disp: 30 tablet, Rfl: 0    dextroamphetamine-amphetamine (ADDERALL) 15 mg tablet, Take 1 tablet by mouth 2 (two) times daily., Disp: , Rfl:     FLUoxetine 20 MG  capsule, Take 1 capsule (20 mg total) by mouth once daily., Disp: 90 capsule, Rfl: 1    norgestimate-ethinyl estradioL (TRI-LO-TERRELL) 0.18/0.215/0.25 mg-25 mcg tablet, Take 1 tablet by mouth once daily., Disp: , Rfl:     ondansetron (ZOFRAN-ODT) 4 MG TbDL, , Disp: , Rfl:     progesterone (PROMETRIUM) 100 MG capsule, Take 1 capsule (100 mg total) by mouth nightly., Disp: 30 capsule, Rfl: 6    clobetasol 0.05% (TEMOVATE) 0.05 % Oint, Apply topically 2 (two) times daily., Disp: 45 g, Rfl: 0

## 2025-04-17 NOTE — TELEPHONE ENCOUNTER
Spoke with pt regarding earlier appt for wrist fx, pt stated she was attempting to get appt in [place] as it is closer to her home. Informed pt if she needed anything from the [place] office to please reach out. Pt verbalized understanding.

## 2025-04-17 NOTE — TELEPHONE ENCOUNTER
----- Message from Deyanira sent at 4/17/2025  8:20 AM CDT -----  Regarding: SERGE TAPIA [[id number]]  Type:  Sooner Appointment Request  Patient is requesting a sooner appointment.  Name of Caller: SERGE TAPIA [[id number]] When is the first available appointment? 04/24/25   Symptoms: patient states that her cast uncomfortable and needs replacing and her elbow is in pain. Would the patient rather a call back or a response via [facility]? Call back  Best Call Back Number: [phone] Additional Information:  patient states that her cast uncomfortable and needs replacing and her elbow is in pain. please follow up with a sooner appointment for this patient as 04/24/25 is the soonest available, thanks!!  ----- Message -----  From: Deyanira Schmitt  Sent: 4/17/2025   8:24 AM CDT  To: Janelle BIRD Staff  Subject: SERGE TAPIA [[id number]]                         Type:  Sooner Appointment Request  Patient is requesting a sooner appointment.  Name of Caller: SERGE TAPIA [[id number]] When is the first available appointment? 04/24/25   Symptoms: patient states that her cast uncomfortable and needs replacing and her elbow is in pain. Would the patient rather a call back or a response via [facility]? Call back  Best Call Back Number: [phone] Additional Information:  patient states that her cast uncomfortable and needs replacing and her elbow is in pain. please follow up with a sooner appointment for this patient as this is the soonest available, thanks!!

## 2025-04-21 DIAGNOSIS — S52.522A TRAUMATIC CLOSED NONDISP METAPHYSEAL TORUS FRACTURE OF DISTAL RADIUS, LEFT, INITIAL ENCOUNTER: Primary | ICD-10-CM

## 2025-04-22 ENCOUNTER — OFFICE VISIT (OUTPATIENT)
Dept: ORTHOPEDICS | Facility: CLINIC | Age: 45
End: 2025-04-22
Payer: COMMERCIAL

## 2025-04-22 ENCOUNTER — HOSPITAL ENCOUNTER (OUTPATIENT)
Dept: RADIOLOGY | Facility: HOSPITAL | Age: 45
Discharge: HOME | End: 2025-04-22
Attending: ORTHOPAEDIC SURGERY
Payer: COMMERCIAL

## 2025-04-22 ENCOUNTER — TELEPHONE (OUTPATIENT)
Dept: ORTHOPEDICS | Facility: CLINIC | Age: 45
End: 2025-04-22
Payer: COMMERCIAL

## 2025-04-22 VITALS — BODY MASS INDEX: 25.03 KG/M2 | HEIGHT: 62 IN | WEIGHT: 136 LBS

## 2025-04-22 DIAGNOSIS — M25.532 LEFT WRIST PAIN: Primary | ICD-10-CM

## 2025-04-22 DIAGNOSIS — M25.532 LEFT WRIST PAIN: ICD-10-CM

## 2025-04-22 DIAGNOSIS — S52.522A TRAUMATIC CLOSED NONDISP METAPHYSEAL TORUS FRACTURE OF DISTAL RADIUS, LEFT, INITIAL ENCOUNTER: Primary | ICD-10-CM

## 2025-04-22 PROCEDURE — 99999 PR PBB SHADOW E&M-EST. PATIENT-LVL III: CPT | Mod: PBBFAC,,, | Performed by: ORTHOPAEDIC SURGERY

## 2025-04-22 PROCEDURE — 99213 OFFICE O/P EST LOW 20 MIN: CPT | Mod: S$GLB,,, | Performed by: ORTHOPAEDIC SURGERY

## 2025-04-22 PROCEDURE — 73110 X-RAY EXAM OF WRIST: CPT | Mod: 26,LT,, | Performed by: RADIOLOGY

## 2025-04-22 PROCEDURE — 73110 X-RAY EXAM OF WRIST: CPT | Mod: TC,LT

## 2025-04-22 PROCEDURE — 1159F MED LIST DOCD IN RCRD: CPT | Mod: CPTII,S$GLB,, | Performed by: ORTHOPAEDIC SURGERY

## 2025-04-22 PROCEDURE — 3008F BODY MASS INDEX DOCD: CPT | Mod: CPTII,S$GLB,, | Performed by: ORTHOPAEDIC SURGERY

## 2025-04-22 NOTE — TELEPHONE ENCOUNTER
----- Message from Reynaldo sent at 4/22/2025  8:05 AM CDT -----  Regarding: Cast  Contact: [phone]  Pt is calling in ref to having cast cut off in ER on 4/21 because it was too tight on a nerve. She is asking to be seen by provider to have it recast. There is a referral. Pt is presently in a splint. Patient Requesting Call Back @ [phone]

## 2025-04-22 NOTE — PROGRESS NOTES
[facility]  History & Physical  Orthopedics    SUBJECTIVE:      History of Present Illness    CHIEF COMPLAINT:  - Left wrist injury    HPI:  Stormy presents with left wrist pain following a roller skating accident two days ago. Initially thought to be a sprain, the pain felt different this time. Pain is most severe when moving the thumb and also present in the back of the wrist. She rates the pain as 7/10. She can move fingers without significant pain but has considerable discomfort with thumb movement.    She visited urgent care the night before, where they applied a large splint and referred for further evaluation. XRs taken at urgent care revealed a fracture in the radius bone, with visible crack lines in multiple views. She expresses concern about potentially needing a splint up to the elbow and reports difficulty sleeping with the current splint.    Interval history April 22, 2025: The patient returns today for re-evaluation.  She is now about 1 week status post a nondisplaced left distal radius fracture which is being treated closed.  She went to the ED last night because the cast was feeling too tight and it was causing some numbness in her hand.  It was removed and her numbness resolved quickly and she was placed into a plaster splint.  At this point in time she denies numbness or tingling and is feeling well with pain rated 3/10.  She returns for re-evaluation today with no other complaints.    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      ROS:  Constitutional: +sleep disturbances, +sleep difficulty  Musculoskeletal: +joint pain, +limb pain, +pain with movement         Past Medical History:   Diagnosis Date    Abnormal Pap smear 2002    Colposcopy    ADHD  (attention deficit hyperactivity disorder)     Anxiety     Depression      Past Surgical History:   Procedure Laterality Date    Breast augmentation      COLONOSCOPY N/A 2024    Procedure: COLONOSCOPY;  Surgeon: Andre Jones MD;  Location: [facility] ENDOSCOPY;  Service: Endoscopy;  Laterality: N/A;  ref by / [facility] inst portal-[facility]   precall complete-st    INDUCED   2001     Review of patient's allergies indicates:   Allergen Reactions    Neomycin-bacitracnzn-polymyxnb Rash    Neomycin-bacitracin-polymyxin Rash     Social History     Social History Narrative    Born and raised in [place].    Moved to [facility] in .    Graduate school at [employer].     for 6 years.    2 children; recently post partum 1/15/15     is an     Works as an artist.     Family History   Problem Relation Name Age of Onset    Colon cancer Paternal Grandmother  62    Valvular heart disease Father          s/p replacement    No Known Problems Mother      Breast cancer Paternal Aunt  58    Ovarian cancer Neg Hx      Diabetes Neg Hx      Hypertension Neg Hx      Stroke Neg Hx         [Current Medications]    [Current Medications]    Current Outpatient Medications:     dextroamphetamine-amphetamine (ADDERALL XR) 15 MG 24 hr capsule, Take by mouth every morning., Disp: , Rfl:     dextroamphetamine-amphetamine (ADDERALL) 15 mg tablet, Take 1 tablet (15 mg total) by mouth 2 (two) times a day., Disp: 30 tablet, Rfl: 0    dextroamphetamine-amphetamine (ADDERALL) 15 mg tablet, Take 1 tablet by mouth 2 (two) times daily., Disp: , Rfl:     FLUoxetine 20 MG capsule, Take 1 capsule (20 mg total) by mouth once daily., Disp: 90 capsule, Rfl: 1    norgestimate-ethinyl estradioL (TRI-LO-TERRELL) 0.18/0.215/0.25 mg-25 mcg tablet, Take 1 tablet by mouth once daily., Disp: , Rfl:     ondansetron (ZOFRAN-ODT) 4 MG TbDL, , Disp: , Rfl:     progesterone (PROMETRIUM) 100 MG capsule, Take 1 capsule (100 mg total) by mouth  nightly., Disp: 30 capsule, Rfl: 6    clobetasol 0.05% (TEMOVATE) 0.05 % Oint, Apply topically 2 (two) times daily., Disp: 45 g, Rfl: 0    OBJECTIVE:      Vital Signs (Most Recent):  There were no vitals filed for this visit.  There is no height or weight on file to calculate BMI.    Physical Exam    Musculoskeletal: Able to make a fist.         Left Hand/Wrist Examination:    Observation/Inspection:  Swelling  left wrist, mild    Deformity  none  Discoloration  mild ecchymosis left wrist    Scars   none    Atrophy  None  Patient with tenderness palpation about the left distal radius    Compartments of the left hand and forearm are completely soft       Neurovascular Exam:  Digits WWP, brisk CR < 3s throughout  NVI motor/LTS to M/R/U nerves, radial pulse 2+    ROM hand full, painless    ROM wrist full, painless    ROM elbow full, painless    Abdomen not guarded  Respirations nonlabored  Perfusion intact    Diagnostic Results:     Imaging - I independently viewed the patient's imaging as well as the radiology report.  Xrays of the patient's left wrist  demonstrate a nondisplaced distal radius fracture  EMG - none    ASSESSMENT/PLAN:      45 y.o. yo female with nondisplaced left distal radius fracture  Plan: The patient and I had a thorough discussion today.  We discussed the working diagnosis as well as several other potential alternative diagnoses.  Treatment options were discussed, both conservative and surgical.  Conservative treatment options would include things such as activity modifications, workplace modifications, a period of rest, oral vs topical OTC and prescription anti-inflammatory medications, occupational therapy, splinting/bracing, immobilization, corticosteroid injections, and others.  Surgical options were discussed as well.     Assessment & Plan    At this point in time, the patient and I discussed immobilization moving forward.  I offered her cast immobilization, plaster volar slab splint, and a  wrist brace.  She prefers a cast which I feel is reasonable.  She came in today earlier than scheduled over  what seems to have been a cast which was placed too tightly and was causing some numbness.  Fortunately after cast removal her numbness resolved and she is neurovascularly intact today.  Swelling is minimal.  We will replace her into a left short-arm cast and have her follow up in 2 weeks with new x-rays left wrist out of cast or sooner for any problems.    Should the patient's symptoms worsen, persist, or fail to improve they should return for reevaluation and I would be happy to see them back anytime.        Go Guerra M.D.    Please be aware that this note has been generated with the assistance of [employer] voice-to-text.  Please excuse any spelling or grammatical errors.    Thank you for choosing Dr. Go Guerra for your orthopedic hand and upper extremity care. It is our goal to provide you with exceptional care that will help keep you healthy, active, and get you back in the game.     If you felt that you received exemplary care today, please consider leaving feedback for Dr. Guerra on [employer] at [email or web address].    Please do not hesitate to reach out to us via email, phone, or MyChart with any questions, concerns, or feedback.

## 2025-04-24 ENCOUNTER — PATIENT MESSAGE (OUTPATIENT)
Dept: PSYCHIATRY | Facility: CLINIC | Age: 45
End: 2025-04-24
Payer: COMMERCIAL

## 2025-04-24 ENCOUNTER — OFFICE VISIT (OUTPATIENT)
Dept: PSYCHIATRY | Facility: CLINIC | Age: 45
End: 2025-04-24
Payer: COMMERCIAL

## 2025-04-24 DIAGNOSIS — F33.1 MODERATE EPISODE OF RECURRENT MAJOR DEPRESSIVE DISORDER: ICD-10-CM

## 2025-04-24 DIAGNOSIS — F90.0 ATTENTION DEFICIT HYPERACTIVITY DISORDER (ADHD), PREDOMINANTLY INATTENTIVE TYPE: Primary | Chronic | ICD-10-CM

## 2025-04-24 DIAGNOSIS — F41.1 GAD (GENERALIZED ANXIETY DISORDER): ICD-10-CM

## 2025-04-24 PROCEDURE — 98006 SYNCH AUDIO-VIDEO EST MOD 30: CPT | Mod: 95,,, | Performed by: NURSE PRACTITIONER

## 2025-04-24 PROCEDURE — G2211 COMPLEX E/M VISIT ADD ON: HCPCS | Mod: 95,,, | Performed by: NURSE PRACTITIONER

## 2025-04-24 RX ORDER — DEXTROAMPHETAMINE SACCHARATE, AMPHETAMINE ASPARTATE, DEXTROAMPHETAMINE SULFATE AND AMPHETAMINE SULFATE 3.75; 3.75; 3.75; 3.75 MG/1; MG/1; MG/1; MG/1
15 TABLET ORAL 2 TIMES DAILY
Qty: 60 TABLET | Refills: 0 | Status: SHIPPED | OUTPATIENT
Start: 2025-05-24 | End: 2025-06-23

## 2025-04-24 RX ORDER — DEXTROAMPHETAMINE SACCHARATE, AMPHETAMINE ASPARTATE, DEXTROAMPHETAMINE SULFATE AND AMPHETAMINE SULFATE 3.75; 3.75; 3.75; 3.75 MG/1; MG/1; MG/1; MG/1
15 TABLET ORAL 2 TIMES DAILY
Qty: 60 TABLET | Refills: 0 | Status: SHIPPED | OUTPATIENT
Start: 2025-04-24 | End: 2025-05-24

## 2025-04-24 NOTE — PROGRESS NOTES
Outpatient Psychiatry Follow-Up Visit (MD/NP)    4/24/2025      The patient location is:  [place]    The chief complaint leading to consultation is: inattention, depression and anxiety    Visit type: audio only    Time with patient:  37 minutes  40 minutes of total time spent on the encounter, which includes face to face time and non-face to face time preparing to see the patient (eg, review of tests), Obtaining and/or reviewing separately obtained history, Documenting clinical information in the electronic or other health record, Independently interpreting results (not separately reported) and communicating results to the patient/family/caregiver, or Care coordination (not separately reported).         Each patient to whom he or she provides medical services by telemedicine is:  (1) informed of the relationship between the physician and patient and the respective role of any other health care provider with respect to management of the patient; and (2) notified that he or she may decline to receive medical services by telemedicine and may withdraw from such care at any time.    Notes:         Clinical Status of Patient:  Outpatient (Ambulatory)    Chief Complaint:  Stormy Carlisle is a 45 y.o. female who presents today for follow-up of depression, anxiety and attention problems. She is taking  20 mg Prozac & adderall 15 mg BID for ADHD.    Current Psychiatric Medications:  Prozac 20 mg daily for mood  Adderall 15 mg PO BID  Xanax 0.25 mg po RPN (rarely takes for severe anxiety)    Interval History and Content of Current Session:    Patient presented for follow up today. She stated she broke her wrist roller skating with her daughter, her arm/hand is in a cast, but overall is healing well. She stated that she has been doing well since her last visit. She reported her children are also doing well too. She stated her children will be visiting her parents for the Summer. She shared her feelings about her relationship  "with her mother and her effective coping skills. She stated her youngest daughter still likes her new school. She stated she continues to work on her art and still enjoys it. She continues to report well managed anxiety with prozac 20 mg po daily and her effective coping strategies. She denied feeling depressed and when she feels stressed she continues to utilize healthy coping skills like swimming, gardening, art, and spending time with her kids. She described her mood as "good" and her affect was appropriate.    She continues to report adderall 15 mg po BID is still very helpful in improving her focus, attention, concentration, and productivity. She continues to report good sleep and appetite. She continues to report managed depression and anxiety with Prozac 20 mg po daily and coping strategies. She denied SE and AE including jitteriness, CP, or palpitations. She stated she takes xanax occasionally and only when she feels severely anxious or panicky.    She denied SI/HI/AVH. She denied suicidal ideation and no suicide plan or intent. She denied previous suicide attempts. She denied access to guns. She denied hopelessness, amotvation, or anhedonia. No objective signs or symptoms of jonny or psychosis. Patient VS are within normal range and no history of hypertension. She denied alcohol or drug abuse.     Past med trials: Wellbutrin (helped mood/motivation, but caused her to pass out, have decrease appetite, weight loss), Paxil was very hard to come off of and does not remember how much it helped. Prozac worked but was insufficiently helpful, venlafaxine (brain zaps and interfered with sleep) and vistaril (too groggy, sedating and made her feel tired that caused her to feel anxious and depressed)    Psychiatric Review Of Systems - Is patient experiencing or having changes in:  sleep: no  appetite: no  weight: no  energy/anergy: no  interest/pleasure/anhedonia: no  somatic symptoms: no  anxiety/panic: yes but " "manageable  guilty/hopelessness: no  concentration: yes Improved with adderall 15 mg po BID  S.I.B.s/risky behavior: no  Irritability: no  Racing thoughts: no  Impulsive behaviors: no  Paranoia:no  AVH:no  Suicidal thoughts/plan/intent: no  SE: no  ETOH: no  Drugs: no    Review of Systems     MEDICAL REVIEW OF SYSTEMS  History obtained from the patient  General : NO chills or fever  Eyes: NO  visual changes  ENT: NO  nasal discharge or sore throat  Endocrine: NO weight changes  Dermatological: NO rashes  Respiratory: NO cough, shortness of breath  Cardiovascular: NO chest pain, palpitations or racing heart  Gastrointestinal: NO nausea, vomiting, constipation or diarrhea  Musculoskeletal: NO muscle pain or stiffness  Neurological: NO , dizziness, headaches or tremors  Psychiatric: please see HPI        Past Medical, Family and Social History: The patient's past medical, family and social history have been reviewed and updated as appropriate within the electronic medical record - see encounter notes.  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 Has been through CBT but not recently. Med trials as above plus ambien per chart in 2015    Compliance: yes    Side effects: None    Risk Parameters:  Patient reports no suicidal ideation  Patient reports no homicidal ideation  Patient reports no self-injurious behavior  Patient reports no violent behavior    Exam (detailed: at least 9 elements; comprehensive: all 15 elements)   Constitutional  Vitals:  Most recent vital signs, dated greater than 90 days prior to this appointment, were reviewed.   There were no vitals filed for this visit.       General:  unremarkable, age appropriate     Musculoskeletal  Muscle Strength/Tone:  no dystonia, no tremor, no tic   Gait & Station:  Normal gait     Psychiatric  Speech:  no latency; no press, spontaneous,    Mood & Affect:  "Good"  Mood congruent and appropriate " and with full range   Thought Process:  normal and logical, logical   Associations:  intact   Thought Content:  normal, no suicidality, no homicidality, delusions, or paranoia   Insight:  intact, has awareness of illness   Judgement: behavior is adequate to circumstances, age appropriate   Orientation:  grossly intact   Memory: intact for content of interview, able to remember recent events- yes, able to remember remote events- yes   Language: grossly intact, able to name, able to repeat   Attention Span & Concentration:  able to focus with adderall   Fund of Knowledge:  intact and appropriate to age and level of education     Assessment and Diagnosis   Status/Progress: Based on the examination today, the patient's problem(s) is/are not well controlled.  New problems have not been presented today.   Co-morbidities, Diagnostic uncertainty and Lack of compliance are not complicating management of the primary condition.  There are no active rule-out diagnoses for this patient at this time.     General Impression: Doing well on her current medication regimen of adderall 15 mg po BID and prozac 20 mg po daily. No changes in dosages due to their effectiveness in treating her ADHD, depression, and anxiety.      ICD-10-CM ICD-9-CM   1. Attention deficit hyperactivity disorder (ADHD), predominantly inattentive type  F90.0 314.00   2. Moderate episode of recurrent major depressive disorder  F33.1 296.32   3. JAMEY (generalized anxiety disorder)  F41.1 300.02         Intervention/Counseling/Treatment Plan   In person visit 10/30/2024  Depression & Anxiety  Continue prozac 20 mg to help with depressive sx  Continue xanax 0.25 mg po PRN daily for severe anxiety and panic attacks (rarely takes it)  Informed pt of the risks of continuous Benzodiazepine use including tolerance, dependence, dizziness, drowsiness, memory problems, and withdrawals that may be life threatening upon abrupt cessation. Also advised not to take  Benzodiazepines with Opiates or other sedatives and also not to drive or operate heavy machinery while using Benzodiazepines. Avoid alcohol with Benzodiazapine. Patient agreed to take it and verbalized understanding  -We discussed risk of Serotonin Syndrome including symptoms in order of appearance: diarrhea, restlessness, extreme agitation, autonomic instability, hyperthermia, rigidity, coma, and death.  -Discussed black box warning of increased Suicidal thoughts in individuals younger than 24 years old and the steps to take if patient ever experiences suicide ideation (contact family, suicide hotline 988, call 911 or go the nearest emergency room       Reviewed patient's most recent vitals     ADHD, inattentive type  Continue Adderall IR 15 mg PO BID. Checked  and no signs of misuse.  Reviewed patient's vitals  Encouraged in-person visit      -Continue psychotherapy     Previously discussed risks, benefits, AE's (incl serotonin syndrome) SE's (including but not limited to GI sx, insomnia, agitation/anxiety/activation, appetite changes, HA, hypotension, others) inherent unpredictability of illness and treatment option    Discussed informed consent, diagnosis, risks and benefits of proposed treatment above vs alternative treatments vs no treatment, and potential side effects of these treatments. The patient expresses understanding of the above and displays the capacity to agree with this treatment given said understanding. Patient also agrees that, currently, the benefits outweigh the risks and would like to pursue treatment at this time. Answered all questions and discussed follow up. Encouraged patient to contact us with any questions or concerns.    Encouraged Patient to keep future appointment    Take medications as prescribed     Patient to present to Emergency Department for thoughts to harm herself or others      Return to Clinic: 3 months or sooner if needed    Gina Murphy  PMHNP-BC

## 2025-05-01 NOTE — PROGRESS NOTES
[facility]  History & Physical  Orthopedics    SUBJECTIVE:      History of Present Illness    CHIEF COMPLAINT:  - Left wrist injury    HPI:  Stormy presents with left wrist pain following a roller skating accident two days ago. Initially thought to be a sprain, the pain felt different this time. Pain is most severe when moving the thumb and also present in the back of the wrist. She rates the pain as 7/10. She can move fingers without significant pain but has considerable discomfort with thumb movement.    She visited urgent care the night before, where they applied a large splint and referred for further evaluation. XRs taken at urgent care revealed a fracture in the radius bone, with visible crack lines in multiple views. She expresses concern about potentially needing a splint up to the elbow and reports difficulty sleeping with the current splint.    Interval history April 22, 2025: The patient returns today for re-evaluation.  She is now about 1 week status post a nondisplaced left distal radius fracture which is being treated closed.  She went to the ED last night because the cast was feeling too tight and it was causing some numbness in her hand.  It was removed and her numbness resolved quickly and she was placed into a plaster splint.  At this point in time she denies numbness or tingling and is feeling well with pain rated 3/10.  She returns for re-evaluation today with no other complaints.    Interval history May 6, 2025: The patient returns today for re-evaluation.  She is now approximately 2 weeks out from a nondisplaced left distal radius fracture which has been treated closed.  She has remained immobilized in a short-arm fiberglass cast.  Overall, she reports her numbness and tingling that she was experiencing approximately 1 week ago has significantly resolved since placement of a new cast.  She has been utilizing ibuprofen for breakthrough pain, and she reports her pain as a  3/10 today.  She does note she is still experiencing some soreness on the dorsum of her left hand and with active range of motion of the left thumb.  She returns today for re-evaluation of her left wrist with no further complaints.    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      ROS:  Constitutional: +sleep disturbances, +sleep difficulty  Musculoskeletal: +joint pain, +limb pain, +pain with movement         Past Medical History:   Diagnosis Date    Abnormal Pap smear 2002    Colposcopy    ADHD (attention deficit hyperactivity disorder)     Anxiety     Depression      Past Surgical History:   Procedure Laterality Date    Breast augmentation      COLONOSCOPY N/A 2024    Procedure: COLONOSCOPY;  Surgeon: Andre Jones MD;  Location: [facility] ENDOSCOPY;  Service: Endoscopy;  Laterality: N/A;  ref by / [facility] inst portal-[facility]   precall complete-st    INDUCED   2001     Review of patient's allergies indicates:   Allergen Reactions    Neomycin-bacitracnzn-polymyxnb Rash    Neomycin-bacitracin-polymyxin Rash     Social History     Social History Narrative    Born and raised in [place].    Moved to [facility] in .    Graduate school at [employer].     for 6 years.    2 children; recently post partum 1/15/15     is an     Works as an artist.     Family History   Problem Relation Name Age of Onset    Colon cancer Paternal Grandmother  62    Valvular heart disease Father          s/p replacement    No Known Problems Mother      Breast cancer Paternal Aunt  58    Ovarian cancer Neg Hx      Diabetes Neg Hx      Hypertension Neg Hx      Stroke Neg Hx         [Current Medications]    [Current Medications]    Current Outpatient Medications:      dextroamphetamine-amphetamine (ADDERALL XR) 15 MG 24 hr capsule, Take by mouth every morning., Disp: , Rfl:     dextroamphetamine-amphetamine (ADDERALL) 15 mg tablet, Take 1 tablet (15 mg total) by mouth 2 (two) times a day., Disp: 30 tablet, Rfl: 0    dextroamphetamine-amphetamine (ADDERALL) 15 mg tablet, Take 1 tablet by mouth 2 (two) times daily., Disp: , Rfl:     FLUoxetine 20 MG capsule, Take 1 capsule (20 mg total) by mouth once daily., Disp: 90 capsule, Rfl: 1    norgestimate-ethinyl estradioL (TRI-LO-TERRELL) 0.18/0.215/0.25 mg-25 mcg tablet, Take 1 tablet by mouth once daily., Disp: , Rfl:     ondansetron (ZOFRAN-ODT) 4 MG TbDL, , Disp: , Rfl:     progesterone (PROMETRIUM) 100 MG capsule, Take 1 capsule (100 mg total) by mouth nightly., Disp: 30 capsule, Rfl: 6    clobetasol 0.05% (TEMOVATE) 0.05 % Oint, Apply topically 2 (two) times daily., Disp: 45 g, Rfl: 0    OBJECTIVE:      Vital Signs (Most Recent):  There were no vitals filed for this visit.  There is no height or weight on file to calculate BMI.    Physical Exam    Musculoskeletal: Able to make a fist.         Left Hand/Wrist Examination:    Observation/Inspection:  Swelling  left wrist, mild    Deformity  none  Discoloration  mild ecchymosis left wrist on the volar aspect of the distal forearm   Scars   none    Atrophy  None    Patient with tenderness palpation about the left distal radius  Patient with mild tenderness to palpation about the left radial styloid  Patient without tenderness to palpate the DRUJ  Patient with tenderness to palpate the radiocarpal joint    Neurovascular Exam:  Digits WWP, brisk CR < 3s throughout  NVI motor/LTS to M/R/U nerves, radial pulse 2+    ROM hand full, painless; she is able to make a full composite fist with the left hand with mild pain upon active thumb extension/abduction    ROM wrist full, painless    ROM elbow full, painless    Abdomen not guarded  Respirations nonlabored  Perfusion intact    Diagnostic  Results:     Imaging - I independently viewed the patient's imaging as well as the radiology report.  Xrays of the patient's left wrist  demonstrate a nondisplaced distal radius fracture without significant signs of interval healing.  No additional fractures or dislocations noted.    FINDINGS:  Wrist complete three views left.     There is a distal radial fracture good alignment no complication seen.       EMG - none    ASSESSMENT/PLAN:      45 y.o. yo female with nondisplaced left distal radius fracture    Plan: The patient and I had a thorough discussion today.  We discussed the working diagnosis as well as several other potential alternative diagnoses.  Treatment options were discussed, both conservative and surgical.  Conservative treatment options would include things such as activity modifications, workplace modifications, a period of rest, oral vs topical OTC and prescription anti-inflammatory medications, occupational therapy, splinting/bracing, immobilization, corticosteroid injections, and others.  Surgical options were discussed as well.     At this point in time, the patient is now approximately 2 weeks out from a nondisplaced left distal radius fracture.  She would like to continue with closed treatment of this fracture, and we will transition her to a left short-arm fiberglass cast at this time.  She is to keep this cast clean, dry, and intact until re-evaluation.  She is to remain nonweightbearing to left upper extremity.  She is to continue gentle range-of-motion to the digits of the left hand as tolerated.  She is to continue utilizing oral anti-inflammatories as needed for breakthrough pain.  She will follow up with our clinic in approximately 3 weeks with updated x-rays of the left wrist out of cast or sooner for any problems.  At that time, we can consider transitioning her to a short-arm Titan wrist brace.    Should the patient's symptoms worsen, persist, or fail to improve they should return  for reevaluation and I would be happy to see them back anytime.    Disclaimer:  This note is prepared using voice recognition software and as such is likely to have errors and has not been proof read. Please contact me for questions.     Leatha Carranza PA-C  Orthopedic/Hand Surgery

## 2025-05-05 ENCOUNTER — TELEPHONE (OUTPATIENT)
Dept: ORTHOPEDICS | Facility: CLINIC | Age: 45
End: 2025-05-05
Payer: COMMERCIAL

## 2025-05-06 ENCOUNTER — HOSPITAL ENCOUNTER (OUTPATIENT)
Dept: RADIOLOGY | Facility: HOSPITAL | Age: 45
Discharge: HOME | End: 2025-05-06
Payer: COMMERCIAL

## 2025-05-06 ENCOUNTER — OFFICE VISIT (OUTPATIENT)
Dept: ORTHOPEDICS | Facility: CLINIC | Age: 45
End: 2025-05-06
Payer: COMMERCIAL

## 2025-05-06 DIAGNOSIS — M25.532 LEFT WRIST PAIN: ICD-10-CM

## 2025-05-06 DIAGNOSIS — S52.522A TRAUMATIC CLOSED NONDISP METAPHYSEAL TORUS FRACTURE OF DISTAL RADIUS, LEFT, INITIAL ENCOUNTER: Primary | ICD-10-CM

## 2025-05-06 PROCEDURE — 1159F MED LIST DOCD IN RCRD: CPT | Mod: CPTII,S$GLB,,

## 2025-05-06 PROCEDURE — 73110 X-RAY EXAM OF WRIST: CPT | Mod: TC,LT

## 2025-05-06 PROCEDURE — 99213 OFFICE O/P EST LOW 20 MIN: CPT | Mod: S$GLB,,,

## 2025-05-06 PROCEDURE — 1160F RVW MEDS BY RX/DR IN RCRD: CPT | Mod: CPTII,S$GLB,,

## 2025-05-06 PROCEDURE — 73110 X-RAY EXAM OF WRIST: CPT | Mod: 26,LT,, | Performed by: RADIOLOGY

## 2025-05-06 PROCEDURE — 99999 PR PBB SHADOW E&M-EST. PATIENT-LVL III: CPT | Mod: PBBFAC,,,

## 2025-05-08 ENCOUNTER — OFFICE VISIT (OUTPATIENT)
Dept: ORTHOPEDICS | Facility: CLINIC | Age: 45
End: 2025-05-08
Payer: COMMERCIAL

## 2025-05-08 ENCOUNTER — TELEPHONE (OUTPATIENT)
Dept: ORTHOPEDICS | Facility: CLINIC | Age: 45
End: 2025-05-08
Payer: COMMERCIAL

## 2025-05-08 DIAGNOSIS — M25.532 LEFT WRIST PAIN: ICD-10-CM

## 2025-05-08 DIAGNOSIS — S52.522D CLOSED TORUS FRACTURE OF DISTAL END OF LEFT RADIUS WITH ROUTINE HEALING, SUBSEQUENT ENCOUNTER: Primary | ICD-10-CM

## 2025-05-08 PROCEDURE — 1159F MED LIST DOCD IN RCRD: CPT | Mod: CPTII,S$GLB,,

## 2025-05-08 PROCEDURE — 99999 PR PBB SHADOW E&M-EST. PATIENT-LVL II: CPT | Mod: PBBFAC,,,

## 2025-05-08 PROCEDURE — 99214 OFFICE O/P EST MOD 30 MIN: CPT | Mod: S$GLB,,,

## 2025-05-08 PROCEDURE — 1160F RVW MEDS BY RX/DR IN RCRD: CPT | Mod: CPTII,S$GLB,,

## 2025-05-08 NOTE — TELEPHONE ENCOUNTER
Spoke with patient about coming in for OV. She stated she don't know if she can come today with her kids school schedule and will give me a call back when she's able to.      ----- Message from Med Assistant Hernández sent at 5/8/2025  9:15 AM CDT -----  Regarding: pt advice  Contact: pt [phone]  Type:  Needs Medical AdviceWho Called: barrington stated she have cast on her  finger stated that she is  having some discoloration on her  left wrist and  thumb she thinks the cast  maybe too tight Would the patient rather a call back or a response via [facility]?  Call back Best Call Back Number: pt [phone] Additional Information:

## 2025-05-08 NOTE — PROGRESS NOTES
[facility]  History & Physical  Orthopedics    SUBJECTIVE:      History of Present Illness    CHIEF COMPLAINT:  - Left wrist injury    HPI:  Stormy presents with left wrist pain following a roller skating accident two days ago. Initially thought to be a sprain, the pain felt different this time. Pain is most severe when moving the thumb and also present in the back of the wrist. She rates the pain as 7/10. She can move fingers without significant pain but has considerable discomfort with thumb movement.    She visited urgent care the night before, where they applied a large splint and referred for further evaluation. XRs taken at urgent care revealed a fracture in the radius bone, with visible crack lines in multiple views. She expresses concern about potentially needing a splint up to the elbow and reports difficulty sleeping with the current splint.    Interval history April 22, 2025: The patient returns today for re-evaluation.  She is now about 1 week status post a nondisplaced left distal radius fracture which is being treated closed.  She went to the ED last night because the cast was feeling too tight and it was causing some numbness in her hand.  It was removed and her numbness resolved quickly and she was placed into a plaster splint.  At this point in time she denies numbness or tingling and is feeling well with pain rated 3/10.  She returns for re-evaluation today with no other complaints.    Interval history May 6, 2025: The patient returns today for re-evaluation.  She is now approximately 2 weeks out from a nondisplaced left distal radius fracture which has been treated closed.  She has remained immobilized in a short-arm fiberglass cast.  Overall, she reports her numbness and tingling that she was experiencing approximately 1 week ago has significantly resolved since placement of a new cast.  She has been utilizing ibuprofen for breakthrough pain, and she reports her pain as a  3/10 today.  She does note she is still experiencing some soreness on the dorsum of her left hand and with active range of motion of the left thumb.  She returns today for re-evaluation of her left wrist with no further complaints.    Interval history May 8, 2025: The patient returns today for reevaluation. She is now approximately 2 weeks and 4 days out from initial injury. She was evaluated earlier this week, and she was immobilized in a left short-arm fiberglass plaster cast. She presents prematurely today for her follow up as she noticed increased discoloration to left hand last night, and she also noticed significant tightness to the dorsal proximal forearm where the short-arm fiberglass cast was applied.  She reports her pain is a 2/10 today accompanied by mild bruising noted to the distal forearm of the left volar wrist.  She denies new numbness and new tingling. She is utilizing ibuprofen 400 mg for break through pain.  She presents today for re-evaluation of her left wrist and for cast re-application with no further complaints.    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      ROS:  Constitutional: +sleep disturbances, +sleep difficulty  Musculoskeletal: +joint pain, +limb pain, +pain with movement         Past Medical History:   Diagnosis Date    Abnormal Pap smear 2002    Colposcopy    ADHD (attention deficit hyperactivity disorder)     Anxiety     Depression      Past Surgical History:   Procedure Laterality Date    Breast augmentation      COLONOSCOPY N/A 11/13/2024    Procedure: COLONOSCOPY;  Surgeon: Andre Jones MD;  Location: [facility] ENDOSCOPY;  Service: Endoscopy;  Laterality: N/A;  ref by / [facility] inst portal-[facility]  11/6 precall complete-st     INDUCED   2001     Review of patient's allergies indicates:   Allergen Reactions    Neomycin-bacitracnzn-polymyxnb Rash    Neomycin-bacitracin-polymyxin Rash     Social History     Social History Narrative    Born and raised in [place].    Moved to [facility] in .    Graduate school at [employer].     for 6 years.    2 children; recently post partum 1/15/15     is an     Works as an artist.     Family History   Problem Relation Name Age of Onset    Colon cancer Paternal Grandmother  62    Valvular heart disease Father          s/p replacement    No Known Problems Mother      Breast cancer Paternal Aunt  58    Ovarian cancer Neg Hx      Diabetes Neg Hx      Hypertension Neg Hx      Stroke Neg Hx         [Current Medications]    [Current Medications]    Current Outpatient Medications:     dextroamphetamine-amphetamine (ADDERALL XR) 15 MG 24 hr capsule, Take by mouth every morning., Disp: , Rfl:     dextroamphetamine-amphetamine (ADDERALL) 15 mg tablet, Take 1 tablet (15 mg total) by mouth 2 (two) times a day., Disp: 30 tablet, Rfl: 0    dextroamphetamine-amphetamine (ADDERALL) 15 mg tablet, Take 1 tablet by mouth 2 (two) times daily., Disp: , Rfl:     FLUoxetine 20 MG capsule, Take 1 capsule (20 mg total) by mouth once daily., Disp: 90 capsule, Rfl: 1    norgestimate-ethinyl estradioL (TRI-LO-TERRELL) 0.18/0.215/0.25 mg-25 mcg tablet, Take 1 tablet by mouth once daily., Disp: , Rfl:     ondansetron (ZOFRAN-ODT) 4 MG TbDL, , Disp: , Rfl:     progesterone (PROMETRIUM) 100 MG capsule, Take 1 capsule (100 mg total) by mouth nightly., Disp: 30 capsule, Rfl: 6    clobetasol 0.05% (TEMOVATE) 0.05 % Oint, Apply topically 2 (two) times daily., Disp: 45 g, Rfl: 0    OBJECTIVE:      Vital Signs (Most Recent):  There were no vitals filed for this visit.  There is no height or weight on file to calculate BMI.    Physical Exam    Musculoskeletal: Able to make a fist.         Left Hand/Wrist  Examination:    Observation/Inspection:  Swelling  left wrist, mild    Deformity  no notable signs of abrasions or blistering from the cast  Discoloration  mild ecchymosis left wrist on the volar aspect of the distal forearm which is similar to her most recent visit ecchymosis appearence   Scars   none    Atrophy  None    Patient with tenderness palpation about the left distal radius  Patient with mild tenderness to palpation about the left radial styloid  Patient without tenderness to palpate the DRUJ  Patient with tenderness to palpate the radiocarpal joint  Compartments of the left hand and forearm are completely soft     Neurovascular Exam:  Digits WWP, brisk CR < 3s throughout  NVI motor/LTS to M/R/U nerves, radial pulse 2+    ROM hand full, painless; she is able to make a full composite fist with the left hand with mild pain upon active thumb extension/abduction    ROM wrist limited and painful due to known injury    ROM elbow full, painless    Abdomen not guarded  Respirations nonlabored  Perfusion intact    Diagnostic Results:     Imaging - I independently viewed the patient's imaging as well as the radiology report.  Xrays of the patient's left wrist  demonstrate a nondisplaced distal radius fracture without significant signs of interval healing.  No additional fractures or dislocations noted.    FINDINGS:  Wrist complete three views left.     There is a distal radial fracture good alignment no complication seen.       EMG - none    ASSESSMENT/PLAN:      45 y.o. yo female with nondisplaced left distal radius fracture    Plan: The patient and I had a thorough discussion today.  We discussed the working diagnosis as well as several other potential alternative diagnoses.  Treatment options were discussed, both conservative and surgical.  Conservative treatment options would include things such as activity modifications, workplace modifications, a period of rest, oral vs topical OTC and prescription  anti-inflammatory medications, occupational therapy, splinting/bracing, immobilization, corticosteroid injections, and others.  Surgical options were discussed as well.     At this point in time, the patient is now approximately 2 weeks and 4 days out from a nondisplaced left distal radius fracture.  We will continue with closed treatment of this fracture, and we transition her from a left short-arm fiberglass cast at this time to a left short-arm Exos as she has been unable to tolerate a left short-arm fiberglass cast.  She is to keep this Exos on 24/7.  She is to keep the Exos clean, dry, and intact until re-evaluation.  She is to continue gentle range-of-motion to the left hand.  She is to remain nonweightbearing to left upper extremity at this time.  She will follow up in approximately 2-3 weeks with updated x-rays of the left wrist out of Exos or sooner for any problems.    Should the patient's symptoms worsen, persist, or fail to improve they should return for reevaluation and I would be happy to see them back anytime.    Disclaimer:  This note is prepared using voice recognition software and as such is likely to have errors and has not been proof read. Please contact me for questions.     Leatha Carranza PA-C  Orthopedic/Hand Surgery

## 2025-05-26 ENCOUNTER — TELEPHONE (OUTPATIENT)
Dept: ORTHOPEDICS | Facility: CLINIC | Age: 45
End: 2025-05-26
Payer: COMMERCIAL

## 2025-05-27 ENCOUNTER — OFFICE VISIT (OUTPATIENT)
Dept: ORTHOPEDICS | Facility: CLINIC | Age: 45
End: 2025-05-27
Payer: COMMERCIAL

## 2025-05-27 ENCOUNTER — HOSPITAL ENCOUNTER (OUTPATIENT)
Dept: RADIOLOGY | Facility: HOSPITAL | Age: 45
Discharge: HOME | End: 2025-05-27
Payer: COMMERCIAL

## 2025-05-27 DIAGNOSIS — M25.532 LEFT WRIST PAIN: ICD-10-CM

## 2025-05-27 DIAGNOSIS — S52.522D CLOSED TORUS FRACTURE OF DISTAL END OF LEFT RADIUS WITH ROUTINE HEALING, SUBSEQUENT ENCOUNTER: Primary | ICD-10-CM

## 2025-05-27 PROCEDURE — 73110 X-RAY EXAM OF WRIST: CPT | Mod: 26,LT,, | Performed by: RADIOLOGY

## 2025-05-27 PROCEDURE — 99213 OFFICE O/P EST LOW 20 MIN: CPT | Mod: S$GLB,,,

## 2025-05-27 PROCEDURE — 73110 X-RAY EXAM OF WRIST: CPT | Mod: TC,LT

## 2025-05-27 PROCEDURE — 99999 PR PBB SHADOW E&M-EST. PATIENT-LVL III: CPT | Mod: PBBFAC,,,

## 2025-05-27 PROCEDURE — 1159F MED LIST DOCD IN RCRD: CPT | Mod: CPTII,S$GLB,,

## 2025-05-27 PROCEDURE — 1160F RVW MEDS BY RX/DR IN RCRD: CPT | Mod: CPTII,S$GLB,,

## 2025-05-27 NOTE — PROGRESS NOTES
[facility]  History & Physical  Orthopedics    SUBJECTIVE:    Dr. Guerra is a supervising physician for this patient/encounter    History of Present Illness    CHIEF COMPLAINT:  - Left wrist injury    HPI:  Stormy presents with left wrist pain following a roller skating accident two days ago. Initially thought to be a sprain, the pain felt different this time. Pain is most severe when moving the thumb and also present in the back of the wrist. She rates the pain as 7/10. She can move fingers without significant pain but has considerable discomfort with thumb movement.    She visited urgent care the night before, where they applied a large splint and referred for further evaluation. XRs taken at urgent care revealed a fracture in the radius bone, with visible crack lines in multiple views. She expresses concern about potentially needing a splint up to the elbow and reports difficulty sleeping with the current splint.    Interval history April 22, 2025: The patient returns today for re-evaluation.  She is now about 1 week status post a nondisplaced left distal radius fracture which is being treated closed.  She went to the ED last night because the cast was feeling too tight and it was causing some numbness in her hand.  It was removed and her numbness resolved quickly and she was placed into a plaster splint.  At this point in time she denies numbness or tingling and is feeling well with pain rated 3/10.  She returns for re-evaluation today with no other complaints.    Interval history May 6, 2025: The patient returns today for re-evaluation.  She is now approximately 2 weeks out from a nondisplaced left distal radius fracture which has been treated closed.  She has remained immobilized in a short-arm fiberglass cast.  Overall, she reports her numbness and tingling that she was experiencing approximately 1 week ago has significantly resolved since placement of a new cast.  She has been utilizing  ibuprofen for breakthrough pain, and she reports her pain as a 3/10 today.  She does note she is still experiencing some soreness on the dorsum of her left hand and with active range of motion of the left thumb.  She returns today for re-evaluation of her left wrist with no further complaints.    Interval history May 8, 2025: The patient returns today for reevaluation. She is now approximately 2 weeks and 4 days out from initial injury. She was evaluated earlier this week, and she was immobilized in a left short-arm fiberglass plaster cast. She presents prematurely today for her follow up as she noticed increased discoloration to left hand last night, and she also noticed significant tightness to the dorsal proximal forearm where the short-arm fiberglass cast was applied.  She reports her pain is a 2/10 today accompanied by mild bruising noted to the distal forearm of the left volar wrist.  She denies new numbness and new tingling. She is utilizing ibuprofen 400 mg for break through pain.  She presents today for re-evaluation of her left wrist and for cast re-application with no further complaints.    Interval history May 27, 2025: The patient returns today for re-evaluation of her left wrist.  She is now approximately 5 weeks and 2 days out from her left distal radius fracture. She has remained immobilized in a left short arm titan exos since her last clinic visit.  Overall, the patient reports she is doing extremely well, and she reports minimal pain.  She reports a 1/10 pain accompanied by slight soreness to the left wrist.  She denies use of pain medications, new numbness, and new tingling.  She presents today for re-evaluation of her left wrist with no further complaints.    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    ROS:  Constitutional: +sleep disturbances, +sleep difficulty  Musculoskeletal: +joint pain, +limb pain, +pain with movement     Past Medical History:   Diagnosis Date    Abnormal Pap smear 2002    Colposcopy    ADHD (attention deficit hyperactivity disorder)     Anxiety     Depression      Past Surgical History:   Procedure Laterality Date    Breast augmentation      COLONOSCOPY N/A 2024    Procedure: COLONOSCOPY;  Surgeon: Andre Jones MD;  Location: [facility] ENDOSCOPY;  Service: Endoscopy;  Laterality: N/A;  ref by / maida inst portal-RB   precall complete-st    INDUCED   2001     Review of patient's allergies indicates:   Allergen Reactions    Neomycin-bacitracnzn-polymyxnb Rash    Neomycin-bacitracin-polymyxin Rash     Social History     Social History Narrative    Born and raised in [place].    Moved to [facility] in .    Graduate school at [employer].     for 6 years.    2 children; recently post partum 1/15/15     is an     Works as an artist.     Family History   Problem Relation Name Age of Onset    Colon cancer Paternal Grandmother  62    Valvular heart disease Father          s/p replacement    No Known Problems Mother      Breast cancer Paternal Aunt  58    Ovarian cancer Neg Hx      Diabetes Neg Hx      Hypertension Neg Hx      Stroke Neg Hx         [Current Medications]    [Current Medications]    Current Outpatient Medications:     dextroamphetamine-amphetamine (ADDERALL XR) 15 MG 24 hr capsule, Take by mouth every morning., Disp: , Rfl:     dextroamphetamine-amphetamine (ADDERALL) 15 mg tablet, Take 1 tablet (15 mg total) by mouth 2 (two) times a day., Disp: 30 tablet, Rfl: 0    dextroamphetamine-amphetamine (ADDERALL) 15 mg tablet, Take 1 tablet by mouth 2 (two) times daily., Disp: , Rfl:     FLUoxetine 20 MG capsule, Take 1 capsule (20 mg total) by mouth once daily., Disp: 90 capsule, Rfl: 1     norgestimate-ethinyl estradioL (TRI-LO-TERRELL) 0.18/0.215/0.25 mg-25 mcg tablet, Take 1 tablet by mouth once daily., Disp: , Rfl:     ondansetron (ZOFRAN-ODT) 4 MG TbDL, , Disp: , Rfl:     progesterone (PROMETRIUM) 100 MG capsule, Take 1 capsule (100 mg total) by mouth nightly., Disp: 30 capsule, Rfl: 6    clobetasol 0.05% (TEMOVATE) 0.05 % Oint, Apply topically 2 (two) times daily., Disp: 45 g, Rfl: 0    OBJECTIVE:      Vital Signs (Most Recent):  There were no vitals filed for this visit.  There is no height or weight on file to calculate BMI.    Physical Exam    Musculoskeletal: Able to make a fist.         Left Hand/Wrist Examination:    Observation/Inspection:  Swelling  left wrist, mild    Deformity  notable dry skin to the left distal forearm  Discoloration  none  Scars   none    Atrophy  None    Patient with tenderness to palpation about the left distal radius and radiocarpal joint  Patient with mild tenderness to palpation about the left radial styloid  Patient without tenderness to palpate the DRUJ    Neurovascular Exam:  Digits WWP, brisk CR < 3s throughout  NVI motor/LTS to M/R/U nerves, radial pulse 2+    ROM hand full, painless; she is able to make a full composite fist with the left hand without pain    ROM wrist mildly limited and painless; wrist extension and wrist flexion are near full    ROM elbow full, painless    Abdomen not guarded  Respirations nonlabored  Perfusion intact    Diagnostic Results:     Imaging - I independently viewed the patient's imaging as well as the radiology report.  Xrays of the patient's left wrist  demonstrate a nondisplaced distal radius fracture without significant signs of interval healing.  No additional fractures or dislocations noted.    FINDINGS:  Healing comminuted nondisplaced fracture distal radius, stable position alignment.  Remaining post traumatic subcutaneous edema.       EMG - none    ASSESSMENT/PLAN:      45 y.o. yo female with nondisplaced left distal  radius fracture    Plan: The patient and I had a thorough discussion today.  We discussed the working diagnosis as well as several other potential alternative diagnoses.  Treatment options were discussed, both conservative and surgical.  Conservative treatment options would include things such as activity modifications, workplace modifications, a period of rest, oral vs topical OTC and prescription anti-inflammatory medications, occupational therapy, splinting/bracing, immobilization, corticosteroid injections, and others.  Surgical options were discussed as well.     At this point in time, the patient is now approximately 5 weeks and 2 days out from a nondisplaced left distal radius fracture. we will transition her from a left short-arm Exos to a left short-arm Titan wrist brace.  Wrist brace provided today.  The patient is to wear this wrist brace when leaving the home.  However, she may discontinue the brace at night.  She is to continue nonweightbearing precautions of the left upper extremity.  Further, she is to continue gentle range of motion of the digits of the left hand.  She is to continue oral anti-inflammatories as needed for breakthrough pain.  She may utilize bacitracin to the skin irritation on the distal forearm.  She will follow up in approximately 2-3 weeks with updated x-rays of the left wrist out of brace or sooner for any problems.  At that time, we will discontinue her brace altogether, and order occupational therapy for gentle range of motion of the left hand.    Should the patient's symptoms worsen, persist, or fail to improve they should return for reevaluation and I would be happy to see them back anytime.    Disclaimer:  This note is prepared using voice recognition software and as such is likely to have errors and has not been proof read. Please contact me for questions.     Leatha Carranza PA-C  Orthopedic/Hand Surgery

## 2025-06-16 ENCOUNTER — PATIENT MESSAGE (OUTPATIENT)
Dept: ORTHOPEDICS | Facility: CLINIC | Age: 45
End: 2025-06-16
Payer: COMMERCIAL

## 2025-06-17 ENCOUNTER — OFFICE VISIT (OUTPATIENT)
Dept: ORTHOPEDICS | Facility: CLINIC | Age: 45
End: 2025-06-17
Payer: COMMERCIAL

## 2025-06-17 ENCOUNTER — PATIENT MESSAGE (OUTPATIENT)
Dept: ORTHOPEDICS | Facility: CLINIC | Age: 45
End: 2025-06-17

## 2025-06-17 ENCOUNTER — HOSPITAL ENCOUNTER (OUTPATIENT)
Dept: RADIOLOGY | Facility: HOSPITAL | Age: 45
Discharge: HOME | End: 2025-06-17
Payer: COMMERCIAL

## 2025-06-17 DIAGNOSIS — M25.532 LEFT WRIST PAIN: ICD-10-CM

## 2025-06-17 DIAGNOSIS — S52.522D CLOSED TORUS FRACTURE OF DISTAL END OF LEFT RADIUS WITH ROUTINE HEALING, SUBSEQUENT ENCOUNTER: Primary | ICD-10-CM

## 2025-06-17 PROCEDURE — 99999 PR PBB SHADOW E&M-EST. PATIENT-LVL III: CPT | Mod: PBBFAC,,,

## 2025-06-17 PROCEDURE — 73110 X-RAY EXAM OF WRIST: CPT | Mod: 26,LT,, | Performed by: RADIOLOGY

## 2025-06-17 PROCEDURE — 1159F MED LIST DOCD IN RCRD: CPT | Mod: CPTII,S$GLB,,

## 2025-06-17 PROCEDURE — 99214 OFFICE O/P EST MOD 30 MIN: CPT | Mod: S$GLB,,,

## 2025-06-17 PROCEDURE — 73110 X-RAY EXAM OF WRIST: CPT | Mod: TC,LT

## 2025-06-17 NOTE — PROGRESS NOTES
[facility]  History & Physical  Orthopedics    SUBJECTIVE:    Dr. Guerra is a supervising physician for this patient/encounter    History of Present Illness    CHIEF COMPLAINT:  - Left wrist injury    HPI:  Stormy presents with left wrist pain following a roller skating accident two days ago. Initially thought to be a sprain, the pain felt different this time. Pain is most severe when moving the thumb and also present in the back of the wrist. She rates the pain as 7/10. She can move fingers without significant pain but has considerable discomfort with thumb movement.    She visited urgent care the night before, where they applied a large splint and referred for further evaluation. XRs taken at urgent care revealed a fracture in the radius bone, with visible crack lines in multiple views. She expresses concern about potentially needing a splint up to the elbow and reports difficulty sleeping with the current splint.    Interval history April 22, 2025: The patient returns today for re-evaluation.  She is now about 1 week status post a nondisplaced left distal radius fracture which is being treated closed.  She went to the ED last night because the cast was feeling too tight and it was causing some numbness in her hand.  It was removed and her numbness resolved quickly and she was placed into a plaster splint.  At this point in time she denies numbness or tingling and is feeling well with pain rated 3/10.  She returns for re-evaluation today with no other complaints.    Interval history May 6, 2025: The patient returns today for re-evaluation.  She is now approximately 2 weeks out from a nondisplaced left distal radius fracture which has been treated closed.  She has remained immobilized in a short-arm fiberglass cast.  Overall, she reports her numbness and tingling that she was experiencing approximately 1 week ago has significantly resolved since placement of a new cast.  She has been utilizing  ibuprofen for breakthrough pain, and she reports her pain as a 3/10 today.  She does note she is still experiencing some soreness on the dorsum of her left hand and with active range of motion of the left thumb.  She returns today for re-evaluation of her left wrist with no further complaints.    Interval history May 8, 2025: The patient returns today for reevaluation. She is now approximately 2 weeks and 4 days out from initial injury. She was evaluated earlier this week, and she was immobilized in a left short-arm fiberglass plaster cast. She presents prematurely today for her follow up as she noticed increased discoloration to left hand last night, and she also noticed significant tightness to the dorsal proximal forearm where the short-arm fiberglass cast was applied.  She reports her pain is a 2/10 today accompanied by mild bruising noted to the distal forearm of the left volar wrist.  She denies new numbness and new tingling. She is utilizing ibuprofen 400 mg for break through pain.  She presents today for re-evaluation of her left wrist and for cast re-application with no further complaints.    Interval history May 27, 2025: The patient returns today for re-evaluation of her left wrist.  She is now approximately 5 weeks and 2 days out from her left distal radius fracture. She has remained immobilized in a left short arm titan exos since her last clinic visit.  Overall, the patient reports she is doing extremely well, and she reports minimal pain.  She reports a 1/10 pain accompanied by slight soreness to the left wrist.  She denies use of pain medications, new numbness, and new tingling.  She presents today for re-evaluation of her left wrist with no further complaints.    Interval history June 17, 2025: The patient returns today for re-evaluation of her left wrist.  She is now approximately 8 weeks and 2 days out from her initial left distal radius fracture.  She has remained immobilized in a left short-arm  Titan wrist brace since her last clinic visit.  She reports a 2/10 pain today with the exception of certain movements such as opening the door and gripping objects. 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    ROS:  Constitutional: +sleep disturbances, +sleep difficulty  Musculoskeletal: +joint pain, +limb pain, +pain with movement     Past Medical History:   Diagnosis Date    Abnormal Pap smear     Colposcopy    ADHD (attention deficit hyperactivity disorder)     Anxiety     Depression      Past Surgical History:   Procedure Laterality Date    Breast augmentation      COLONOSCOPY N/A 2024    Procedure: COLONOSCOPY;  Surgeon: Andre Jones MD;  Location: [facility] ENDOSCOPY;  Service: Endoscopy;  Laterality: N/A;  ref by / maida inst portal-RB   precall complete-st    INDUCED   2001     Review of patient's allergies indicates:   Allergen Reactions    Neomycin-bacitracnzn-polymyxnb Rash    Neomycin-bacitracin-polymyxin Rash     Social History     Social History Narrative    Born and raised in [place].    Moved to [facility] in .    Graduate school at [employer].     for 6 years.    2 children; recently post partum 1/15/15     is an     Works as an artist.     Family History   Problem Relation Name Age of Onset    Colon cancer Paternal Grandmother  62    Valvular heart disease Father          s/p replacement    No Known Problems Mother      Breast cancer Paternal Aunt  58    Ovarian cancer Neg Hx      Diabetes Neg Hx      Hypertension Neg Hx      Stroke Neg Hx         [Current Medications]    [Current Medications]    Current Outpatient  Medications:     dextroamphetamine-amphetamine (ADDERALL XR) 15 MG 24 hr capsule, Take by mouth every morning., Disp: , Rfl:     dextroamphetamine-amphetamine (ADDERALL) 15 mg tablet, Take 1 tablet (15 mg total) by mouth 2 (two) times a day., Disp: 30 tablet, Rfl: 0    dextroamphetamine-amphetamine (ADDERALL) 15 mg tablet, Take 1 tablet by mouth 2 (two) times daily., Disp: , Rfl:     FLUoxetine 20 MG capsule, Take 1 capsule (20 mg total) by mouth once daily., Disp: 90 capsule, Rfl: 1    norgestimate-ethinyl estradioL (TRI-LO-TERRELL) 0.18/0.215/0.25 mg-25 mcg tablet, Take 1 tablet by mouth once daily., Disp: , Rfl:     ondansetron (ZOFRAN-ODT) 4 MG TbDL, , Disp: , Rfl:     progesterone (PROMETRIUM) 100 MG capsule, Take 1 capsule (100 mg total) by mouth nightly., Disp: 30 capsule, Rfl: 6    clobetasol 0.05% (TEMOVATE) 0.05 % Oint, Apply topically 2 (two) times daily., Disp: 45 g, Rfl: 0    OBJECTIVE:      Vital Signs (Most Recent):  There were no vitals filed for this visit.  There is no height or weight on file to calculate BMI.    Physical Exam    Musculoskeletal: Able to make a fist.         Left Hand/Wrist Examination:    Observation/Inspection:  Swelling  left wrist, mild    Deformity  none  Discoloration  none  Scars   none    Atrophy  None    Patient with mild tenderness to palpation about the left distal radius and radiocarpal joint  Patient with mild tenderness to palpation about the left radial styloid  Patient without tenderness to palpate the DRUJ    Neurovascular Exam:  Digits WWP, brisk CR < 3s throughout  NVI motor/LTS to M/R/U nerves, radial pulse 2+    ROM hand full, painless; she is able to make a full composite fist with the left hand without pain    ROM wrist mildly limited and painless; wrist extension and wrist flexion are near full but mildly restricted due to soreness    ROM elbow full, painless    Abdomen not guarded  Respirations nonlabored  Perfusion intact    Diagnostic Results:     Imaging -  I independently viewed the patient's imaging as well as the radiology report.  Xrays of the patient's left wrist demonstrate a nondisplaced distal radius fracture with signs of interval healing.  No additional fractures or dislocations noted.    EMG - none    ASSESSMENT/PLAN:      45 y.o. yo female with nondisplaced left distal radius fracture x 8 weeks out from initial injury     Plan: The patient and I had a thorough discussion today.  We discussed the working diagnosis as well as several other potential alternative diagnoses.  Treatment options were discussed, both conservative and surgical.  Conservative treatment options would include things such as activity modifications, workplace modifications, a period of rest, oral vs topical OTC and prescription anti-inflammatory medications, occupational therapy, splinting/bracing, immobilization, corticosteroid injections, and others.  Surgical options were discussed as well.     At this point in time, the patient is now approximately 8 weeks out from a nondisplaced left distal radius fracture that was treated nonoperatively.  She may discontinue all forms of immobilization at this time.  She may begin gentle range-of-motion as tolerated to left upper extremity.  Occupational therapy orders placed today for gentle range of motion, treatment, and evaluation.  She is to continue oral anti-inflammatories as needed for breakthrough pain.  She may resume light weight-bearing of the left upper extremity at this time.  She will follow up with our clinic in about 4 weeks for reevaluation of her left wrist without updated x-rays at that time.     Should the patient's symptoms worsen, persist, or fail to improve they should return for reevaluation and I would be happy to see them back anytime.    Disclaimer:  This note is prepared using voice recognition software and as such is likely to have errors and has not been proof read. Please contact me for questions.     Leatha Carranza  PA-C  Orthopedic/Hand Surgery

## 2025-06-26 ENCOUNTER — PATIENT MESSAGE (OUTPATIENT)
Dept: PSYCHIATRY | Facility: CLINIC | Age: 45
End: 2025-06-26
Payer: COMMERCIAL

## 2025-06-26 DIAGNOSIS — F90.0 ATTENTION DEFICIT HYPERACTIVITY DISORDER (ADHD), PREDOMINANTLY INATTENTIVE TYPE: Primary | ICD-10-CM

## 2025-06-26 RX ORDER — DEXTROAMPHETAMINE SACCHARATE, AMPHETAMINE ASPARTATE MONOHYDRATE, DEXTROAMPHETAMINE SULFATE AND AMPHETAMINE SULFATE 3.75; 3.75; 3.75; 3.75 MG/1; MG/1; MG/1; MG/1
15 CAPSULE, EXTENDED RELEASE ORAL 2 TIMES DAILY
Qty: 60 CAPSULE | Refills: 0 | Status: SHIPPED | OUTPATIENT
Start: 2025-06-26 | End: 2025-07-26

## 2025-06-30 ENCOUNTER — PATIENT MESSAGE (OUTPATIENT)
Dept: PSYCHIATRY | Facility: CLINIC | Age: 45
End: 2025-06-30
Payer: COMMERCIAL

## 2025-06-30 RX ORDER — DEXTROAMPHETAMINE SACCHARATE, AMPHETAMINE ASPARTATE, DEXTROAMPHETAMINE SULFATE AND AMPHETAMINE SULFATE 3.75; 3.75; 3.75; 3.75 MG/1; MG/1; MG/1; MG/1
15 TABLET ORAL 2 TIMES DAILY
Qty: 60 TABLET | Refills: 0 | Status: SHIPPED | OUTPATIENT
Start: 2025-06-30 | End: 2025-07-30

## 2025-07-07 ENCOUNTER — CLINICAL SUPPORT (OUTPATIENT)
Dept: REHABILITATION | Facility: HOSPITAL | Age: 45
End: 2025-07-07
Payer: COMMERCIAL

## 2025-07-07 DIAGNOSIS — M25.532 LEFT WRIST PAIN: ICD-10-CM

## 2025-07-07 DIAGNOSIS — S52.522D CLOSED TORUS FRACTURE OF DISTAL END OF LEFT RADIUS WITH ROUTINE HEALING, SUBSEQUENT ENCOUNTER: ICD-10-CM

## 2025-07-07 PROCEDURE — 97110 THERAPEUTIC EXERCISES: CPT

## 2025-07-07 PROCEDURE — 97165 OT EVAL LOW COMPLEX 30 MIN: CPT

## 2025-07-07 NOTE — PATIENT INSTRUCTIONS
"[facility], OCCUPATIONAL THERAPY  HOME EXERCISE PROGRAM       Complete the following exercises for 10 repetitions, 3-4 x/day:     + weightbearing progression: wall push ups > table top push ups> modified floor > traditional position         AROM: Wrist Flexion / Extension               Bend your wrist forward and back as far as possible.      AROM: Wrist Radial / Ulnar Deviation  Bend your wrist from side to side as far as possible.  AROM: Isolated MCP Flexion / Extension ("Wave")   Bend only your large, bottom knuckles. Hold 3 seconds.   Keep the tips of your fingers straight. Straighten fingers.  AROM: Isolated IPJ Flexion / Extension ("Hook")   Bend only your middle and end knuckles. Hold 3 seconds.   Straighten your fingers.   AROM: MCP and PIP Flexion / Extension ("Straight Fist")  Bend your bottom and middle knuckles, keeping the tips of your fingers straight.   Try to touch the pads of your fingers on your palm. Hold 3 seconds.   Straighten your fingers.   AROM: Composite Flexion / Extension ("Full Fist")  Bend every joint in your hand into a fist. Hold 3 seconds.   Straighten your fingers.     AROM: Composte Extension ("Finger Lifts")  Lift your finger off of the table one at a time. Hold 3 seconds.   Relax your finger.  AROM: Abduction / Adduction  With hand flat on table, spread all fingers apart,   then bring them together as close as possible.                    _                        LIN Herrera, NAYELI, CHT  Certified Hand Therapist  Occupational Therapist       "

## 2025-07-08 DIAGNOSIS — M25.532 LEFT WRIST PAIN: Primary | ICD-10-CM

## 2025-07-10 ENCOUNTER — PATIENT MESSAGE (OUTPATIENT)
Facility: CLINIC | Age: 45
End: 2025-07-10
Payer: COMMERCIAL

## 2025-07-11 NOTE — PROGRESS NOTES
Outpatient Rehab    Occupational Therapy Evaluation    Patient Name: Stormy Carlisle  MRN: [id number]  YOB: 1980  Encounter Date: 7/7/2025    Therapy Diagnosis:   Encounter Diagnoses   Name Primary?    Left wrist pain     Closed torus fracture of distal end of left radius with routine healing, subsequent encounter      Physician: Leatha Carranza PA-C    Physician Orders: Eval and Treat  Medical Diagnosis: Left wrist pain  Closed torus fracture of distal end of left radius with routine healing, subsequent encounter  Surgical Diagnosis: Not applicable for this Episode   Surgical Date: Not applicable for this Episode  Days Since Last Surgery: Not applicable for this Episode    Visit # / Visits Authorized: 1 / 1  Insurance Authorization Period: 6/17/2025 to 6/17/2026  Date of Evaluation: 7/7/2025  Plan of Care Certification: 7/7/2025 to 9/29/2025     Time In: 1130   Time Out: 1215  Total Time (in minutes): 45   Total Billable Time (in minutes): 45    Intake Outcome Measure for FOTO Survey    Therapist reviewed FOTO scores for Stormy Carlisle on 7/7/2025.   FOTO report - see Media section or FOTO account episode details.     Intake Score:  %    Precautions:       Subjective   History of Present Illness  Stormy is a 45 y.o. female who reports to occupational therapy with a chief concern of Limitations in wrist extension and weight bearing.     The patient reports a medical diagnosis of M25.532 (ICD-10-CM) - Left wrist pain  S52.522D (ICD-10-CM) - Closed torus fracture of distal end of left radius with routine healing, subsequent encounter.            Dominant Hand: Right  History of Present Condition/Illness: Pt present on this date s/p near 11 weeks from a closed nondisplaced L radius fx which was treated conservatively. She notes at this time she has minimal pain and limitations. She reports greatest challenges with weightbearing requiring her wrist to be at end range of wrist extension. She is active  in yoga and enjoys going to the gym and is here today to assess if she is ready to make a full return.     Activities of Daily Living  Previously independent with activities of daily living? Yes     Currently independent with activities of daily living? Yes          Previously independent with instrumental activities of daily living? Yes     Currently independent with instrumental activities of daily living? Yes              Pain     Patient reports a current pain level of 0/10. Pain at best is reported as 0/10. Pain at worst is reported as 3/10.   Pain Qualities: Aching, Discomfort         Employment  Patient reports: Does the patient's condition impact their ability to work?      Pt is an artist.       Past Medical History/Physical Systems Review:   Stormy Carlisle  has a past medical history of Abnormal Pap smear, ADHD (attention deficit hyperactivity disorder), Anxiety, and Depression.    Stormy Carlisle  has a past surgical history that includes Breast augmentation; Induced  (2001); and Colonoscopy (N/A, 2024).    Stormy has a current medication list which includes the following prescription(s): dextroamphetamine-amphetamine, fluoxetine, and progesterone.    Review of patient's allergies indicates:   Allergen Reactions    Neomycin-bacitracnzn-polymyxnb Rash    Neomycin-bacitracin-polymyxin Rash        Objective      Wrist Range of Motion  Right Wrist   Active (deg) Passive (deg) Pain Comment   Flexion 75         Extension 73         Radial Deviation 10         Ulnar Deviation 35           Left Wrist   Active (deg) Passive (deg) Pain Comment   Flexion 70         Extension 60         Radial Deviation 10         Ulnar Deviation 26                            Right  Strength  Right Hand Dynamometer Position: 2  Elbow Position Forearm Position Trial 1 (lbs) Trial 2  (lbs) Trial 3  (lbs) Average  (lbs) Pain   Flexed Neutral 55               Left  Strength  Left Hand Dynamometer Position:  2  Elbow Position Forearm Position Trial 1 (lbs) Trial 2 (lbs) Trial 3 (lbs) Average (lbs) Pain   Flexed Neutral 41               Right Pinch Strength   Trial 1 (lbs) Trial 2 (lbs) Trial 3 (lbs) Average (lbs) Pain   Lateral (Key Pinch) 15           Three Point (Three Jaw Sergey) 12           Two Point (Tip to Tip) 10               Left Pinch Strength   Trial 1 (lbs) Trial 2 (lbs) Trial 3 (lbs) Average (lbs) Pain   Lateral (Key Pinch) 20           Three Point (Three Jaw Sergey) 13           Two Point (Tip to Tip) 12                         Treatment:       Time Entry(in minutes):       Assessment & Plan   Assessment  Stormy presents with a condition of Low complexity.   Presentation of Symptoms: Stable       ADL Limitations : Bathing/showering, Personal hygiene and grooming, Dressing, Toileting and toilet hygiene  Rest and Sleep Limitations: Rest, Disrupted sleep pattern  Work Limitations: Job performance  Leisure Limitations: Leisure participation  Functional Limitations: Activity tolerance, Carrying objects, Completing work/school activities, Fine motor coordination, Functional mobility, Manipulating objects, Pain with ADLs/IADLs, Range of motion, Proprioception, Other (Comment)                 Evaluation/Treatment Response: Patient limited by pain  Prognosis: Good  Assessment Details: Pt presents today 11 week s/p L radius fx treated conservatively. She demo's minimal deficits with only complaints of discomfort with weightbearing in end ranges of wrist extension. Therapist reviewed a progression program with patient to ease into the aggressive position in a traditional plank position. Pt educated on proprioception and the time the body needs to adjust and re educate to it prior level. Pt demo's and verbalized understanding of strenghtening HEP and stretches for the LUE. Pt may return as needed if necessary.     Plan  From an occupational therapy perspective, the patient would benefit from: Skilled Rehab  Services    Planned therapy interventions include: Therapeutic exercise, Therapeutic activities, Neuromuscular re-education, Manual therapy, ADLs/IADLs, Orthotic management and training, and Other (Comment).    Planned modalities to include: Biofeedback, Cryotherapy (cold pack), Electrical stimulation - passive/unattended, Fluidotherapy, Electrical stimulation - attended, Paraffin bath, Thermotherapy (hot pack), Ultrasound, and Other (Comment).        Visit Frequency: 2 times Per Week for 12 Weeks.       This plan was discussed with Patient.   Discussion participants: Agreed Upon Plan of Care             The patient's spiritual, cultural, and educational needs were considered, and the patient is agreeable to the plan of care and goals.           Goals:   Active       LTGs       1) Patient will be independent in HEP        Start:  07/10/25    Expected End:  08/21/25                Georgia Enrique OT

## 2025-07-14 ENCOUNTER — HOSPITAL ENCOUNTER (OUTPATIENT)
Dept: RADIOLOGY | Facility: OTHER | Age: 45
Discharge: HOME | End: 2025-07-14
Payer: COMMERCIAL

## 2025-07-14 ENCOUNTER — OFFICE VISIT (OUTPATIENT)
Dept: ORTHOPEDICS | Facility: CLINIC | Age: 45
End: 2025-07-14
Payer: COMMERCIAL

## 2025-07-14 VITALS — HEIGHT: 62 IN | WEIGHT: 136 LBS | BODY MASS INDEX: 25.03 KG/M2

## 2025-07-14 DIAGNOSIS — M25.532 LEFT WRIST PAIN: ICD-10-CM

## 2025-07-14 DIAGNOSIS — S52.522D CLOSED TORUS FRACTURE OF DISTAL END OF LEFT RADIUS WITH ROUTINE HEALING, SUBSEQUENT ENCOUNTER: Primary | ICD-10-CM

## 2025-07-14 PROCEDURE — 73110 X-RAY EXAM OF WRIST: CPT | Mod: 26,LT,, | Performed by: RADIOLOGY

## 2025-07-14 PROCEDURE — 1160F RVW MEDS BY RX/DR IN RCRD: CPT | Mod: CPTII,S$GLB,,

## 2025-07-14 PROCEDURE — 73110 X-RAY EXAM OF WRIST: CPT | Mod: TC,FY,LT

## 2025-07-14 PROCEDURE — 1159F MED LIST DOCD IN RCRD: CPT | Mod: CPTII,S$GLB,,

## 2025-07-14 PROCEDURE — 99214 OFFICE O/P EST MOD 30 MIN: CPT | Mod: S$GLB,,,

## 2025-07-14 PROCEDURE — 99999 PR PBB SHADOW E&M-EST. PATIENT-LVL III: CPT | Mod: PBBFAC,,,

## 2025-07-14 PROCEDURE — 3008F BODY MASS INDEX DOCD: CPT | Mod: CPTII,S$GLB,,

## 2025-07-14 NOTE — PROGRESS NOTES
[facility]  History & Physical  Orthopedics    SUBJECTIVE:    Dr. Guerra is a supervising physician for this patient/encounter    History of Present Illness    CHIEF COMPLAINT:  - Left wrist injury    HPI:  Stormy presents with left wrist pain following a roller skating accident two days ago. Initially thought to be a sprain, the pain felt different this time. Pain is most severe when moving the thumb and also present in the back of the wrist. She rates the pain as 7/10. She can move fingers without significant pain but has considerable discomfort with thumb movement.    She visited urgent care the night before, where they applied a large splint and referred for further evaluation. XRs taken at urgent care revealed a fracture in the radius bone, with visible crack lines in multiple views. She expresses concern about potentially needing a splint up to the elbow and reports difficulty sleeping with the current splint.    Interval history April 22, 2025: The patient returns today for re-evaluation.  She is now about 1 week status post a nondisplaced left distal radius fracture which is being treated closed.  She went to the ED last night because the cast was feeling too tight and it was causing some numbness in her hand.  It was removed and her numbness resolved quickly and she was placed into a plaster splint.  At this point in time she denies numbness or tingling and is feeling well with pain rated 3/10.  She returns for re-evaluation today with no other complaints.    Interval history May 6, 2025: The patient returns today for re-evaluation.  She is now approximately 2 weeks out from a nondisplaced left distal radius fracture which has been treated closed.  She has remained immobilized in a short-arm fiberglass cast.  Overall, she reports her numbness and tingling that she was experiencing approximately 1 week ago has significantly resolved since placement of a new cast.  She has been utilizing  ibuprofen for breakthrough pain, and she reports her pain as a 3/10 today.  She does note she is still experiencing some soreness on the dorsum of her left hand and with active range of motion of the left thumb.  She returns today for re-evaluation of her left wrist with no further complaints.    Interval history May 8, 2025: The patient returns today for reevaluation. She is now approximately 2 weeks and 4 days out from initial injury. She was evaluated earlier this week, and she was immobilized in a left short-arm fiberglass plaster cast. She presents prematurely today for her follow up as she noticed increased discoloration to left hand last night, and she also noticed significant tightness to the dorsal proximal forearm where the short-arm fiberglass cast was applied.  She reports her pain is a 2/10 today accompanied by mild bruising noted to the distal forearm of the left volar wrist.  She denies new numbness and new tingling. She is utilizing ibuprofen 400 mg for break through pain.  She presents today for re-evaluation of her left wrist and for cast re-application with no further complaints.    Interval history May 27, 2025: The patient returns today for re-evaluation of her left wrist.  She is now approximately 5 weeks and 2 days out from her left distal radius fracture. She has remained immobilized in a left short arm titan exos since her last clinic visit.  Overall, the patient reports she is doing extremely well, and she reports minimal pain.  She reports a 1/10 pain accompanied by slight soreness to the left wrist.  She denies use of pain medications, new numbness, and new tingling.  She presents today for re-evaluation of her left wrist with no further complaints.    Interval history June 17, 2025: The patient returns today for re-evaluation of her left wrist.  She is now approximately 8 weeks and 2 days out from her initial left distal radius fracture.  She has remained immobilized in a left short-arm  Titan wrist brace since her last clinic visit.  She reports a 2/10 pain today with the exception of certain movements such as opening the door and gripping objects. She presents today for re-evaluation of her left wrist with no further complaints    Interval history 2025:  The patient returns today for re-evaluation of her left wrist.  She is now approximately 12 weeks out from initial injury.  Overall, she reports a 0/10 pain.  However, she does note stiffness to the left wrist with active extension and flexion.  She denies new numbness, tingling, and use of oral anti-inflammatories for breakthrough pain.  She notes she has attended 1 session of occupational therapy which assisted in ROM and strengthening to the left hand.  She presents today for re-evaluation of her left wrist pain with no further complaints.    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    ROS:  Constitutional: +sleep disturbances, +sleep difficulty  Musculoskeletal: +joint pain, +limb pain, +pain with movement     Past Medical History:   Diagnosis Date    Abnormal Pap smear 2002    Colposcopy    ADHD (attention deficit hyperactivity disorder)     Anxiety     Depression      Past Surgical History:   Procedure Laterality Date    Breast augmentation      COLONOSCOPY N/A 2024    Procedure: COLONOSCOPY;  Surgeon: Andre Jones MD;  Location: [facility] ENDOSCOPY;  Service: Endoscopy;  Laterality: N/A;  ref by / maida inst portal-RB   precall complete-st    INDUCED   2001     Review of patient's allergies indicates:   Allergen Reactions    Neomycin-bacitracnzn-polymyxnb Rash    Neomycin-bacitracin-polymyxin Rash     Social History     Social History Narrative     Born and raised in [place].    Moved to [facility] in 2005.    Graduate school at [employer].     for 6 years.    2 children; recently post partum 1/15/15     is an     Works as an artist.     Family History   Problem Relation Name Age of Onset    Colon cancer Paternal Grandmother  62    Valvular heart disease Father          s/p replacement    No Known Problems Mother      Breast cancer Paternal Aunt  58    Ovarian cancer Neg Hx      Diabetes Neg Hx      Hypertension Neg Hx      Stroke Neg Hx         [Current Medications]    [Current Medications]    Current Outpatient Medications:     dextroamphetamine-amphetamine (ADDERALL XR) 15 MG 24 hr capsule, Take by mouth every morning., Disp: , Rfl:     dextroamphetamine-amphetamine (ADDERALL) 15 mg tablet, Take 1 tablet (15 mg total) by mouth 2 (two) times a day., Disp: 30 tablet, Rfl: 0    dextroamphetamine-amphetamine (ADDERALL) 15 mg tablet, Take 1 tablet by mouth 2 (two) times daily., Disp: , Rfl:     FLUoxetine 20 MG capsule, Take 1 capsule (20 mg total) by mouth once daily., Disp: 90 capsule, Rfl: 1    norgestimate-ethinyl estradioL (TRI-LO-TERRELL) 0.18/0.215/0.25 mg-25 mcg tablet, Take 1 tablet by mouth once daily., Disp: , Rfl:     ondansetron (ZOFRAN-ODT) 4 MG TbDL, , Disp: , Rfl:     progesterone (PROMETRIUM) 100 MG capsule, Take 1 capsule (100 mg total) by mouth nightly., Disp: 30 capsule, Rfl: 6    clobetasol 0.05% (TEMOVATE) 0.05 % Oint, Apply topically 2 (two) times daily., Disp: 45 g, Rfl: 0    OBJECTIVE:      Vital Signs (Most Recent):  There were no vitals filed for this visit.  There is no height or weight on file to calculate BMI.    Physical Exam    Musculoskeletal: Able to make a fist.         Left Hand/Wrist Examination:    Observation/Inspection:  Swelling  none   Deformity  none  Discoloration  none  Scars   none    Atrophy  None    Patient nontender to palpation about the left distal radius and radiocarpal joint  Patient nontender to  palpation about the left radial styloid  Patient without tenderness to palpate the DRUJ    Neurovascular Exam:  Digits WWP, brisk CR < 3s throughout  NVI motor/LTS to M/R/U nerves, radial pulse 2+    ROM hand full, painless; she is able to make a full composite fist with the left hand without pain    ROM wrist mildly limited and painless with wrist extension and wrist flexion due to stiffness    ROM elbow full, painless    Abdomen not guarded  Respirations nonlabored  Perfusion intact    Diagnostic Results:     Imaging - I independently viewed the patient's imaging as well as the radiology report.  Xrays of the patient's left wrist re demonstrate a nondisplaced distal radius fracture with signs of interval healing.  No additional fractures or dislocations noted.    FINDINGS:  Reconfirmed subacute healing-healed nondisplaced distal radial fracture.  No new fractures.  Preserved joint spaces.  Reconfirmed soft tissue swelling dorsally about the wrist as noted on the lateral exam.    EMG - none    ASSESSMENT/PLAN:      45 y.o. yo female with nondisplaced left distal radius fracture x 8 weeks out from initial injury     Plan: The patient and I had a thorough discussion today.  We discussed the working diagnosis as well as several other potential alternative diagnoses.  Treatment options were discussed, both conservative and surgical.  Conservative treatment options would include things such as activity modifications, workplace modifications, a period of rest, oral vs topical OTC and prescription anti-inflammatory medications, occupational therapy, splinting/bracing, immobilization, corticosteroid injections, and others.  Surgical options were discussed as well.     At this point in time, the patient is now approximately 12 weeks out from a nondisplaced left distal radius fracture that was treated nonoperatively.  She has completed occupational therapy with significant improvement in her range of motion.  She is to  continue range-of-motion and strengthening as tolerated to left wrist.  She will follow up with our clinic as needed or sooner for any problems.    Should the patient's symptoms worsen, persist, or fail to improve they should return for reevaluation and I would be happy to see them back anytime.    Disclaimer:  This note is prepared using voice recognition software and as such is likely to have errors and has not been proof read. Please contact me for questions.     Leatha Carranza PA-C  Orthopedic/Hand Surgery

## 2025-07-19 ENCOUNTER — OFFICE VISIT (OUTPATIENT)
Dept: URGENT CARE | Facility: CLINIC | Age: 45
End: 2025-07-19
Payer: COMMERCIAL

## 2025-07-19 VITALS
BODY MASS INDEX: 25.03 KG/M2 | OXYGEN SATURATION: 99 % | RESPIRATION RATE: 19 BRPM | TEMPERATURE: 98 F | HEIGHT: 62 IN | SYSTOLIC BLOOD PRESSURE: 101 MMHG | WEIGHT: 136 LBS | HEART RATE: 91 BPM | DIASTOLIC BLOOD PRESSURE: 68 MMHG

## 2025-07-19 DIAGNOSIS — H91.93 DECREASED HEARING OF BOTH EARS: ICD-10-CM

## 2025-07-19 DIAGNOSIS — H92.02 OTALGIA, LEFT EAR: Primary | ICD-10-CM

## 2025-07-19 DIAGNOSIS — H61.23 BILATERAL IMPACTED CERUMEN: ICD-10-CM

## 2025-07-19 PROCEDURE — 99213 OFFICE O/P EST LOW 20 MIN: CPT | Mod: 25,S$GLB,, | Performed by: NURSE PRACTITIONER

## 2025-07-19 PROCEDURE — 69209 REMOVE IMPACTED EAR WAX UNI: CPT | Mod: 50,S$GLB,, | Performed by: NURSE PRACTITIONER

## 2025-07-19 NOTE — PROCEDURES
"Ear Cerumen Removal    Date/Time: 7/19/2025 3:00 PM    Performed by: Stephenie Shah NP  Authorized by: Stephenie Shah NP    Time out: Immediately prior to procedure a "time out" was called to verify the correct patient, procedure, equipment, support staff and site/side marked as required.    Consent Done?:  Yes (Verbal)    Local anesthetic:  None  Ceruminolytics applied: Ceruminolytics applied prior to the procedure    Ceruminolytic: pericolace.  Location details:  Both ears  Procedure type: irrigation    Cerumen  Removal Results:  Cerumen completely removed  Patient tolerance:  Patient tolerated the procedure well with no immediate complications    "

## 2025-07-19 NOTE — PROGRESS NOTES
"Subjective:      Patient ID: Stormy Carlisle is a 45 y.o. female.    Vitals:  height is 5' 2" (1.575 m) and weight is 61.7 kg (136 lb). Her oral temperature is 97.9 °F (36.6 °C). Her blood pressure is 101/68 and her pulse is 91. Her respiration is 19 and oxygen saturation is 99%.     Chief Complaint: Ear Fullness    Pt is a 45 y.o. female with the complaint of left ear fullness   Symptoms include muffled hearing   Symptoms began this morning   Pt tried using hydrogen peroxide    45 year old female presents to clinic with complaints of muffled sounds, left ear pain treating with hydrogen peroxide flush in ear without resolve. Reports history of ear wax build-up with multiple washings.     Ear Fullness   There is pain in the left ear. This is a new problem. The current episode started today. The problem has been gradually worsening. There has been no fever. Associated symptoms include hearing loss. Pertinent negatives include no abdominal pain, coughing, diarrhea, ear discharge, headaches, neck pain, rash, rhinorrhea, sore throat or vomiting. She has tried nothing for the symptoms. There is no history of a chronic ear infection, hearing loss or a tympanostomy tube.       Constitution: Negative for chills and fever.   HENT:  Positive for ear pain and hearing loss. Negative for ear discharge, foreign body in ear, tinnitus and sore throat.    Neck: Negative for neck pain.   Respiratory:  Negative for cough.    Gastrointestinal:  Negative for abdominal pain, vomiting and diarrhea.   Skin:  Negative for rash and erythema.   Neurological:  Negative for headaches.      Objective:     Physical Exam   Constitutional: She is oriented to person, place, and time. She appears well-developed.   HENT:   Head: Normocephalic and atraumatic. Head is without abrasion, without contusion and without laceration.   Ears:   Right Ear: External ear normal. impacted cerumen  Left Ear: External ear normal. impacted cerumen  Nose: Nose normal. "   Mouth/Throat: Oropharynx is clear and moist and mucous membranes are normal.   Eyes: EOM and lids are normal. Extraocular movement intact   Neck: Trachea normal and phonation normal. Neck supple.   Cardiovascular: Normal rate.   Pulmonary/Chest: Effort normal. No stridor. No respiratory distress.   Musculoskeletal: Normal range of motion.         General: Normal range of motion.   Neurological: She is alert and oriented to person, place, and time.   Skin: Skin is warm, dry, intact and no rash. Capillary refill takes less than 2 seconds. No abrasion, No burn, No bruising, No erythema and No ecchymosis   Psychiatric: Her speech is normal and behavior is normal. Judgment and thought content normal.   Nursing note and vitals reviewed.      Assessment:     1. Otalgia, left ear    2. Bilateral impacted cerumen    3. Decreased hearing of both ears        Plan:       Otalgia, left ear    Bilateral impacted cerumen  -     Ear Cerumen Removal    Decreased hearing of both ears      verbal consent and ear wax removal procedure and expectations/risks with patient discussed. Informed complications using Cerumenolytics can lead to allergic reactions, otitis externa, earache, transient hearing loss, and dizziness. A common adverse effect of irrigation is retention of water behind incompletely removed cerumen, resulting in maceration of the skin and potential infection, tympanic membrane perforation, hearing loss, tinnitus, pain, and vertigo can also occur, particularly after aggressive irrigation for cerumen accumulation. The most common adverse effects with manual removal of cerumen include ear pain, bleeding, laceration, and perforation of the tympanic membrane. Colace was placed in the ear and allowed to soak for 15 minutes, The cerumen was removed with warm water irrigation by ORLIN Banda. There was no evidence of TM perforation upon reevaluation of the ear after cerumen removal . Patient tolerated the procedure well without  "any complications         Ear Cerumen Removal    Date/Time: 7/19/2025 3:00 PM    Performed by: Stephenie Shah NP  Authorized by: Stephenie Shah NP    Time out: Immediately prior to procedure a "time out" was called to verify the correct patient, procedure, equipment, support staff and site/side marked as required.    Consent Done?:  Yes (Verbal)    Local anesthetic:  None  Ceruminolytics applied: Ceruminolytics applied prior to the procedure    Ceruminolytic: pericolace.  Location details:  Both ears  Procedure type: irrigation    Cerumen  Removal Results:  Cerumen completely removed  Patient tolerance:  Patient tolerated the procedure well with no immediate complications           Patient Instructions   Do not use cotton tipped swabs to clean inside your ears. Sticking anything into the ears can push the ear wax in deeper and cause impactions.  You can dip a cotton ball in mineral oil and place it in your outer ear for 10 to 20 minutes once a week. This will help keep your ear wax soft. It may help prevent the ear wax from building up again. Do not push the cotton into the ear canal.  You may want to take medicine like ibuprofen, naproxen, or acetaminophen to help with pain.  Please return here or go to the Emergency Department for any concerns or worsening of condition.  Please follow up with your primary care doctor or specialist as needed.    If you  smoke, please stop smoking.      "

## 2025-08-04 ENCOUNTER — PATIENT MESSAGE (OUTPATIENT)
Dept: PSYCHIATRY | Facility: CLINIC | Age: 45
End: 2025-08-04
Payer: COMMERCIAL

## 2025-08-05 ENCOUNTER — OFFICE VISIT (OUTPATIENT)
Dept: PSYCHIATRY | Facility: CLINIC | Age: 45
End: 2025-08-05
Payer: COMMERCIAL

## 2025-08-05 DIAGNOSIS — F41.1 GAD (GENERALIZED ANXIETY DISORDER): ICD-10-CM

## 2025-08-05 DIAGNOSIS — F90.0 ATTENTION DEFICIT HYPERACTIVITY DISORDER (ADHD), PREDOMINANTLY INATTENTIVE TYPE: Primary | Chronic | ICD-10-CM

## 2025-08-05 DIAGNOSIS — F33.42 RECURRENT MAJOR DEPRESSIVE DISORDER, IN FULL REMISSION: ICD-10-CM

## 2025-08-05 PROCEDURE — 98006 SYNCH AUDIO-VIDEO EST MOD 30: CPT | Mod: 95,,, | Performed by: NURSE PRACTITIONER

## 2025-08-05 PROCEDURE — G2211 COMPLEX E/M VISIT ADD ON: HCPCS | Mod: 95,,, | Performed by: NURSE PRACTITIONER

## 2025-08-05 RX ORDER — DEXTROAMPHETAMINE SACCHARATE, AMPHETAMINE ASPARTATE, DEXTROAMPHETAMINE SULFATE AND AMPHETAMINE SULFATE 3.75; 3.75; 3.75; 3.75 MG/1; MG/1; MG/1; MG/1
15 TABLET ORAL 2 TIMES DAILY
Qty: 60 TABLET | Refills: 0 | Status: SHIPPED | OUTPATIENT
Start: 2025-08-05 | End: 2025-09-04

## 2025-08-05 RX ORDER — DEXTROAMPHETAMINE SACCHARATE, AMPHETAMINE ASPARTATE, DEXTROAMPHETAMINE SULFATE AND AMPHETAMINE SULFATE 3.75; 3.75; 3.75; 3.75 MG/1; MG/1; MG/1; MG/1
15 TABLET ORAL 2 TIMES DAILY
Qty: 60 TABLET | Refills: 0 | Status: SHIPPED | OUTPATIENT
Start: 2025-09-05 | End: 2025-10-05

## 2025-08-05 RX ORDER — DEXTROAMPHETAMINE SACCHARATE, AMPHETAMINE ASPARTATE, DEXTROAMPHETAMINE SULFATE AND AMPHETAMINE SULFATE 3.75; 3.75; 3.75; 3.75 MG/1; MG/1; MG/1; MG/1
15 TABLET ORAL 2 TIMES DAILY
Qty: 60 TABLET | Refills: 0 | Status: SHIPPED | OUTPATIENT
Start: 2025-10-05 | End: 2025-11-04

## 2025-08-05 NOTE — PROGRESS NOTES
Outpatient Psychiatry Follow-Up Visit (MD/NP)    8/5/2025      The patient location is:  [place]    The chief complaint leading to consultation is: inattention, depression and anxiety    Visit type: audio only    Time with patient:  27 minutes  37 minutes of total time spent on the encounter, which includes face to face time and non-face to face time preparing to see the patient (eg, review of tests), Obtaining and/or reviewing separately obtained history, Documenting clinical information in the electronic or other health record, Independently interpreting results (not separately reported) and communicating results to the patient/family/caregiver, or Care coordination (not separately reported).         Each patient to whom he or she provides medical services by telemedicine is:  (1) informed of the relationship between the physician and patient and the respective role of any other health care provider with respect to management of the patient; and (2) notified that he or she may decline to receive medical services by telemedicine and may withdraw from such care at any time.    Notes:         Clinical Status of Patient:  Outpatient (Ambulatory)    Chief Complaint:  Stormy Carlisle is a 45 y.o. female who presents today for follow-up of depression, anxiety and attention problems. She is taking  20 mg Prozac & adderall 15 mg BID for ADHD.    Current Psychiatric Medications:  Prozac 20 mg daily for mood  Adderall 15 mg PO BID  Xanax 0.25 mg po RPN (rarely takes for severe anxiety)    Interval History and Content of Current Session:    Patient presented for follow up today. She stated is doing well. She stated her daughters had a good a summer and will be starting school soon.  She stated her wrist has healed well. She stated she visited with her parents this Summer and it went well.    She shared her feelings about her relationship with her mother and her effective coping skills. She stated she continues to work on  "her art and still enjoys it. She continues to report well managed anxiety with prozac 20 mg po daily and her effective coping strategies. She denied feeling depressed and when she feels stressed she continues to utilize healthy coping skills like swimming, gardening, art, and spending time with her kids. She described her mood as "good" and her affect was appropriate.    She continues to report adderall 15 mg po BID is still very helpful in improving her focus, attention, concentration, and productivity. She continues to report good sleep and appetite. She continues to report managed depression and anxiety with Prozac 20 mg po daily and coping strategies. She denied SE and AE including jitteriness, CP, or palpitations.     She denied SI/HI/AVH. She denied suicidal ideation and no suicide plan or intent. She denied previous suicide attempts. She denied access to guns. She denied hopelessness, amotvation, or anhedonia. No objective signs or symptoms of jonny or psychosis. Patient VS are within normal range and no history of hypertension. She denied alcohol or drug abuse.     She denied ant side effects including but not limited to palpitations, SOB, chest pain, nausea, vomiting, irritability, anxiety, insomnia or GI upset.      Past med trials: Wellbutrin (helped mood/motivation, but caused her to pass out, have decrease appetite, weight loss), Paxil was very hard to come off of and does not remember how much it helped. Prozac worked but was insufficiently helpful, venlafaxine (brain zaps and interfered with sleep) and vistaril (too groggy, sedating and made her feel tired that caused her to feel anxious and depressed)    Psychiatric Review Of Systems - Is patient experiencing or having changes in:  sleep: no  appetite: no  weight: no  energy/anergy: no  interest/pleasure/anhedonia: no  somatic symptoms: no  anxiety/panic: yes but manageable  guilty/hopelessness: no  concentration: yes Improved with adderall 15 mg po " "BID  S.I.B.s/risky behavior: no  Irritability: no  Racing thoughts: no  Impulsive behaviors: no  Paranoia:no  AVH:no  Suicidal thoughts/plan/intent: no  SE: no  ETOH: no  Drugs: no    Review of Systems     MEDICAL REVIEW OF SYSTEMS  History obtained from the patient  General : NO chills or fever  Eyes: NO  visual changes  ENT: NO  nasal discharge or sore throat  Endocrine: NO weight changes  Dermatological: NO rashes  Respiratory: NO cough, shortness of breath  Cardiovascular: NO chest pain, palpitations or racing heart  Gastrointestinal: NO nausea, vomiting, constipation or diarrhea  Musculoskeletal: NO muscle pain or stiffness  Neurological: NO , dizziness, headaches or tremors  Psychiatric: please see HPI        Past Medical, Family and Social History: The patient's past medical, family and social history have been reviewed and updated as appropriate within the electronic medical record - see encounter notes.  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 Has been through CBT but not recently. Med trials as above plus ambien per chart in 2015    Compliance: yes    Side effects: None    Risk Parameters:  Patient reports no suicidal ideation  Patient reports no homicidal ideation  Patient reports no self-injurious behavior  Patient reports no violent behavior    Exam (detailed: at least 9 elements; comprehensive: all 15 elements)   Constitutional  Vitals:  Most recent vital signs, dated greater than 90 days prior to this appointment, were reviewed.   There were no vitals filed for this visit.       General:  unremarkable, age appropriate     Musculoskeletal  Muscle Strength/Tone:  no dystonia, no tremor, no tic   Gait & Station:  Normal gait     Psychiatric  Speech:  no latency; no press, spontaneous,    Mood & Affect:  "Good"  Mood congruent and appropriate and with full range   Thought Process:  normal and logical, logical   Associations:  " intact   Thought Content:  normal, no suicidality, no homicidality, delusions, or paranoia   Insight:  intact, has awareness of illness   Judgement: behavior is adequate to circumstances, age appropriate   Orientation:  grossly intact   Memory: intact for content of interview, able to remember recent events- yes, able to remember remote events- yes   Language: grossly intact, able to name, able to repeat   Attention Span & Concentration:  able to focus with adderall   Fund of Knowledge:  intact and appropriate to age and level of education     Assessment and Diagnosis   Status/Progress: Based on the examination today, the patient's problem(s) is/are not well controlled.  New problems have not been presented today.   Co-morbidities, Diagnostic uncertainty and Lack of compliance are not complicating management of the primary condition.  There are no active rule-out diagnoses for this patient at this time.     General Impression: Doing well on her current medication regimen of adderall 15 mg po BID and prozac 20 mg po daily. No changes in dosages due to their effectiveness in treating her ADHD, depression, and anxiety. Tolerating her medications well and no side or adverse effects. She is optimistic about her future and no thoughts of harming herself or others.       ICD-10-CM ICD-9-CM   1. Attention deficit hyperactivity disorder (ADHD), predominantly inattentive type  F90.0 314.00   2. JAMEY (generalized anxiety disorder)  F41.1 300.02   3. Recurrent major depressive disorder, in full remission  F33.42 296.36           Intervention/Counseling/Treatment Plan   In person visit 10/30/2024  Depression & Anxiety  Continue prozac 20 mg to help with depressive sx  Continue xanax 0.25 mg po PRN daily for severe anxiety and panic attacks (rarely takes it)  Informed pt of the risks of continuous Benzodiazepine use including tolerance, dependence, dizziness, drowsiness, memory problems, and withdrawals that may be life  threatening upon abrupt cessation. Also advised not to take Benzodiazepines with Opiates or other sedatives and also not to drive or operate heavy machinery while using Benzodiazepines. Avoid alcohol with Benzodiazapine. Patient agreed to take it and verbalized understanding  -We discussed risk of Serotonin Syndrome including symptoms in order of appearance: diarrhea, restlessness, extreme agitation, autonomic instability, hyperthermia, rigidity, coma, and death.  -Discussed black box warning of increased Suicidal thoughts in individuals younger than 24 years old and the steps to take if patient ever experiences suicide ideation (contact family, suicide hotline 988, call 911 or go the nearest emergency room       Reviewed patient's most recent vitals     ADHD, inattentive type  Continue Adderall IR 15 mg PO BID. Checked  and no signs of misuse.  Reviewed patient's vitals  Encouraged in-person visit      -Continue psychotherapy     Previously discussed risks, benefits, AE's (incl serotonin syndrome) SE's (including but not limited to GI sx, insomnia, agitation/anxiety/activation, appetite changes, HA, hypotension, others) inherent unpredictability of illness and treatment option    Discussed informed consent, diagnosis, risks and benefits of proposed treatment above vs alternative treatments vs no treatment, and potential side effects of these treatments. The patient expresses understanding of the above and displays the capacity to agree with this treatment given said understanding. Patient also agrees that, currently, the benefits outweigh the risks and would like to pursue treatment at this time. Answered all questions and discussed follow up. Encouraged patient to contact us with any questions or concerns.    Encouraged Patient to keep future appointment    Take medications as prescribed     Patient to present to Emergency Department for thoughts to harm herself or others      Return to Clinic: 3 months or  sooner if needed    Gina Murphy, PMHNP-BC

## 2025-08-21 ENCOUNTER — OFFICE VISIT (OUTPATIENT)
Dept: URGENT CARE | Facility: CLINIC | Age: 45
End: 2025-08-21
Payer: COMMERCIAL

## 2025-08-21 VITALS
DIASTOLIC BLOOD PRESSURE: 58 MMHG | OXYGEN SATURATION: 97 % | HEIGHT: 62 IN | HEART RATE: 90 BPM | SYSTOLIC BLOOD PRESSURE: 82 MMHG | BODY MASS INDEX: 24.38 KG/M2 | RESPIRATION RATE: 18 BRPM | TEMPERATURE: 98 F | WEIGHT: 132.5 LBS

## 2025-08-21 DIAGNOSIS — R05.9 COUGH, UNSPECIFIED TYPE: ICD-10-CM

## 2025-08-21 DIAGNOSIS — J01.90 ACUTE BACTERIAL SINUSITIS: Primary | ICD-10-CM

## 2025-08-21 DIAGNOSIS — J02.9 SORE THROAT: ICD-10-CM

## 2025-08-21 DIAGNOSIS — B96.89 ACUTE BACTERIAL SINUSITIS: Primary | ICD-10-CM

## 2025-08-21 LAB
CTP QC/QA: YES
CTP QC/QA: YES
MOLECULAR STREP A: NEGATIVE
SARS-COV+SARS-COV-2 AG RESP QL IA.RAPID: NEGATIVE

## 2025-08-21 PROCEDURE — 99214 OFFICE O/P EST MOD 30 MIN: CPT | Mod: S$GLB,,, | Performed by: NURSE PRACTITIONER

## 2025-08-21 PROCEDURE — 87811 SARS-COV-2 COVID19 W/OPTIC: CPT | Mod: QW,S$GLB,, | Performed by: NURSE PRACTITIONER

## 2025-08-21 PROCEDURE — 87651 STREP A DNA AMP PROBE: CPT | Mod: QW,S$GLB,, | Performed by: NURSE PRACTITIONER

## 2025-08-21 RX ORDER — AMOXICILLIN AND CLAVULANATE POTASSIUM 875; 125 MG/1; MG/1
1 TABLET, FILM COATED ORAL 2 TIMES DAILY
Qty: 14 TABLET | Refills: 0 | Status: SHIPPED | OUTPATIENT
Start: 2025-08-21 | End: 2025-08-28